# Patient Record
Sex: FEMALE | Race: WHITE | Employment: OTHER | URBAN - METROPOLITAN AREA
[De-identification: names, ages, dates, MRNs, and addresses within clinical notes are randomized per-mention and may not be internally consistent; named-entity substitution may affect disease eponyms.]

---

## 2017-01-09 ENCOUNTER — INITIAL CONSULT (OUTPATIENT)
Dept: PSYCHOLOGY | Age: 82
End: 2017-01-09

## 2017-01-09 DIAGNOSIS — F43.21 COMPLICATED BEREAVEMENT: ICD-10-CM

## 2017-01-09 DIAGNOSIS — F32.A DEPRESSIVE DISORDER: Primary | ICD-10-CM

## 2017-01-09 PROCEDURE — 90832 PSYTX W PT 30 MINUTES: CPT | Performed by: PSYCHOLOGIST

## 2017-01-09 ASSESSMENT — PATIENT HEALTH QUESTIONNAIRE - PHQ9
SUM OF ALL RESPONSES TO PHQ9 QUESTIONS 1 & 2: 2
1. LITTLE INTEREST OR PLEASURE IN DOING THINGS: 1
9. THOUGHTS THAT YOU WOULD BE BETTER OFF DEAD, OR OF HURTING YOURSELF: 0
2. FEELING DOWN, DEPRESSED OR HOPELESS: 1
SUM OF ALL RESPONSES TO PHQ QUESTIONS 1-9: 4
5. POOR APPETITE OR OVEREATING: 0
4. FEELING TIRED OR HAVING LITTLE ENERGY: 1
3. TROUBLE FALLING OR STAYING ASLEEP: 0
6. FEELING BAD ABOUT YOURSELF - OR THAT YOU ARE A FAILURE OR HAVE LET YOURSELF OR YOUR FAMILY DOWN: 1
10. IF YOU CHECKED OFF ANY PROBLEMS, HOW DIFFICULT HAVE THESE PROBLEMS MADE IT FOR YOU TO DO YOUR WORK, TAKE CARE OF THINGS AT HOME, OR GET ALONG WITH OTHER PEOPLE: 1
8. MOVING OR SPEAKING SO SLOWLY THAT OTHER PEOPLE COULD HAVE NOTICED. OR THE OPPOSITE, BEING SO FIGETY OR RESTLESS THAT YOU HAVE BEEN MOVING AROUND A LOT MORE THAN USUAL: 0
7. TROUBLE CONCENTRATING ON THINGS, SUCH AS READING THE NEWSPAPER OR WATCHING TELEVISION: 0

## 2017-02-01 DIAGNOSIS — F43.21 GRIEF AT LOSS OF CHILD: ICD-10-CM

## 2017-02-01 DIAGNOSIS — R45.4 ANGER: ICD-10-CM

## 2017-02-01 DIAGNOSIS — Z63.4 GRIEF AT LOSS OF CHILD: ICD-10-CM

## 2017-02-01 DIAGNOSIS — Z13.31 POSITIVE DEPRESSION SCREENING: ICD-10-CM

## 2017-02-01 SDOH — SOCIAL STABILITY - SOCIAL INSECURITY: DISSAPEARANCE AND DEATH OF FAMILY MEMBER: Z63.4

## 2017-02-02 RX ORDER — FLUOXETINE 10 MG/1
CAPSULE ORAL
Qty: 30 CAPSULE | Refills: 5 | Status: SHIPPED | OUTPATIENT
Start: 2017-02-02 | End: 2018-05-08 | Stop reason: SDUPTHER

## 2017-11-03 ENCOUNTER — NURSE ONLY (OUTPATIENT)
Dept: FAMILY MEDICINE CLINIC | Age: 82
End: 2017-11-03

## 2017-11-03 DIAGNOSIS — Z23 NEED FOR INFLUENZA VACCINATION: Primary | ICD-10-CM

## 2017-12-11 ENCOUNTER — OFFICE VISIT (OUTPATIENT)
Dept: FAMILY MEDICINE CLINIC | Age: 82
End: 2017-12-11

## 2017-12-11 VITALS
SYSTOLIC BLOOD PRESSURE: 136 MMHG | WEIGHT: 114 LBS | OXYGEN SATURATION: 98 % | DIASTOLIC BLOOD PRESSURE: 80 MMHG | HEART RATE: 75 BPM | RESPIRATION RATE: 15 BRPM | BODY MASS INDEX: 20.85 KG/M2

## 2017-12-11 DIAGNOSIS — I10 ESSENTIAL HYPERTENSION: ICD-10-CM

## 2017-12-11 DIAGNOSIS — S09.90XA INJURY OF HEAD, INITIAL ENCOUNTER: Primary | ICD-10-CM

## 2017-12-11 DIAGNOSIS — S06.0X1A CONCUSSION WITH LOSS OF CONSCIOUSNESS OF 30 MINUTES OR LESS, INITIAL ENCOUNTER: ICD-10-CM

## 2017-12-11 DIAGNOSIS — Z91.81 AT HIGH RISK FOR FALLS: ICD-10-CM

## 2017-12-11 DIAGNOSIS — S00.83XA CONTUSION OF FACE, INITIAL ENCOUNTER: ICD-10-CM

## 2017-12-11 PROCEDURE — G8427 DOCREV CUR MEDS BY ELIG CLIN: HCPCS | Performed by: FAMILY MEDICINE

## 2017-12-11 PROCEDURE — 1036F TOBACCO NON-USER: CPT | Performed by: FAMILY MEDICINE

## 2017-12-11 PROCEDURE — 99214 OFFICE O/P EST MOD 30 MIN: CPT | Performed by: FAMILY MEDICINE

## 2017-12-11 PROCEDURE — 4040F PNEUMOC VAC/ADMIN/RCVD: CPT | Performed by: FAMILY MEDICINE

## 2017-12-11 PROCEDURE — 1090F PRES/ABSN URINE INCON ASSESS: CPT | Performed by: FAMILY MEDICINE

## 2017-12-11 PROCEDURE — G8484 FLU IMMUNIZE NO ADMIN: HCPCS | Performed by: FAMILY MEDICINE

## 2017-12-11 PROCEDURE — G8420 CALC BMI NORM PARAMETERS: HCPCS | Performed by: FAMILY MEDICINE

## 2017-12-11 PROCEDURE — 1123F ACP DISCUSS/DSCN MKR DOCD: CPT | Performed by: FAMILY MEDICINE

## 2017-12-11 PROCEDURE — 3288F FALL RISK ASSESSMENT DOCD: CPT | Performed by: FAMILY MEDICINE

## 2017-12-11 PROCEDURE — G8400 PT W/DXA NO RESULTS DOC: HCPCS | Performed by: FAMILY MEDICINE

## 2017-12-11 ASSESSMENT — ENCOUNTER SYMPTOMS
NAUSEA: 0
BLOOD IN STOOL: 0
COUGH: 0
SINUS PRESSURE: 0
RHINORRHEA: 0
CONSTIPATION: 0
SHORTNESS OF BREATH: 0
VOMITING: 0
SORE THROAT: 0
DIARRHEA: 0
ABDOMINAL PAIN: 0
WHEEZING: 0
BACK PAIN: 0
CHEST TIGHTNESS: 0

## 2017-12-11 NOTE — PROGRESS NOTES
Thinks she tripped but no memory of event. No other falls. Sutures out in one week    On the basis of positive falls risk screening, assessment and plan is as follows: in-office gait and balance testing performed using The Timed Up and Go Test was negative for increased falls risk- no further intervention is currently indicated.

## 2017-12-12 NOTE — ASSESSMENT & PLAN NOTE
Going into club and tripped out front . Unsure how long she was on ground. Blood everywhere and ended up in Er with laceration hematoma R forehead.

## 2017-12-18 ENCOUNTER — OFFICE VISIT (OUTPATIENT)
Dept: FAMILY MEDICINE CLINIC | Age: 82
End: 2017-12-18

## 2017-12-18 VITALS
HEART RATE: 72 BPM | RESPIRATION RATE: 14 BRPM | WEIGHT: 117 LBS | SYSTOLIC BLOOD PRESSURE: 130 MMHG | OXYGEN SATURATION: 95 % | DIASTOLIC BLOOD PRESSURE: 78 MMHG | BODY MASS INDEX: 21.4 KG/M2

## 2017-12-18 DIAGNOSIS — S00.83XD CONTUSION OF FACE, SUBSEQUENT ENCOUNTER: Primary | ICD-10-CM

## 2017-12-18 DIAGNOSIS — S09.90XD INJURY OF HEAD, SUBSEQUENT ENCOUNTER: ICD-10-CM

## 2017-12-18 DIAGNOSIS — S06.0X1D CONCUSSION WITH LOSS OF CONSCIOUSNESS OF 30 MINUTES OR LESS, SUBSEQUENT ENCOUNTER: ICD-10-CM

## 2017-12-18 PROCEDURE — 1090F PRES/ABSN URINE INCON ASSESS: CPT | Performed by: FAMILY MEDICINE

## 2017-12-18 PROCEDURE — 1036F TOBACCO NON-USER: CPT | Performed by: FAMILY MEDICINE

## 2017-12-18 PROCEDURE — 4040F PNEUMOC VAC/ADMIN/RCVD: CPT | Performed by: FAMILY MEDICINE

## 2017-12-18 PROCEDURE — G8420 CALC BMI NORM PARAMETERS: HCPCS | Performed by: FAMILY MEDICINE

## 2017-12-18 PROCEDURE — G8400 PT W/DXA NO RESULTS DOC: HCPCS | Performed by: FAMILY MEDICINE

## 2017-12-18 PROCEDURE — G8484 FLU IMMUNIZE NO ADMIN: HCPCS | Performed by: FAMILY MEDICINE

## 2017-12-18 PROCEDURE — 1123F ACP DISCUSS/DSCN MKR DOCD: CPT | Performed by: FAMILY MEDICINE

## 2017-12-18 PROCEDURE — G8427 DOCREV CUR MEDS BY ELIG CLIN: HCPCS | Performed by: FAMILY MEDICINE

## 2017-12-18 PROCEDURE — 99213 OFFICE O/P EST LOW 20 MIN: CPT | Performed by: FAMILY MEDICINE

## 2017-12-18 NOTE — PROGRESS NOTES
Chief Complaint   Patient presents with    Suture / Staple Removal    Follow-up     pt is still very sore     SUBJECTIVE:  Patient seen for follow-up post fall. She fell striking her head on concrete device for cigarette bouts with brief loss of consciousness. This was unobserved source uncertain how long she lost consciousness. Her bruising is steadily improving although she still has a hematoma over her right eye. The abrasion suturing without hematoma is healing slowly. OBJECTIVE:  /78 (Site: Left Arm, Position: Sitting, Cuff Size: Small Adult)   Pulse 72   Resp 14   Wt 117 lb (53.1 kg)   SpO2 95%   BMI 21.40 kg/m²   alert and oriented ×3  Pupils equal reactive to light and accommodation, cranial nerves II-12 intact  Heart regular rhythm and rate without murmur  Lungs clear bilaterally  Abdomen benign  Facial ecchymosis improving with a hematoma just above her right brow ridge      3 sutures were seen although there may be more hiding in the eschar over this lesion. This was minimally removed as she started bleeding profusely with the initial attempt to do so. Sutures ×3 removed without problem. I'm unable to see any other sutures present    Assessment/Plan:  Hayley Anna was seen today for suture / staple removal and follow-up. Diagnoses and all orders for this visit:    Contusion of face, subsequent encounter    Injury of head, subsequent encounter  -     GA REMOVAL OF SUTURES    Concussion with loss of consciousness of 30 minutes or less, subsequent encounter      Patient doing well post head injury, at this time we think this was a slip she has had no further falls or loss of consciousness.   Patient was advised to continue wash her forehead with soap and water and if she finds that there are other sutures present we will be happy to remove them

## 2017-12-26 DIAGNOSIS — I10 ESSENTIAL HYPERTENSION: ICD-10-CM

## 2017-12-26 RX ORDER — METOPROLOL TARTRATE 100 MG/1
TABLET ORAL
Qty: 180 TABLET | Refills: 3 | Status: SHIPPED | OUTPATIENT
Start: 2017-12-26 | End: 2018-12-17

## 2018-01-08 ENCOUNTER — OFFICE VISIT (OUTPATIENT)
Dept: FAMILY MEDICINE CLINIC | Age: 83
End: 2018-01-08

## 2018-01-08 VITALS
OXYGEN SATURATION: 94 % | RESPIRATION RATE: 16 BRPM | WEIGHT: 116.4 LBS | BODY MASS INDEX: 21.29 KG/M2 | DIASTOLIC BLOOD PRESSURE: 80 MMHG | SYSTOLIC BLOOD PRESSURE: 130 MMHG | HEART RATE: 97 BPM

## 2018-01-08 DIAGNOSIS — S06.0X1S CONCUSSION WITH LOSS OF CONSCIOUSNESS OF 30 MINUTES OR LESS, SEQUELA (HCC): Primary | ICD-10-CM

## 2018-01-08 DIAGNOSIS — Z95.0 CARDIAC PACEMAKER: ICD-10-CM

## 2018-01-08 DIAGNOSIS — I10 ESSENTIAL HYPERTENSION: ICD-10-CM

## 2018-01-08 DIAGNOSIS — F33.41 RECURRENT MAJOR DEPRESSIVE DISORDER, IN PARTIAL REMISSION (HCC): ICD-10-CM

## 2018-01-08 DIAGNOSIS — I27.20 PULMONARY HYPERTENSION (HCC): ICD-10-CM

## 2018-01-08 DIAGNOSIS — S00.83XS CONTUSION OF FACE, SEQUELA: ICD-10-CM

## 2018-01-08 DIAGNOSIS — S09.90XS INJURY OF HEAD, SEQUELA: ICD-10-CM

## 2018-01-08 PROBLEM — S00.83XA CONTUSION OF FACE: Status: RESOLVED | Noted: 2017-12-11 | Resolved: 2018-01-08

## 2018-01-08 PROBLEM — S06.0X1A CONCUSSION WTH LOSS OF CONSCIOUSNESS OF 30 MINUTES OR LESS: Status: RESOLVED | Noted: 2017-12-11 | Resolved: 2018-01-08

## 2018-01-08 PROBLEM — S09.90XA HEAD INJURY: Status: RESOLVED | Noted: 2017-12-11 | Resolved: 2018-01-08

## 2018-01-08 PROCEDURE — 99214 OFFICE O/P EST MOD 30 MIN: CPT | Performed by: FAMILY MEDICINE

## 2018-01-08 PROCEDURE — 1123F ACP DISCUSS/DSCN MKR DOCD: CPT | Performed by: FAMILY MEDICINE

## 2018-01-08 PROCEDURE — 1090F PRES/ABSN URINE INCON ASSESS: CPT | Performed by: FAMILY MEDICINE

## 2018-01-08 PROCEDURE — 1036F TOBACCO NON-USER: CPT | Performed by: FAMILY MEDICINE

## 2018-01-08 PROCEDURE — G8484 FLU IMMUNIZE NO ADMIN: HCPCS | Performed by: FAMILY MEDICINE

## 2018-01-08 PROCEDURE — G8427 DOCREV CUR MEDS BY ELIG CLIN: HCPCS | Performed by: FAMILY MEDICINE

## 2018-01-08 PROCEDURE — G8400 PT W/DXA NO RESULTS DOC: HCPCS | Performed by: FAMILY MEDICINE

## 2018-01-08 PROCEDURE — G8420 CALC BMI NORM PARAMETERS: HCPCS | Performed by: FAMILY MEDICINE

## 2018-01-08 PROCEDURE — 4040F PNEUMOC VAC/ADMIN/RCVD: CPT | Performed by: FAMILY MEDICINE

## 2018-01-08 ASSESSMENT — ENCOUNTER SYMPTOMS
CONSTIPATION: 0
SHORTNESS OF BREATH: 0
ABDOMINAL PAIN: 0
SORE THROAT: 0
RHINORRHEA: 0
COUGH: 0
VOMITING: 0
BACK PAIN: 0
DIARRHEA: 0
CHEST TIGHTNESS: 0
SINUS PRESSURE: 0
NAUSEA: 0
BLOOD IN STOOL: 0
WHEEZING: 0

## 2018-05-08 ENCOUNTER — OFFICE VISIT (OUTPATIENT)
Dept: FAMILY MEDICINE CLINIC | Age: 83
End: 2018-05-08

## 2018-05-08 VITALS
RESPIRATION RATE: 16 BRPM | HEART RATE: 77 BPM | DIASTOLIC BLOOD PRESSURE: 90 MMHG | WEIGHT: 113.4 LBS | SYSTOLIC BLOOD PRESSURE: 160 MMHG | BODY MASS INDEX: 20.74 KG/M2

## 2018-05-08 DIAGNOSIS — R45.4 ANGER: ICD-10-CM

## 2018-05-08 DIAGNOSIS — F09 COGNITIVE DYSFUNCTION: Primary | ICD-10-CM

## 2018-05-08 DIAGNOSIS — Z23 NEED FOR PROPHYLACTIC VACCINATION AND INOCULATION AGAINST VARICELLA: ICD-10-CM

## 2018-05-08 DIAGNOSIS — F43.21 GRIEF AT LOSS OF CHILD: ICD-10-CM

## 2018-05-08 DIAGNOSIS — E78.2 MIXED HYPERLIPIDEMIA: ICD-10-CM

## 2018-05-08 DIAGNOSIS — I10 ESSENTIAL HYPERTENSION: ICD-10-CM

## 2018-05-08 DIAGNOSIS — Z95.0 CARDIAC PACEMAKER: ICD-10-CM

## 2018-05-08 DIAGNOSIS — Z78.0 POST-MENOPAUSAL: ICD-10-CM

## 2018-05-08 DIAGNOSIS — F33.41 RECURRENT MAJOR DEPRESSIVE DISORDER, IN PARTIAL REMISSION (HCC): ICD-10-CM

## 2018-05-08 DIAGNOSIS — R41.3 MEMORY LOSS: ICD-10-CM

## 2018-05-08 DIAGNOSIS — H93.13 TINNITUS OF BOTH EARS: ICD-10-CM

## 2018-05-08 DIAGNOSIS — Z13.31 POSITIVE DEPRESSION SCREENING: ICD-10-CM

## 2018-05-08 DIAGNOSIS — I27.20 PULMONARY HYPERTENSION (HCC): ICD-10-CM

## 2018-05-08 DIAGNOSIS — Z63.4 GRIEF AT LOSS OF CHILD: ICD-10-CM

## 2018-05-08 PROCEDURE — G0444 DEPRESSION SCREEN ANNUAL: HCPCS | Performed by: FAMILY MEDICINE

## 2018-05-08 PROCEDURE — 1036F TOBACCO NON-USER: CPT | Performed by: FAMILY MEDICINE

## 2018-05-08 PROCEDURE — G8400 PT W/DXA NO RESULTS DOC: HCPCS | Performed by: FAMILY MEDICINE

## 2018-05-08 PROCEDURE — G8427 DOCREV CUR MEDS BY ELIG CLIN: HCPCS | Performed by: FAMILY MEDICINE

## 2018-05-08 PROCEDURE — 1090F PRES/ABSN URINE INCON ASSESS: CPT | Performed by: FAMILY MEDICINE

## 2018-05-08 PROCEDURE — 1123F ACP DISCUSS/DSCN MKR DOCD: CPT | Performed by: FAMILY MEDICINE

## 2018-05-08 PROCEDURE — 99214 OFFICE O/P EST MOD 30 MIN: CPT | Performed by: FAMILY MEDICINE

## 2018-05-08 PROCEDURE — G8420 CALC BMI NORM PARAMETERS: HCPCS | Performed by: FAMILY MEDICINE

## 2018-05-08 PROCEDURE — 4040F PNEUMOC VAC/ADMIN/RCVD: CPT | Performed by: FAMILY MEDICINE

## 2018-05-08 RX ORDER — DONEPEZIL HYDROCHLORIDE 5 MG/1
5 TABLET, FILM COATED ORAL NIGHTLY
Qty: 30 TABLET | Refills: 3 | Status: SHIPPED | OUTPATIENT
Start: 2018-05-08 | End: 2018-09-14 | Stop reason: SDUPTHER

## 2018-05-08 RX ORDER — ATORVASTATIN CALCIUM 20 MG/1
20 TABLET, FILM COATED ORAL DAILY
Qty: 90 TABLET | Refills: 3 | Status: SHIPPED | OUTPATIENT
Start: 2018-05-08 | End: 2018-12-05 | Stop reason: ALTCHOICE

## 2018-05-08 RX ORDER — AMLODIPINE BESYLATE 5 MG/1
TABLET ORAL
Qty: 90 TABLET | Refills: 3 | Status: SHIPPED | OUTPATIENT
Start: 2018-05-08 | End: 2018-12-05 | Stop reason: ALTCHOICE

## 2018-05-08 RX ORDER — LOSARTAN POTASSIUM 100 MG/1
TABLET ORAL
Qty: 90 TABLET | Refills: 3 | Status: SHIPPED | OUTPATIENT
Start: 2018-05-08 | End: 2018-12-05 | Stop reason: ALTCHOICE

## 2018-05-08 RX ORDER — FLUOXETINE 10 MG/1
10 CAPSULE ORAL DAILY
Qty: 30 CAPSULE | Refills: 5 | Status: SHIPPED | OUTPATIENT
Start: 2018-05-08 | End: 2018-10-18 | Stop reason: SDUPTHER

## 2018-05-08 SDOH — SOCIAL STABILITY - SOCIAL INSECURITY: DISSAPEARANCE AND DEATH OF FAMILY MEMBER: Z63.4

## 2018-05-08 ASSESSMENT — PATIENT HEALTH QUESTIONNAIRE - PHQ9
6. FEELING BAD ABOUT YOURSELF - OR THAT YOU ARE A FAILURE OR HAVE LET YOURSELF OR YOUR FAMILY DOWN: 3
SUM OF ALL RESPONSES TO PHQ9 QUESTIONS 1 & 2: 2
9. THOUGHTS THAT YOU WOULD BE BETTER OFF DEAD, OR OF HURTING YOURSELF: 0
7. TROUBLE CONCENTRATING ON THINGS, SUCH AS READING THE NEWSPAPER OR WATCHING TELEVISION: 0
2. FEELING DOWN, DEPRESSED OR HOPELESS: 2
3. TROUBLE FALLING OR STAYING ASLEEP: 0
10. IF YOU CHECKED OFF ANY PROBLEMS, HOW DIFFICULT HAVE THESE PROBLEMS MADE IT FOR YOU TO DO YOUR WORK, TAKE CARE OF THINGS AT HOME, OR GET ALONG WITH OTHER PEOPLE: 1
SUM OF ALL RESPONSES TO PHQ QUESTIONS 1-9: 6
1. LITTLE INTEREST OR PLEASURE IN DOING THINGS: 0
8. MOVING OR SPEAKING SO SLOWLY THAT OTHER PEOPLE COULD HAVE NOTICED. OR THE OPPOSITE, BEING SO FIGETY OR RESTLESS THAT YOU HAVE BEEN MOVING AROUND A LOT MORE THAN USUAL: 0
5. POOR APPETITE OR OVEREATING: 0
4. FEELING TIRED OR HAVING LITTLE ENERGY: 1

## 2018-05-10 ASSESSMENT — ENCOUNTER SYMPTOMS
GASTROINTESTINAL NEGATIVE: 1
BACK PAIN: 0
EYES NEGATIVE: 1
RESPIRATORY NEGATIVE: 1

## 2018-06-05 ENCOUNTER — OFFICE VISIT (OUTPATIENT)
Dept: FAMILY MEDICINE CLINIC | Age: 83
End: 2018-06-05

## 2018-06-05 VITALS
HEIGHT: 62 IN | DIASTOLIC BLOOD PRESSURE: 74 MMHG | SYSTOLIC BLOOD PRESSURE: 120 MMHG | RESPIRATION RATE: 16 BRPM | BODY MASS INDEX: 20.54 KG/M2 | WEIGHT: 111.6 LBS | HEART RATE: 60 BPM

## 2018-06-05 DIAGNOSIS — Z00.00 ROUTINE GENERAL MEDICAL EXAMINATION AT A HEALTH CARE FACILITY: Primary | ICD-10-CM

## 2018-06-05 DIAGNOSIS — Z78.0 POST-MENOPAUSAL: ICD-10-CM

## 2018-06-05 DIAGNOSIS — Z23 NEED FOR PROPHYLACTIC VACCINATION AND INOCULATION AGAINST VARICELLA: ICD-10-CM

## 2018-06-05 PROCEDURE — G0439 PPPS, SUBSEQ VISIT: HCPCS | Performed by: FAMILY MEDICINE

## 2018-06-05 PROCEDURE — 4040F PNEUMOC VAC/ADMIN/RCVD: CPT | Performed by: FAMILY MEDICINE

## 2018-06-05 ASSESSMENT — LIFESTYLE VARIABLES
AUDIT-C TOTAL SCORE: 2
HOW OFTEN DURING THE LAST YEAR HAVE YOU NEEDED AN ALCOHOLIC DRINK FIRST THING IN THE MORNING TO GET YOURSELF GOING AFTER A NIGHT OF HEAVY DRINKING: 0
HOW OFTEN DO YOU HAVE SIX OR MORE DRINKS ON ONE OCCASION: 0
HAVE YOU OR SOMEONE ELSE BEEN INJURED AS A RESULT OF YOUR DRINKING: 0
HOW MANY STANDARD DRINKS CONTAINING ALCOHOL DO YOU HAVE ON A TYPICAL DAY: 0
HAS A RELATIVE, FRIEND, DOCTOR, OR ANOTHER HEALTH PROFESSIONAL EXPRESSED CONCERN ABOUT YOUR DRINKING OR SUGGESTED YOU CUT DOWN: 0
AUDIT TOTAL SCORE: 2
HOW OFTEN DURING THE LAST YEAR HAVE YOU FAILED TO DO WHAT WAS NORMALLY EXPECTED FROM YOU BECAUSE OF DRINKING: 0
HOW OFTEN DO YOU HAVE A DRINK CONTAINING ALCOHOL: 2
HOW OFTEN DURING THE LAST YEAR HAVE YOU BEEN UNABLE TO REMEMBER WHAT HAPPENED THE NIGHT BEFORE BECAUSE YOU HAD BEEN DRINKING: 0
HOW OFTEN DURING THE LAST YEAR HAVE YOU HAD A FEELING OF GUILT OR REMORSE AFTER DRINKING: 0
HOW OFTEN DURING THE LAST YEAR HAVE YOU FOUND THAT YOU WERE NOT ABLE TO STOP DRINKING ONCE YOU HAD STARTED: 0

## 2018-06-05 ASSESSMENT — PATIENT HEALTH QUESTIONNAIRE - PHQ9: SUM OF ALL RESPONSES TO PHQ QUESTIONS 1-9: 1

## 2018-06-05 ASSESSMENT — ANXIETY QUESTIONNAIRES: GAD7 TOTAL SCORE: 1

## 2018-06-21 ENCOUNTER — HOSPITAL ENCOUNTER (OUTPATIENT)
Dept: MAMMOGRAPHY | Age: 83
Discharge: HOME | End: 2018-06-21
Attending: FAMILY MEDICINE | Admitting: FAMILY MEDICINE

## 2018-06-21 DIAGNOSIS — Z78.0 POST-MENOPAUSAL: ICD-10-CM

## 2018-06-22 DIAGNOSIS — M81.0 OSTEOPOROSIS, UNSPECIFIED OSTEOPOROSIS TYPE, UNSPECIFIED PATHOLOGICAL FRACTURE PRESENCE: Primary | ICD-10-CM

## 2018-06-22 RX ORDER — ALENDRONATE SODIUM 70 MG/1
70 TABLET ORAL
Qty: 4 TABLET | Refills: 3 | Status: SHIPPED | OUTPATIENT
Start: 2018-06-22 | End: 2018-10-18 | Stop reason: SDUPTHER

## 2018-09-14 ENCOUNTER — OFFICE VISIT (OUTPATIENT)
Dept: FAMILY MEDICINE CLINIC | Age: 83
End: 2018-09-14

## 2018-09-14 VITALS
RESPIRATION RATE: 16 BRPM | BODY MASS INDEX: 19.98 KG/M2 | SYSTOLIC BLOOD PRESSURE: 132 MMHG | DIASTOLIC BLOOD PRESSURE: 68 MMHG | WEIGHT: 108.38 LBS | OXYGEN SATURATION: 99 % | HEART RATE: 66 BPM

## 2018-09-14 DIAGNOSIS — F09 COGNITIVE DYSFUNCTION: Primary | ICD-10-CM

## 2018-09-14 DIAGNOSIS — Z95.0 CARDIAC PACEMAKER: ICD-10-CM

## 2018-09-14 DIAGNOSIS — Z13.31 POSITIVE DEPRESSION SCREENING: ICD-10-CM

## 2018-09-14 DIAGNOSIS — E78.5 HYPERLIPIDEMIA WITH TARGET LDL LESS THAN 100: ICD-10-CM

## 2018-09-14 DIAGNOSIS — F33.41 RECURRENT MAJOR DEPRESSIVE DISORDER, IN PARTIAL REMISSION (HCC): ICD-10-CM

## 2018-09-14 DIAGNOSIS — I27.20 PULMONARY HYPERTENSION (HCC): ICD-10-CM

## 2018-09-14 DIAGNOSIS — I10 ESSENTIAL HYPERTENSION: ICD-10-CM

## 2018-09-14 PROCEDURE — G8431 POS CLIN DEPRES SCRN F/U DOC: HCPCS | Performed by: FAMILY MEDICINE

## 2018-09-14 PROCEDURE — 4040F PNEUMOC VAC/ADMIN/RCVD: CPT | Performed by: FAMILY MEDICINE

## 2018-09-14 PROCEDURE — G8399 PT W/DXA RESULTS DOCUMENT: HCPCS | Performed by: FAMILY MEDICINE

## 2018-09-14 PROCEDURE — 1123F ACP DISCUSS/DSCN MKR DOCD: CPT | Performed by: FAMILY MEDICINE

## 2018-09-14 PROCEDURE — 1036F TOBACCO NON-USER: CPT | Performed by: FAMILY MEDICINE

## 2018-09-14 PROCEDURE — G8420 CALC BMI NORM PARAMETERS: HCPCS | Performed by: FAMILY MEDICINE

## 2018-09-14 PROCEDURE — 1090F PRES/ABSN URINE INCON ASSESS: CPT | Performed by: FAMILY MEDICINE

## 2018-09-14 PROCEDURE — G8427 DOCREV CUR MEDS BY ELIG CLIN: HCPCS | Performed by: FAMILY MEDICINE

## 2018-09-14 PROCEDURE — 99214 OFFICE O/P EST MOD 30 MIN: CPT | Performed by: FAMILY MEDICINE

## 2018-09-14 PROCEDURE — 1101F PT FALLS ASSESS-DOCD LE1/YR: CPT | Performed by: FAMILY MEDICINE

## 2018-09-14 RX ORDER — DONEPEZIL HYDROCHLORIDE 10 MG/1
10 TABLET, FILM COATED ORAL NIGHTLY
Qty: 30 TABLET | Refills: 3 | Status: SHIPPED | OUTPATIENT
Start: 2018-09-14 | End: 2018-12-17

## 2018-09-23 ASSESSMENT — ENCOUNTER SYMPTOMS
SHORTNESS OF BREATH: 0
SORE THROAT: 0
CHEST TIGHTNESS: 0
NAUSEA: 0
RHINORRHEA: 0
WHEEZING: 0
DIARRHEA: 0
VOMITING: 0
COUGH: 0
SINUS PRESSURE: 0
CONSTIPATION: 0
BACK PAIN: 0
ABDOMINAL PAIN: 0
BLOOD IN STOOL: 0

## 2018-10-18 ENCOUNTER — OFFICE VISIT (OUTPATIENT)
Dept: FAMILY MEDICINE CLINIC | Age: 83
End: 2018-10-18
Payer: MEDICARE

## 2018-10-18 DIAGNOSIS — C44.90 SKIN CANCER: ICD-10-CM

## 2018-10-18 DIAGNOSIS — M81.0 OSTEOPOROSIS, UNSPECIFIED OSTEOPOROSIS TYPE, UNSPECIFIED PATHOLOGICAL FRACTURE PRESENCE: ICD-10-CM

## 2018-10-18 DIAGNOSIS — R45.4 ANGER: ICD-10-CM

## 2018-10-18 DIAGNOSIS — F09 COGNITIVE DYSFUNCTION: Primary | ICD-10-CM

## 2018-10-18 DIAGNOSIS — G30.1 LATE ONSET ALZHEIMER'S DISEASE WITHOUT BEHAVIORAL DISTURBANCE (HCC): ICD-10-CM

## 2018-10-18 DIAGNOSIS — F43.21 GRIEF AT LOSS OF CHILD: ICD-10-CM

## 2018-10-18 DIAGNOSIS — F02.80 LATE ONSET ALZHEIMER'S DISEASE WITHOUT BEHAVIORAL DISTURBANCE (HCC): ICD-10-CM

## 2018-10-18 DIAGNOSIS — Z63.4 GRIEF AT LOSS OF CHILD: ICD-10-CM

## 2018-10-18 DIAGNOSIS — Z23 NEED FOR INFLUENZA VACCINATION: ICD-10-CM

## 2018-10-18 DIAGNOSIS — Z13.31 POSITIVE DEPRESSION SCREENING: ICD-10-CM

## 2018-10-18 PROCEDURE — 99214 OFFICE O/P EST MOD 30 MIN: CPT | Performed by: FAMILY MEDICINE

## 2018-10-18 PROCEDURE — G0008 ADMIN INFLUENZA VIRUS VAC: HCPCS | Performed by: FAMILY MEDICINE

## 2018-10-18 PROCEDURE — 90686 IIV4 VACC NO PRSV 0.5 ML IM: CPT | Performed by: FAMILY MEDICINE

## 2018-10-18 PROCEDURE — 1090F PRES/ABSN URINE INCON ASSESS: CPT | Performed by: FAMILY MEDICINE

## 2018-10-18 PROCEDURE — 1101F PT FALLS ASSESS-DOCD LE1/YR: CPT | Performed by: FAMILY MEDICINE

## 2018-10-18 PROCEDURE — G8427 DOCREV CUR MEDS BY ELIG CLIN: HCPCS | Performed by: FAMILY MEDICINE

## 2018-10-18 PROCEDURE — G8399 PT W/DXA RESULTS DOCUMENT: HCPCS | Performed by: FAMILY MEDICINE

## 2018-10-18 PROCEDURE — 4040F PNEUMOC VAC/ADMIN/RCVD: CPT | Performed by: FAMILY MEDICINE

## 2018-10-18 PROCEDURE — G8482 FLU IMMUNIZE ORDER/ADMIN: HCPCS | Performed by: FAMILY MEDICINE

## 2018-10-18 PROCEDURE — 1123F ACP DISCUSS/DSCN MKR DOCD: CPT | Performed by: FAMILY MEDICINE

## 2018-10-18 PROCEDURE — 1036F TOBACCO NON-USER: CPT | Performed by: FAMILY MEDICINE

## 2018-10-18 PROCEDURE — G8420 CALC BMI NORM PARAMETERS: HCPCS | Performed by: FAMILY MEDICINE

## 2018-10-18 RX ORDER — MEMANTINE HYDROCHLORIDE 5 MG/1
5 TABLET ORAL 2 TIMES DAILY
Qty: 60 TABLET | Refills: 3 | Status: SHIPPED | OUTPATIENT
Start: 2018-10-18 | End: 2018-10-22 | Stop reason: SDUPTHER

## 2018-10-18 RX ORDER — ALENDRONATE SODIUM 70 MG/1
70 TABLET ORAL
Qty: 4 TABLET | Refills: 3 | Status: SHIPPED | OUTPATIENT
Start: 2018-10-18 | End: 2018-12-05 | Stop reason: ALTCHOICE

## 2018-10-18 RX ORDER — FLUOXETINE 10 MG/1
10 CAPSULE ORAL DAILY
Qty: 30 CAPSULE | Refills: 5 | Status: SHIPPED | OUTPATIENT
Start: 2018-10-18 | End: 2018-12-17

## 2018-10-18 RX ORDER — MEMANTINE HYDROCHLORIDE 5 MG-10 MG
KIT ORAL
Qty: 1 PACKAGE | Refills: 0 | Status: SHIPPED | OUTPATIENT
Start: 2018-10-18 | End: 2018-11-15 | Stop reason: SINTOL

## 2018-10-18 SDOH — SOCIAL STABILITY - SOCIAL INSECURITY: DISSAPEARANCE AND DEATH OF FAMILY MEMBER: Z63.4

## 2018-10-18 ASSESSMENT — ENCOUNTER SYMPTOMS
VOMITING: 0
DIARRHEA: 0
WHEEZING: 0
NAUSEA: 0
ABDOMINAL PAIN: 0
BACK PAIN: 0
COUGH: 0
SINUS PRESSURE: 0
CHEST TIGHTNESS: 0
BLOOD IN STOOL: 0
RHINORRHEA: 0
SORE THROAT: 0
SHORTNESS OF BREATH: 0
CONSTIPATION: 0

## 2018-10-18 ASSESSMENT — PATIENT HEALTH QUESTIONNAIRE - PHQ9
SUM OF ALL RESPONSES TO PHQ QUESTIONS 1-9: 0
SUM OF ALL RESPONSES TO PHQ9 QUESTIONS 1 & 2: 0
SUM OF ALL RESPONSES TO PHQ QUESTIONS 1-9: 0
1. LITTLE INTEREST OR PLEASURE IN DOING THINGS: 0
2. FEELING DOWN, DEPRESSED OR HOPELESS: 0

## 2018-10-18 NOTE — PROGRESS NOTES
External ear normal.   Left Ear: External ear normal.   Nose: Nose normal.   Mouth/Throat: Oropharynx is clear and moist.   Eyes: Pupils are equal, round, and reactive to light. Conjunctivae and EOM are normal.   Neck: Normal range of motion. Neck supple. No JVD present. No tracheal deviation present. No thyromegaly present. Cardiovascular: Normal rate, regular rhythm, normal heart sounds and intact distal pulses. Pulmonary/Chest: Effort normal and breath sounds normal. She has no wheezes. She has no rales. Abdominal: Soft. Bowel sounds are normal. She exhibits no mass. Musculoskeletal: Normal range of motion. She exhibits no edema. Lymphadenopathy:     She has no cervical adenopathy. Neurological: She is alert and oriented to person, place, and time. She has normal reflexes. Forgetful noted during this visit   Skin: Skin is warm and dry. No rash noted. Sore L nose, L cheek and chin   Psychiatric: She has a normal mood and affect. Her behavior is normal. Judgment and thought content normal.       1. Cognitive dysfunction  Worsening function despite husbands support, add namenda  - memantine (NAMENDA) 5 MG tablet; Take 1 tablet by mouth 2 times daily  Dispense: 60 tablet; Refill: 3  - memantine (NAMENDA TITRATION CARA) 5 (28)-10 (21) MG tablet pack; 5 mg/day for =1 week; 5 mg twice daily for =1 week; 15 mg/day given in 5 mg and 10 mg  doses for =1 week; then 10 mg twice daily  Dispense: 1 Package; Refill: 0    2. Skin cancer    - External Referral To Dermatology    3. Osteoporosis, unspecified osteoporosis type, unspecified pathological fracture presence    - alendronate (FOSAMAX) 70 MG tablet; Take 1 tablet by mouth every 7 days  Dispense: 4 tablet; Refill: 3    4. Grief at loss of child  better  - FLUoxetine (PROZAC) 10 MG capsule; Take 1 capsule by mouth daily  Dispense: 30 capsule; Refill: 5    5. Positive depression screening    - FLUoxetine (PROZAC) 10 MG capsule;  Take 1 capsule by

## 2018-10-22 ENCOUNTER — TELEPHONE (OUTPATIENT)
Dept: FAMILY MEDICINE CLINIC | Age: 83
End: 2018-10-22

## 2018-10-22 DIAGNOSIS — F09 COGNITIVE DYSFUNCTION: ICD-10-CM

## 2018-10-22 RX ORDER — MEMANTINE HYDROCHLORIDE 10 MG/1
10 TABLET ORAL 2 TIMES DAILY
Qty: 60 TABLET | Refills: 5 | Status: SHIPPED | OUTPATIENT
Start: 2018-10-22 | End: 2018-11-15 | Stop reason: SDUPTHER

## 2018-11-01 ENCOUNTER — TELEPHONE (OUTPATIENT)
Dept: FAMILY MEDICINE CLINIC | Age: 83
End: 2018-11-01

## 2018-11-02 DIAGNOSIS — C44.90 SKIN CANCER: Primary | ICD-10-CM

## 2018-11-15 ENCOUNTER — OFFICE VISIT (OUTPATIENT)
Dept: FAMILY MEDICINE CLINIC | Age: 83
End: 2018-11-15
Payer: MEDICARE

## 2018-11-15 VITALS
DIASTOLIC BLOOD PRESSURE: 74 MMHG | HEART RATE: 88 BPM | BODY MASS INDEX: 19.95 KG/M2 | HEIGHT: 62 IN | WEIGHT: 108.4 LBS | RESPIRATION RATE: 22 BRPM | SYSTOLIC BLOOD PRESSURE: 138 MMHG

## 2018-11-15 DIAGNOSIS — I10 ESSENTIAL HYPERTENSION: ICD-10-CM

## 2018-11-15 DIAGNOSIS — F09 COGNITIVE DYSFUNCTION: ICD-10-CM

## 2018-11-15 DIAGNOSIS — F41.9 ANXIETY: Primary | ICD-10-CM

## 2018-11-15 DIAGNOSIS — G30.1 LATE ONSET ALZHEIMER'S DISEASE WITHOUT BEHAVIORAL DISTURBANCE (HCC): ICD-10-CM

## 2018-11-15 DIAGNOSIS — M81.0 AGE-RELATED OSTEOPOROSIS WITHOUT CURRENT PATHOLOGICAL FRACTURE: ICD-10-CM

## 2018-11-15 DIAGNOSIS — F02.80 LATE ONSET ALZHEIMER'S DISEASE WITHOUT BEHAVIORAL DISTURBANCE (HCC): ICD-10-CM

## 2018-11-15 PROCEDURE — 1101F PT FALLS ASSESS-DOCD LE1/YR: CPT | Performed by: FAMILY MEDICINE

## 2018-11-15 PROCEDURE — G8399 PT W/DXA RESULTS DOCUMENT: HCPCS | Performed by: FAMILY MEDICINE

## 2018-11-15 PROCEDURE — 99214 OFFICE O/P EST MOD 30 MIN: CPT | Performed by: FAMILY MEDICINE

## 2018-11-15 PROCEDURE — 1123F ACP DISCUSS/DSCN MKR DOCD: CPT | Performed by: FAMILY MEDICINE

## 2018-11-15 PROCEDURE — G8482 FLU IMMUNIZE ORDER/ADMIN: HCPCS | Performed by: FAMILY MEDICINE

## 2018-11-15 PROCEDURE — 1090F PRES/ABSN URINE INCON ASSESS: CPT | Performed by: FAMILY MEDICINE

## 2018-11-15 PROCEDURE — 4040F PNEUMOC VAC/ADMIN/RCVD: CPT | Performed by: FAMILY MEDICINE

## 2018-11-15 PROCEDURE — G8427 DOCREV CUR MEDS BY ELIG CLIN: HCPCS | Performed by: FAMILY MEDICINE

## 2018-11-15 PROCEDURE — G8420 CALC BMI NORM PARAMETERS: HCPCS | Performed by: FAMILY MEDICINE

## 2018-11-15 PROCEDURE — 1036F TOBACCO NON-USER: CPT | Performed by: FAMILY MEDICINE

## 2018-11-15 RX ORDER — BUSPIRONE HYDROCHLORIDE 10 MG/1
10 TABLET ORAL 2 TIMES DAILY
Qty: 60 TABLET | Refills: 5 | Status: SHIPPED | OUTPATIENT
Start: 2018-11-15 | End: 2018-12-05 | Stop reason: ALTCHOICE

## 2018-11-15 RX ORDER — MEMANTINE HYDROCHLORIDE 10 MG/1
5 TABLET ORAL DAILY
Qty: 60 TABLET | Refills: 5 | Status: SHIPPED
Start: 2018-11-15 | End: 2018-11-15 | Stop reason: DRUGHIGH

## 2018-11-15 RX ORDER — MEMANTINE HYDROCHLORIDE 5 MG/1
5 TABLET ORAL DAILY
Qty: 90 TABLET | Refills: 5 | Status: SHIPPED | OUTPATIENT
Start: 2018-11-15 | End: 2018-12-17

## 2018-11-15 ASSESSMENT — ENCOUNTER SYMPTOMS
SHORTNESS OF BREATH: 0
SORE THROAT: 0
VOMITING: 0
SINUS PRESSURE: 0
RHINORRHEA: 0
BACK PAIN: 0
CHEST TIGHTNESS: 0
CONSTIPATION: 0
BLOOD IN STOOL: 0
NAUSEA: 0
WHEEZING: 0
COUGH: 0
ABDOMINAL PAIN: 0
DIARRHEA: 0

## 2018-11-15 ASSESSMENT — PATIENT HEALTH QUESTIONNAIRE - PHQ9
1. LITTLE INTEREST OR PLEASURE IN DOING THINGS: 0
SUM OF ALL RESPONSES TO PHQ QUESTIONS 1-9: 1
SUM OF ALL RESPONSES TO PHQ9 QUESTIONS 1 & 2: 1
SUM OF ALL RESPONSES TO PHQ QUESTIONS 1-9: 1
2. FEELING DOWN, DEPRESSED OR HOPELESS: 1

## 2018-11-15 NOTE — PROGRESS NOTES
11/15/2018     Teresa Mccormick (:  1935) is a 80 y.o. female, here for evaluation of the following medical concerns:    1. Nerves worse. 2. New pills made her sick so she stopped them. 3.  Discussed Namenda and benefits for both her memory and anxiety. He also discussed her Fosamax and her osteoporosis on DEXA          Review of Systems   Constitutional: Negative for activity change, fatigue and fever. HENT: Negative for congestion, ear pain, rhinorrhea, sinus pressure and sore throat. Eyes: Negative for visual disturbance. Respiratory: Negative for cough, chest tightness, shortness of breath and wheezing. Cardiovascular: Negative for chest pain, palpitations and leg swelling. Gastrointestinal: Negative for abdominal pain, blood in stool, constipation, diarrhea, nausea and vomiting. Genitourinary: Negative for dysuria, flank pain, hematuria and urgency. Musculoskeletal: Negative for arthralgias, back pain and joint swelling. Skin: Negative for rash. Neurological: Negative for weakness, numbness and headaches. Psychiatric/Behavioral: Negative. Prior to Visit Medications    Medication Sig Taking?  Authorizing Provider   memantine (NAMENDA) 10 MG tablet Take 1 tablet by mouth 2 times daily Start 10 mg twice a day after done with starter pack Yes Caleb Uribe MD   alendronate (FOSAMAX) 70 MG tablet Take 1 tablet by mouth every 7 days Yes Caleb Uribe MD   FLUoxetine (PROZAC) 10 MG capsule Take 1 capsule by mouth daily Yes Caleb Uribe MD   memantine [facility] TITRATION CARA) 5 (28)-10 (21) MG tablet pack 5 mg/day for =1 week; 5 mg twice daily for =1 week; 15 mg/day given in 5 mg and 10 mg  doses for =1 week; then 10 mg twice daily Yes Caleb Uribe MD   donepezil (ARICEPT) 10 MG tablet Take 1 tablet by mouth nightly Yes Caleb Uribe MD   amLODIPine (NORVASC) 5 MG tablet TAKE 1 TABLET DAILY Yes Caleb Uribe MD   losartan (COZAAR) 100 MG regular rhythm, normal heart sounds and intact distal pulses. Pulmonary/Chest: Effort normal and breath sounds normal. She has no wheezes. She has no rales. Abdominal: Soft. Bowel sounds are normal. She exhibits no mass. Musculoskeletal: Normal range of motion. She exhibits no edema. Lymphadenopathy:     She has no cervical adenopathy. Neurological: She is alert and oriented to person, place, and time. She has normal reflexes. Forgetful noted during this visit   Skin: Skin is warm and dry. No rash noted. Psychiatric: She has a normal mood and affect. Her behavior is normal. Judgment and thought content normal.       ASSESSMENT/PLAN:  1. Anxiety  Patient complains of anxiety. We will restart her on Namenda 5 daily in an effort to help her with her memory problems and start BuSpar as a mild anxiolytic  - busPIRone (BUSPAR) 10 MG tablet; Take 1 tablet by mouth 2 times daily  Dispense: 60 tablet; Refill: 5    2. 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 Cognitive dysfunction    - memantine (NAMENDA) 5 MG tablet; Take 1 tablet by mouth daily  Dispense: 90 tablet; Refill: 5    4. Essential hypertension  Blood pressure adequately controlled will follow    5. Age-related osteoporosis without current pathological fracture  Discussed patient's DEXA findings with the patient and her . She's been started on Fosamax but she didn't start taking it and she's been advised to start taking the reduce her risk for fracture      Return in about 4 weeks (around 12/13/2018), or if symptoms worsen or fail to improve. An electronic signature was used to authenticate this note.     --Ron Chadwick MD on 11/15/2018 at 12:07 PM

## 2018-11-16 DIAGNOSIS — F09 COGNITIVE DYSFUNCTION: ICD-10-CM

## 2018-11-16 RX ORDER — MEMANTINE HYDROCHLORIDE 5 MG-10 MG
KIT ORAL
Qty: 49 TABLET | OUTPATIENT
Start: 2018-11-16

## 2018-12-04 ENCOUNTER — APPOINTMENT (OUTPATIENT)
Dept: CT IMAGING | Age: 83
End: 2018-12-04
Payer: MEDICARE

## 2018-12-04 ENCOUNTER — APPOINTMENT (OUTPATIENT)
Dept: GENERAL RADIOLOGY | Age: 83
End: 2018-12-04
Payer: MEDICARE

## 2018-12-04 ENCOUNTER — HOSPITAL ENCOUNTER (EMERGENCY)
Age: 83
Discharge: HOME | End: 2018-12-04
Attending: EMERGENCY MEDICINE
Payer: MEDICARE

## 2018-12-04 VITALS
TEMPERATURE: 98.1 F | RESPIRATION RATE: 17 BRPM | SYSTOLIC BLOOD PRESSURE: 189 MMHG | WEIGHT: 108 LBS | HEART RATE: 60 BPM | OXYGEN SATURATION: 95 % | BODY MASS INDEX: 19.88 KG/M2 | HEIGHT: 62 IN | DIASTOLIC BLOOD PRESSURE: 84 MMHG

## 2018-12-04 DIAGNOSIS — Z13.9 ENCOUNTER FOR MEDICAL SCREENING EXAMINATION: ICD-10-CM

## 2018-12-04 DIAGNOSIS — D32.9 MENINGIOMA (HCC): Primary | ICD-10-CM

## 2018-12-04 DIAGNOSIS — F03.90 DEMENTIA WITHOUT BEHAVIORAL DISTURBANCE, UNSPECIFIED DEMENTIA TYPE: ICD-10-CM

## 2018-12-04 LAB
ALBUMIN SERPL-MCNC: 3.1 GM/DL (ref 3.4–5)
ALP BLD-CCNC: 103 IU/L (ref 40–129)
ALT SERPL-CCNC: 24 U/L (ref 10–40)
ANION GAP SERPL CALCULATED.3IONS-SCNC: 7 MMOL/L (ref 4–16)
APTT: 29.9 SECONDS (ref 24–40)
AST SERPL-CCNC: 32 IU/L (ref 15–37)
BACTERIA: ABNORMAL /HPF
BILIRUB SERPL-MCNC: 0.4 MG/DL (ref 0–1)
BILIRUBIN URINE: NEGATIVE MG/DL
BLOOD, URINE: NEGATIVE
BUN BLDV-MCNC: 10 MG/DL (ref 6–23)
CALCIUM SERPL-MCNC: 8.7 MG/DL (ref 8.3–10.6)
CAST TYPE: ABNORMAL /HPF
CHLORIDE BLD-SCNC: 108 MMOL/L (ref 99–110)
CLARITY: CLEAR
CO2: 29 MMOL/L (ref 21–32)
COLOR: YELLOW
CREAT SERPL-MCNC: 0.5 MG/DL (ref 0.6–1.1)
CRYSTAL TYPE: ABNORMAL /HPF
EPITHELIAL CELLS, UA: ABNORMAL /HPF
GFR AFRICAN AMERICAN: >60 ML/MIN/1.73M2
GFR NON-AFRICAN AMERICAN: >60 ML/MIN/1.73M2
GLUCOSE BLD-MCNC: 104 MG/DL (ref 70–99)
GLUCOSE, URINE: NEGATIVE MG/DL
HCT VFR BLD CALC: 38.4 % (ref 37–47)
HEMOGLOBIN: 12.4 GM/DL (ref 12.5–16)
INR BLD: 1.3 INDEX
KETONES, URINE: NEGATIVE MG/DL
LEUKOCYTE ESTERASE, URINE: NEGATIVE
MAGNESIUM: 1.9 MG/DL (ref 1.8–2.4)
MCH RBC QN AUTO: 31.5 PG (ref 27–31)
MCHC RBC AUTO-ENTMCNC: 32.3 % (ref 32–36)
MCV RBC AUTO: 97.5 FL (ref 78–100)
MUCUS: NEGATIVE HPF
NITRITE URINE, QUANTITATIVE: NEGATIVE
PDW BLD-RTO: 14.5 % (ref 11.7–14.9)
PH, URINE: 7 (ref 5–8)
PLATELET # BLD: 143 K/CU MM (ref 140–440)
PMV BLD AUTO: 12.6 FL (ref 7.5–11.1)
POTASSIUM SERPL-SCNC: 3.4 MMOL/L (ref 3.5–5.1)
PROTEIN UA: NEGATIVE MG/DL
PROTHROMBIN TIME: 14.8 SECONDS (ref 9.12–12.5)
RBC # BLD: 3.94 M/CU MM (ref 4.2–5.4)
RBC URINE: ABNORMAL /HPF (ref 0–6)
SODIUM BLD-SCNC: 144 MMOL/L (ref 135–145)
SPECIFIC GRAVITY UA: 1.01 (ref 1–1.03)
TOTAL PROTEIN: 6.5 GM/DL (ref 6.4–8.2)
TROPONIN T: <0.01 NG/ML
UROBILINOGEN, URINE: 0.2 MG/DL (ref 0.2–1)
VOLUME, (UVOL): 12 ML (ref 10–12)
WBC # BLD: 7.4 K/CU MM (ref 4–10.5)
WBC UA: ABNORMAL /HPF (ref 0–5)

## 2018-12-04 PROCEDURE — 84484 ASSAY OF TROPONIN QUANT: CPT

## 2018-12-04 PROCEDURE — 85730 THROMBOPLASTIN TIME PARTIAL: CPT

## 2018-12-04 PROCEDURE — 80053 COMPREHEN METABOLIC PANEL: CPT

## 2018-12-04 PROCEDURE — 71046 X-RAY EXAM CHEST 2 VIEWS: CPT

## 2018-12-04 PROCEDURE — 99285 EMERGENCY DEPT VISIT HI MDM: CPT

## 2018-12-04 PROCEDURE — 93010 ELECTROCARDIOGRAM REPORT: CPT | Performed by: INTERNAL MEDICINE

## 2018-12-04 PROCEDURE — 83735 ASSAY OF MAGNESIUM: CPT

## 2018-12-04 PROCEDURE — 85027 COMPLETE CBC AUTOMATED: CPT

## 2018-12-04 PROCEDURE — 81001 URINALYSIS AUTO W/SCOPE: CPT

## 2018-12-04 PROCEDURE — 93005 ELECTROCARDIOGRAM TRACING: CPT | Performed by: EMERGENCY MEDICINE

## 2018-12-04 PROCEDURE — 85610 PROTHROMBIN TIME: CPT

## 2018-12-04 PROCEDURE — 70450 CT HEAD/BRAIN W/O DYE: CPT

## 2018-12-05 ENCOUNTER — OFFICE VISIT (OUTPATIENT)
Dept: FAMILY MEDICINE CLINIC | Age: 83
End: 2018-12-05
Payer: MEDICARE

## 2018-12-05 VITALS
RESPIRATION RATE: 16 BRPM | SYSTOLIC BLOOD PRESSURE: 126 MMHG | BODY MASS INDEX: 19.64 KG/M2 | OXYGEN SATURATION: 96 % | WEIGHT: 107.38 LBS | HEART RATE: 60 BPM | DIASTOLIC BLOOD PRESSURE: 78 MMHG

## 2018-12-05 DIAGNOSIS — D32.9 MENINGIOMA (HCC): ICD-10-CM

## 2018-12-05 DIAGNOSIS — G30.1 LATE ONSET ALZHEIMER'S DISEASE WITH BEHAVIORAL DISTURBANCE (HCC): Primary | ICD-10-CM

## 2018-12-05 DIAGNOSIS — Z79.899 ENCOUNTER FOR MEDICATION REVIEW: ICD-10-CM

## 2018-12-05 DIAGNOSIS — F02.818 LATE ONSET ALZHEIMER'S DISEASE WITH BEHAVIORAL DISTURBANCE (HCC): Primary | ICD-10-CM

## 2018-12-05 DIAGNOSIS — R93.0 ABNORMAL CT OF THE HEAD: ICD-10-CM

## 2018-12-05 DIAGNOSIS — F03.92 HALLUCINATIONS DUE TO LATE ONSET DEMENTIA: ICD-10-CM

## 2018-12-05 PROCEDURE — 99214 OFFICE O/P EST MOD 30 MIN: CPT | Performed by: FAMILY MEDICINE

## 2018-12-05 PROCEDURE — G8427 DOCREV CUR MEDS BY ELIG CLIN: HCPCS | Performed by: FAMILY MEDICINE

## 2018-12-05 PROCEDURE — G8420 CALC BMI NORM PARAMETERS: HCPCS | Performed by: FAMILY MEDICINE

## 2018-12-05 PROCEDURE — 1101F PT FALLS ASSESS-DOCD LE1/YR: CPT | Performed by: FAMILY MEDICINE

## 2018-12-05 PROCEDURE — 1090F PRES/ABSN URINE INCON ASSESS: CPT | Performed by: FAMILY MEDICINE

## 2018-12-05 PROCEDURE — 4040F PNEUMOC VAC/ADMIN/RCVD: CPT | Performed by: FAMILY MEDICINE

## 2018-12-05 PROCEDURE — G8399 PT W/DXA RESULTS DOCUMENT: HCPCS | Performed by: FAMILY MEDICINE

## 2018-12-05 PROCEDURE — G8482 FLU IMMUNIZE ORDER/ADMIN: HCPCS | Performed by: FAMILY MEDICINE

## 2018-12-05 PROCEDURE — 1036F TOBACCO NON-USER: CPT | Performed by: FAMILY MEDICINE

## 2018-12-05 PROCEDURE — 1123F ACP DISCUSS/DSCN MKR DOCD: CPT | Performed by: FAMILY MEDICINE

## 2018-12-05 RX ORDER — QUETIAPINE FUMARATE 25 MG/1
25 TABLET, FILM COATED ORAL 2 TIMES DAILY
Qty: 60 TABLET | Refills: 3 | Status: SHIPPED | OUTPATIENT
Start: 2018-12-05 | End: 2018-12-17

## 2018-12-05 ASSESSMENT — PATIENT HEALTH QUESTIONNAIRE - PHQ9
2. FEELING DOWN, DEPRESSED OR HOPELESS: 1
1. LITTLE INTEREST OR PLEASURE IN DOING THINGS: 0
SUM OF ALL RESPONSES TO PHQ QUESTIONS 1-9: 1
SUM OF ALL RESPONSES TO PHQ9 QUESTIONS 1 & 2: 1
SUM OF ALL RESPONSES TO PHQ QUESTIONS 1-9: 1

## 2018-12-06 LAB
EKG ATRIAL RATE: 60 BPM
EKG DIAGNOSIS: NORMAL
EKG P-R INTERVAL: 218 MS
EKG Q-T INTERVAL: 450 MS
EKG QRS DURATION: 102 MS
EKG QTC CALCULATION (BAZETT): 450 MS
EKG R AXIS: -43 DEGREES
EKG T AXIS: -6 DEGREES
EKG VENTRICULAR RATE: 60 BPM

## 2018-12-16 ASSESSMENT — ENCOUNTER SYMPTOMS
CONSTIPATION: 0
DIARRHEA: 0
ABDOMINAL PAIN: 0
SORE THROAT: 0
BACK PAIN: 0
NAUSEA: 0
CHEST TIGHTNESS: 0
BLOOD IN STOOL: 0
SINUS PRESSURE: 0
SHORTNESS OF BREATH: 0
COUGH: 0
VOMITING: 0
RHINORRHEA: 0
WHEEZING: 0

## 2018-12-17 ENCOUNTER — OFFICE VISIT (OUTPATIENT)
Dept: FAMILY MEDICINE CLINIC | Age: 83
End: 2018-12-17
Payer: MEDICARE

## 2018-12-17 VITALS
HEART RATE: 60 BPM | SYSTOLIC BLOOD PRESSURE: 124 MMHG | HEIGHT: 62 IN | WEIGHT: 106.8 LBS | DIASTOLIC BLOOD PRESSURE: 68 MMHG | BODY MASS INDEX: 19.65 KG/M2 | RESPIRATION RATE: 14 BRPM

## 2018-12-17 DIAGNOSIS — D32.9 MENINGIOMA (HCC): ICD-10-CM

## 2018-12-17 DIAGNOSIS — F33.41 RECURRENT MAJOR DEPRESSIVE DISORDER, IN PARTIAL REMISSION (HCC): ICD-10-CM

## 2018-12-17 DIAGNOSIS — F03.92 HALLUCINATIONS DUE TO LATE ONSET DEMENTIA: ICD-10-CM

## 2018-12-17 DIAGNOSIS — M81.0 AGE-RELATED OSTEOPOROSIS WITHOUT CURRENT PATHOLOGICAL FRACTURE: ICD-10-CM

## 2018-12-17 DIAGNOSIS — R93.0 ABNORMAL CT OF THE HEAD: ICD-10-CM

## 2018-12-17 DIAGNOSIS — F02.818 LATE ONSET ALZHEIMER'S DISEASE WITH BEHAVIORAL DISTURBANCE (HCC): Primary | ICD-10-CM

## 2018-12-17 DIAGNOSIS — R41.3 MEMORY LOSS: ICD-10-CM

## 2018-12-17 DIAGNOSIS — G30.1 LATE ONSET ALZHEIMER'S DISEASE WITH BEHAVIORAL DISTURBANCE (HCC): Primary | ICD-10-CM

## 2018-12-17 DIAGNOSIS — I27.20 PULMONARY HYPERTENSION (HCC): ICD-10-CM

## 2018-12-17 DIAGNOSIS — E78.5 HYPERLIPIDEMIA WITH TARGET LDL LESS THAN 100: ICD-10-CM

## 2018-12-17 DIAGNOSIS — I10 ESSENTIAL HYPERTENSION: ICD-10-CM

## 2018-12-17 DIAGNOSIS — Z95.0 CARDIAC PACEMAKER: ICD-10-CM

## 2018-12-17 PROBLEM — F09 COGNITIVE DYSFUNCTION: Status: RESOLVED | Noted: 2018-09-14 | Resolved: 2018-12-17

## 2018-12-17 PROCEDURE — 1036F TOBACCO NON-USER: CPT | Performed by: FAMILY MEDICINE

## 2018-12-17 PROCEDURE — 99213 OFFICE O/P EST LOW 20 MIN: CPT | Performed by: FAMILY MEDICINE

## 2018-12-17 PROCEDURE — G8482 FLU IMMUNIZE ORDER/ADMIN: HCPCS | Performed by: FAMILY MEDICINE

## 2018-12-17 PROCEDURE — G8399 PT W/DXA RESULTS DOCUMENT: HCPCS | Performed by: FAMILY MEDICINE

## 2018-12-17 PROCEDURE — 1090F PRES/ABSN URINE INCON ASSESS: CPT | Performed by: FAMILY MEDICINE

## 2018-12-17 PROCEDURE — 4040F PNEUMOC VAC/ADMIN/RCVD: CPT | Performed by: FAMILY MEDICINE

## 2018-12-17 PROCEDURE — G8427 DOCREV CUR MEDS BY ELIG CLIN: HCPCS | Performed by: FAMILY MEDICINE

## 2018-12-17 PROCEDURE — 1101F PT FALLS ASSESS-DOCD LE1/YR: CPT | Performed by: FAMILY MEDICINE

## 2018-12-17 PROCEDURE — G8420 CALC BMI NORM PARAMETERS: HCPCS | Performed by: FAMILY MEDICINE

## 2018-12-17 PROCEDURE — 1123F ACP DISCUSS/DSCN MKR DOCD: CPT | Performed by: FAMILY MEDICINE

## 2018-12-17 ASSESSMENT — ENCOUNTER SYMPTOMS
BLOOD IN STOOL: 0
VOMITING: 0
CONSTIPATION: 0
NAUSEA: 0
SINUS PRESSURE: 0
ABDOMINAL PAIN: 0
SORE THROAT: 0
RHINORRHEA: 0
CHEST TIGHTNESS: 0
WHEEZING: 0
SHORTNESS OF BREATH: 0
DIARRHEA: 0
COUGH: 0
BACK PAIN: 0

## 2018-12-17 ASSESSMENT — PATIENT HEALTH QUESTIONNAIRE - PHQ9
2. FEELING DOWN, DEPRESSED OR HOPELESS: 0
SUM OF ALL RESPONSES TO PHQ9 QUESTIONS 1 & 2: 0
SUM OF ALL RESPONSES TO PHQ QUESTIONS 1-9: 0
1. LITTLE INTEREST OR PLEASURE IN DOING THINGS: 0
SUM OF ALL RESPONSES TO PHQ QUESTIONS 1-9: 0

## 2019-01-01 ENCOUNTER — OFFICE VISIT (OUTPATIENT)
Dept: FAMILY MEDICINE CLINIC | Age: 84
End: 2019-01-01
Payer: MEDICARE

## 2019-01-01 ENCOUNTER — HOSPITAL ENCOUNTER (EMERGENCY)
Age: 84
Discharge: HOME | End: 2019-10-02
Attending: EMERGENCY MEDICINE
Payer: MEDICARE

## 2019-01-01 ENCOUNTER — TELEPHONE (OUTPATIENT)
Dept: FAMILY MEDICINE CLINIC | Age: 84
End: 2019-01-01

## 2019-01-01 VITALS
HEIGHT: 62 IN | DIASTOLIC BLOOD PRESSURE: 45 MMHG | RESPIRATION RATE: 18 BRPM | TEMPERATURE: 97.7 F | OXYGEN SATURATION: 100 % | SYSTOLIC BLOOD PRESSURE: 183 MMHG | BODY MASS INDEX: 19.69 KG/M2 | WEIGHT: 107 LBS | HEART RATE: 77 BPM

## 2019-01-01 VITALS
HEIGHT: 62 IN | RESPIRATION RATE: 16 BRPM | HEART RATE: 60 BPM | SYSTOLIC BLOOD PRESSURE: 182 MMHG | BODY MASS INDEX: 19.51 KG/M2 | WEIGHT: 106 LBS | DIASTOLIC BLOOD PRESSURE: 80 MMHG

## 2019-01-01 VITALS
WEIGHT: 108 LBS | OXYGEN SATURATION: 98 % | HEART RATE: 72 BPM | RESPIRATION RATE: 14 BRPM | BODY MASS INDEX: 19.75 KG/M2 | SYSTOLIC BLOOD PRESSURE: 210 MMHG | DIASTOLIC BLOOD PRESSURE: 90 MMHG

## 2019-01-01 VITALS
RESPIRATION RATE: 16 BRPM | OXYGEN SATURATION: 95 % | HEIGHT: 62 IN | HEART RATE: 60 BPM | WEIGHT: 106 LBS | BODY MASS INDEX: 19.51 KG/M2 | DIASTOLIC BLOOD PRESSURE: 70 MMHG | SYSTOLIC BLOOD PRESSURE: 120 MMHG

## 2019-01-01 DIAGNOSIS — G30.1 LATE ONSET ALZHEIMER'S DISEASE WITH BEHAVIORAL DISTURBANCE (HCC): ICD-10-CM

## 2019-01-01 DIAGNOSIS — F03.92 HALLUCINATIONS DUE TO LATE ONSET DEMENTIA: ICD-10-CM

## 2019-01-01 DIAGNOSIS — Z51.89 VISIT FOR WOUND CHECK: Primary | ICD-10-CM

## 2019-01-01 DIAGNOSIS — Z00.00 ROUTINE GENERAL MEDICAL EXAMINATION AT A HEALTH CARE FACILITY: Primary | ICD-10-CM

## 2019-01-01 DIAGNOSIS — Z94.5 HISTORY OF SKIN GRAFT: ICD-10-CM

## 2019-01-01 DIAGNOSIS — I10 ESSENTIAL HYPERTENSION: ICD-10-CM

## 2019-01-01 DIAGNOSIS — F02.818 LATE ONSET ALZHEIMER'S DISEASE WITH BEHAVIORAL DISTURBANCE (HCC): ICD-10-CM

## 2019-01-01 DIAGNOSIS — I10 ACCELERATED HYPERTENSION: Primary | ICD-10-CM

## 2019-01-01 DIAGNOSIS — Z23 NEED FOR INFLUENZA VACCINATION: ICD-10-CM

## 2019-01-01 DIAGNOSIS — F03.90 DEMENTIA WITHOUT BEHAVIORAL DISTURBANCE, UNSPECIFIED DEMENTIA TYPE: ICD-10-CM

## 2019-01-01 LAB
A/G RATIO: 0.7 (ref 1.1–2.2)
ALBUMIN SERPL-MCNC: 2.8 G/DL (ref 3.4–5)
ALP BLD-CCNC: 91 U/L (ref 40–129)
ALT SERPL-CCNC: 34 U/L (ref 10–40)
ANION GAP SERPL CALCULATED.3IONS-SCNC: 9 MMOL/L (ref 3–16)
AST SERPL-CCNC: 50 U/L (ref 15–37)
BILIRUB SERPL-MCNC: 0.7 MG/DL (ref 0–1)
BUN BLDV-MCNC: 14 MG/DL (ref 7–20)
CALCIUM SERPL-MCNC: 9 MG/DL (ref 8.3–10.6)
CHLORIDE BLD-SCNC: 105 MMOL/L (ref 99–110)
CHOLESTEROL, TOTAL: 179 MG/DL (ref 0–199)
CO2: 29 MMOL/L (ref 21–32)
CREAT SERPL-MCNC: 0.6 MG/DL (ref 0.6–1.2)
GFR AFRICAN AMERICAN: >60
GFR NON-AFRICAN AMERICAN: >60
GLOBULIN: 4 G/DL
GLUCOSE BLD-MCNC: 78 MG/DL (ref 70–99)
HDLC SERPL-MCNC: 58 MG/DL (ref 40–60)
LDL CHOLESTEROL CALCULATED: 102 MG/DL
POTASSIUM SERPL-SCNC: 3.7 MMOL/L (ref 3.5–5.1)
SODIUM BLD-SCNC: 143 MMOL/L (ref 136–145)
TOTAL PROTEIN: 6.8 G/DL (ref 6.4–8.2)
TRIGL SERPL-MCNC: 95 MG/DL (ref 0–150)
VLDLC SERPL CALC-MCNC: 19 MG/DL

## 2019-01-01 PROCEDURE — G8482 FLU IMMUNIZE ORDER/ADMIN: HCPCS | Performed by: FAMILY MEDICINE

## 2019-01-01 PROCEDURE — G8420 CALC BMI NORM PARAMETERS: HCPCS | Performed by: FAMILY MEDICINE

## 2019-01-01 PROCEDURE — G8399 PT W/DXA RESULTS DOCUMENT: HCPCS | Performed by: FAMILY MEDICINE

## 2019-01-01 PROCEDURE — 2500000003 HC RX 250 WO HCPCS: Performed by: EMERGENCY MEDICINE

## 2019-01-01 PROCEDURE — 1036F TOBACCO NON-USER: CPT | Performed by: FAMILY MEDICINE

## 2019-01-01 PROCEDURE — 1090F PRES/ABSN URINE INCON ASSESS: CPT | Performed by: FAMILY MEDICINE

## 2019-01-01 PROCEDURE — 36415 COLL VENOUS BLD VENIPUNCTURE: CPT | Performed by: FAMILY MEDICINE

## 2019-01-01 PROCEDURE — 99213 OFFICE O/P EST LOW 20 MIN: CPT | Performed by: FAMILY MEDICINE

## 2019-01-01 PROCEDURE — G0008 ADMIN INFLUENZA VIRUS VAC: HCPCS | Performed by: FAMILY MEDICINE

## 2019-01-01 PROCEDURE — G8427 DOCREV CUR MEDS BY ELIG CLIN: HCPCS | Performed by: FAMILY MEDICINE

## 2019-01-01 PROCEDURE — 99283 EMERGENCY DEPT VISIT LOW MDM: CPT

## 2019-01-01 PROCEDURE — 4040F PNEUMOC VAC/ADMIN/RCVD: CPT | Performed by: FAMILY MEDICINE

## 2019-01-01 PROCEDURE — 1123F ACP DISCUSS/DSCN MKR DOCD: CPT | Performed by: FAMILY MEDICINE

## 2019-01-01 PROCEDURE — G0439 PPPS, SUBSEQ VISIT: HCPCS | Performed by: FAMILY MEDICINE

## 2019-01-01 PROCEDURE — 96374 THER/PROPH/DIAG INJ IV PUSH: CPT

## 2019-01-01 PROCEDURE — 90686 IIV4 VACC NO PRSV 0.5 ML IM: CPT | Performed by: FAMILY MEDICINE

## 2019-01-01 RX ORDER — METOPROLOL TARTRATE 100 MG/1
TABLET ORAL
Qty: 180 TABLET | Refills: 3 | Status: ON HOLD | OUTPATIENT
Start: 2019-01-01 | End: 2020-01-01 | Stop reason: SDUPTHER

## 2019-01-01 RX ORDER — TRANEXAMIC ACID 100 MG/ML
15 INJECTION, SOLUTION INTRAVENOUS ONCE
Status: COMPLETED | OUTPATIENT
Start: 2019-01-01 | End: 2019-01-01

## 2019-01-01 RX ORDER — LOSARTAN POTASSIUM 100 MG/1
TABLET ORAL
Qty: 90 TABLET | Refills: 3 | Status: ON HOLD | OUTPATIENT
Start: 2019-01-01 | End: 2020-01-01 | Stop reason: HOSPADM

## 2019-01-01 RX ADMIN — TRANEXAMIC ACID 728 MG: 1 INJECTION, SOLUTION INTRAVENOUS at 14:06

## 2019-01-01 ASSESSMENT — ENCOUNTER SYMPTOMS
DIARRHEA: 0
ABDOMINAL PAIN: 0
BACK PAIN: 0
CHEST TIGHTNESS: 0
SINUS PRESSURE: 0
CONSTIPATION: 0
ALLERGIC/IMMUNOLOGIC NEGATIVE: 1
VOMITING: 0
COUGH: 0
ABDOMINAL PAIN: 0
NAUSEA: 0
SINUS PRESSURE: 0
SHORTNESS OF BREATH: 0
RESPIRATORY NEGATIVE: 1
WHEEZING: 0
BLOOD IN STOOL: 0
BLOOD IN STOOL: 0
COUGH: 0
SORE THROAT: 0
EYES NEGATIVE: 1
NAUSEA: 0
VOMITING: 0
WHEEZING: 0
RHINORRHEA: 0
RHINORRHEA: 0
GASTROINTESTINAL NEGATIVE: 1
SORE THROAT: 0
DIARRHEA: 0
BACK PAIN: 0
SHORTNESS OF BREATH: 0
CHEST TIGHTNESS: 0
CONSTIPATION: 0

## 2019-01-01 ASSESSMENT — PAIN SCALES - GENERAL: PAINLEVEL_OUTOF10: 7

## 2019-01-01 ASSESSMENT — LIFESTYLE VARIABLES: HOW OFTEN DO YOU HAVE A DRINK CONTAINING ALCOHOL: 0

## 2019-01-01 ASSESSMENT — PAIN DESCRIPTION - PAIN TYPE: TYPE: ACUTE PAIN

## 2019-01-01 ASSESSMENT — PAIN DESCRIPTION - DESCRIPTORS: DESCRIPTORS: ACHING;SHARP

## 2019-01-01 ASSESSMENT — PATIENT HEALTH QUESTIONNAIRE - PHQ9
SUM OF ALL RESPONSES TO PHQ QUESTIONS 1-9: 1
SUM OF ALL RESPONSES TO PHQ QUESTIONS 1-9: 1

## 2019-01-01 ASSESSMENT — PAIN DESCRIPTION - LOCATION: LOCATION: FACE

## 2019-01-04 DIAGNOSIS — I10 ESSENTIAL HYPERTENSION: ICD-10-CM

## 2019-01-04 RX ORDER — METOPROLOL TARTRATE 100 MG/1
TABLET ORAL
Qty: 180 TABLET | Refills: 3 | Status: SHIPPED | OUTPATIENT
Start: 2019-01-04 | End: 2019-01-16 | Stop reason: ALTCHOICE

## 2019-01-16 ENCOUNTER — OFFICE VISIT (OUTPATIENT)
Dept: FAMILY MEDICINE CLINIC | Age: 84
End: 2019-01-16
Payer: MEDICARE

## 2019-01-16 VITALS
OXYGEN SATURATION: 97 % | HEART RATE: 72 BPM | HEIGHT: 62 IN | BODY MASS INDEX: 19.9 KG/M2 | WEIGHT: 108.13 LBS | RESPIRATION RATE: 16 BRPM | DIASTOLIC BLOOD PRESSURE: 80 MMHG | SYSTOLIC BLOOD PRESSURE: 136 MMHG

## 2019-01-16 DIAGNOSIS — F03.92 HALLUCINATIONS DUE TO LATE ONSET DEMENTIA: ICD-10-CM

## 2019-01-16 DIAGNOSIS — F02.818 LATE ONSET ALZHEIMER'S DISEASE WITH BEHAVIORAL DISTURBANCE (HCC): ICD-10-CM

## 2019-01-16 DIAGNOSIS — G30.1 LATE ONSET ALZHEIMER'S DISEASE WITH BEHAVIORAL DISTURBANCE (HCC): ICD-10-CM

## 2019-01-16 DIAGNOSIS — D32.9 MENINGIOMA (HCC): ICD-10-CM

## 2019-01-16 DIAGNOSIS — F33.41 RECURRENT MAJOR DEPRESSIVE DISORDER, IN PARTIAL REMISSION (HCC): ICD-10-CM

## 2019-01-16 DIAGNOSIS — I27.20 PULMONARY HYPERTENSION (HCC): ICD-10-CM

## 2019-01-16 DIAGNOSIS — I10 ESSENTIAL HYPERTENSION: Primary | ICD-10-CM

## 2019-01-16 PROCEDURE — 1123F ACP DISCUSS/DSCN MKR DOCD: CPT | Performed by: FAMILY MEDICINE

## 2019-01-16 PROCEDURE — 1036F TOBACCO NON-USER: CPT | Performed by: FAMILY MEDICINE

## 2019-01-16 PROCEDURE — 4040F PNEUMOC VAC/ADMIN/RCVD: CPT | Performed by: FAMILY MEDICINE

## 2019-01-16 PROCEDURE — G8427 DOCREV CUR MEDS BY ELIG CLIN: HCPCS | Performed by: FAMILY MEDICINE

## 2019-01-16 PROCEDURE — 99214 OFFICE O/P EST MOD 30 MIN: CPT | Performed by: FAMILY MEDICINE

## 2019-01-16 PROCEDURE — G8399 PT W/DXA RESULTS DOCUMENT: HCPCS | Performed by: FAMILY MEDICINE

## 2019-01-16 PROCEDURE — G8482 FLU IMMUNIZE ORDER/ADMIN: HCPCS | Performed by: FAMILY MEDICINE

## 2019-01-16 PROCEDURE — G8420 CALC BMI NORM PARAMETERS: HCPCS | Performed by: FAMILY MEDICINE

## 2019-01-16 PROCEDURE — 1101F PT FALLS ASSESS-DOCD LE1/YR: CPT | Performed by: FAMILY MEDICINE

## 2019-01-16 PROCEDURE — 1090F PRES/ABSN URINE INCON ASSESS: CPT | Performed by: FAMILY MEDICINE

## 2019-01-16 ASSESSMENT — ENCOUNTER SYMPTOMS
RHINORRHEA: 0
VOMITING: 0
ABDOMINAL PAIN: 0
NAUSEA: 0
SHORTNESS OF BREATH: 0
SORE THROAT: 0
WHEEZING: 0
SINUS PRESSURE: 0
BLOOD IN STOOL: 0
COUGH: 0
DIARRHEA: 0
CHEST TIGHTNESS: 0
BACK PAIN: 0
CONSTIPATION: 0

## 2019-01-16 ASSESSMENT — PATIENT HEALTH QUESTIONNAIRE - PHQ9
SUM OF ALL RESPONSES TO PHQ QUESTIONS 1-9: 0
SUM OF ALL RESPONSES TO PHQ9 QUESTIONS 1 & 2: 0
1. LITTLE INTEREST OR PLEASURE IN DOING THINGS: 0
SUM OF ALL RESPONSES TO PHQ QUESTIONS 1-9: 0
2. FEELING DOWN, DEPRESSED OR HOPELESS: 0

## 2019-04-16 ENCOUNTER — OFFICE VISIT (OUTPATIENT)
Dept: FAMILY MEDICINE CLINIC | Age: 84
End: 2019-04-16
Payer: MEDICARE

## 2019-04-16 VITALS
BODY MASS INDEX: 19.88 KG/M2 | OXYGEN SATURATION: 98 % | HEIGHT: 62 IN | HEART RATE: 73 BPM | SYSTOLIC BLOOD PRESSURE: 154 MMHG | RESPIRATION RATE: 16 BRPM | DIASTOLIC BLOOD PRESSURE: 80 MMHG | WEIGHT: 108 LBS

## 2019-04-16 DIAGNOSIS — G30.1 LATE ONSET ALZHEIMER'S DISEASE WITH BEHAVIORAL DISTURBANCE (HCC): Primary | ICD-10-CM

## 2019-04-16 DIAGNOSIS — F33.41 RECURRENT MAJOR DEPRESSIVE DISORDER, IN PARTIAL REMISSION (HCC): ICD-10-CM

## 2019-04-16 DIAGNOSIS — F03.92 HALLUCINATIONS DUE TO LATE ONSET DEMENTIA: ICD-10-CM

## 2019-04-16 DIAGNOSIS — F02.818 LATE ONSET ALZHEIMER'S DISEASE WITH BEHAVIORAL DISTURBANCE (HCC): Primary | ICD-10-CM

## 2019-04-16 PROCEDURE — G8420 CALC BMI NORM PARAMETERS: HCPCS | Performed by: FAMILY MEDICINE

## 2019-04-16 PROCEDURE — 99213 OFFICE O/P EST LOW 20 MIN: CPT | Performed by: FAMILY MEDICINE

## 2019-04-16 PROCEDURE — 1036F TOBACCO NON-USER: CPT | Performed by: FAMILY MEDICINE

## 2019-04-16 PROCEDURE — 4040F PNEUMOC VAC/ADMIN/RCVD: CPT | Performed by: FAMILY MEDICINE

## 2019-04-16 PROCEDURE — G8427 DOCREV CUR MEDS BY ELIG CLIN: HCPCS | Performed by: FAMILY MEDICINE

## 2019-04-16 PROCEDURE — 1123F ACP DISCUSS/DSCN MKR DOCD: CPT | Performed by: FAMILY MEDICINE

## 2019-04-16 PROCEDURE — G8399 PT W/DXA RESULTS DOCUMENT: HCPCS | Performed by: FAMILY MEDICINE

## 2019-04-16 PROCEDURE — 1090F PRES/ABSN URINE INCON ASSESS: CPT | Performed by: FAMILY MEDICINE

## 2019-04-16 RX ORDER — DONEPEZIL HYDROCHLORIDE 5 MG/1
5 TABLET, FILM COATED ORAL NIGHTLY
Qty: 30 TABLET | Refills: 5 | Status: SHIPPED | OUTPATIENT
Start: 2019-04-16 | End: 2019-05-17 | Stop reason: SDUPTHER

## 2019-04-16 ASSESSMENT — ENCOUNTER SYMPTOMS
SORE THROAT: 0
BLOOD IN STOOL: 0
SHORTNESS OF BREATH: 0
VOMITING: 0
DIARRHEA: 0
BACK PAIN: 0
RHINORRHEA: 0
COUGH: 0
CONSTIPATION: 0
CHEST TIGHTNESS: 0
ABDOMINAL PAIN: 0
SINUS PRESSURE: 0
NAUSEA: 0
WHEEZING: 0

## 2019-04-16 ASSESSMENT — PATIENT HEALTH QUESTIONNAIRE - PHQ9
2. FEELING DOWN, DEPRESSED OR HOPELESS: 1
SUM OF ALL RESPONSES TO PHQ QUESTIONS 1-9: 1
SUM OF ALL RESPONSES TO PHQ9 QUESTIONS 1 & 2: 1
1. LITTLE INTEREST OR PLEASURE IN DOING THINGS: 0
SUM OF ALL RESPONSES TO PHQ QUESTIONS 1-9: 1

## 2019-04-16 NOTE — PROGRESS NOTES
2019     Sharri Wren (:  1935) is a 80 y.o. female, here for evaluation of the following medical concerns:    Discussed pt leaving home and  afraid that she'll get loss. Forgetful. Leaves the water on and pt  makes sure she is not cooking. She is refusing to take aricept or other memory problems. Not seeing people in the house. Much better for a week. Review of Systems   Constitutional: Negative for activity change, fatigue and fever. HENT: Negative for congestion, ear pain, rhinorrhea, sinus pressure and sore throat. Eyes: Negative for visual disturbance. Respiratory: Negative for cough, chest tightness, shortness of breath and wheezing. Cardiovascular: Negative for chest pain, palpitations and leg swelling. Gastrointestinal: Negative for abdominal pain, blood in stool, constipation, diarrhea, nausea and vomiting. Genitourinary: Negative for dysuria, flank pain, hematuria and urgency. Musculoskeletal: Negative for arthralgias, back pain and joint swelling. Skin: Negative for rash. Neurological: Negative for weakness, numbness and headaches. Very forgetful, gets lost   Psychiatric/Behavioral: Positive for behavioral problems and hallucinations. Prior to Visit Medications    Not on File        Social History     Tobacco Use    Smoking status: Never Smoker    Smokeless tobacco: Never Used   Substance Use Topics    Alcohol use: No        Vitals:    19 1317   BP: (!) 154/80   Site: Right Upper Arm   Position: Sitting   Cuff Size: Medium Adult   Pulse: 73   Resp: 16   SpO2: 98%   Weight: 108 lb (49 kg)   Height: 5' 2\" (1.575 m)     Estimated body mass index is 19.75 kg/m² as calculated from the following:    Height as of this encounter: 5' 2\" (1.575 m). Weight as of this encounter: 108 lb (49 kg). Physical Exam   Constitutional: She is oriented to person, place, and time. She appears well-developed and well-nourished. HENT:   Head: Normocephalic and atraumatic. Right Ear: External ear normal.   Left Ear: External ear normal.   Nose: Nose normal.   Mouth/Throat: Oropharynx is clear and moist.   Eyes: Pupils are equal, round, and reactive to light. Conjunctivae and EOM are normal.   Neck: Normal range of motion. Neck supple. No JVD present. No tracheal deviation present. No thyromegaly present. Cardiovascular: Normal rate, regular rhythm, normal heart sounds and intact distal pulses. Pulmonary/Chest: Effort normal and breath sounds normal. She has no wheezes. She has no rales. Abdominal: Soft. Bowel sounds are normal. She exhibits no mass. Musculoskeletal: Normal range of motion. She exhibits no edema. Lymphadenopathy:     She has no cervical adenopathy. Neurological: She is alert and oriented to person, place, and time. She has normal reflexes. Forgetful noted during this visit   Skin: Skin is warm and dry. No rash noted. Psychiatric: She has a normal mood and affect. Her behavior is normal.   Angry about suggtion that she has memory problems       ASSESSMENT/PLAN:  1. Late onset Alzheimer's disease with behavioral disturbance  Discussed, will try aricept  - donepezil (ARICEPT) 5 MG tablet; Take 1 tablet by mouth nightly  Dispense: 30 tablet; Refill: 5    2. Recurrent major depressive disorder, in partial remission ([facility] [address]      3. 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 An electronic signature was used to authenticate this note.     --Tereso Beckford MD on 4/16/2019 at 2:04 PM

## 2019-05-17 ENCOUNTER — OFFICE VISIT (OUTPATIENT)
Dept: FAMILY MEDICINE CLINIC | Age: 84
End: 2019-05-17
Payer: MEDICARE

## 2019-05-17 VITALS
OXYGEN SATURATION: 96 % | DIASTOLIC BLOOD PRESSURE: 90 MMHG | HEIGHT: 62 IN | RESPIRATION RATE: 16 BRPM | HEART RATE: 71 BPM | SYSTOLIC BLOOD PRESSURE: 162 MMHG | WEIGHT: 111.13 LBS | BODY MASS INDEX: 20.45 KG/M2

## 2019-05-17 DIAGNOSIS — G30.1 LATE ONSET ALZHEIMER'S DISEASE WITH BEHAVIORAL DISTURBANCE (HCC): ICD-10-CM

## 2019-05-17 DIAGNOSIS — F02.818 LATE ONSET ALZHEIMER'S DISEASE WITH BEHAVIORAL DISTURBANCE (HCC): ICD-10-CM

## 2019-05-17 DIAGNOSIS — F03.92 HALLUCINATIONS DUE TO LATE ONSET DEMENTIA: Primary | ICD-10-CM

## 2019-05-17 PROCEDURE — G8420 CALC BMI NORM PARAMETERS: HCPCS | Performed by: FAMILY MEDICINE

## 2019-05-17 PROCEDURE — G8427 DOCREV CUR MEDS BY ELIG CLIN: HCPCS | Performed by: FAMILY MEDICINE

## 2019-05-17 PROCEDURE — 4040F PNEUMOC VAC/ADMIN/RCVD: CPT | Performed by: FAMILY MEDICINE

## 2019-05-17 PROCEDURE — 99213 OFFICE O/P EST LOW 20 MIN: CPT | Performed by: FAMILY MEDICINE

## 2019-05-17 PROCEDURE — 1090F PRES/ABSN URINE INCON ASSESS: CPT | Performed by: FAMILY MEDICINE

## 2019-05-17 PROCEDURE — G8399 PT W/DXA RESULTS DOCUMENT: HCPCS | Performed by: FAMILY MEDICINE

## 2019-05-17 PROCEDURE — 1123F ACP DISCUSS/DSCN MKR DOCD: CPT | Performed by: FAMILY MEDICINE

## 2019-05-17 PROCEDURE — 1036F TOBACCO NON-USER: CPT | Performed by: FAMILY MEDICINE

## 2019-05-17 RX ORDER — DONEPEZIL HYDROCHLORIDE 10 MG/1
10 TABLET, FILM COATED ORAL NIGHTLY
Qty: 30 TABLET | Refills: 5
Start: 2019-05-17 | End: 2019-07-17 | Stop reason: SDUPTHER

## 2019-05-17 RX ORDER — QUETIAPINE FUMARATE 25 MG/1
25 TABLET, FILM COATED ORAL NIGHTLY
Qty: 30 TABLET | Refills: 3 | Status: SHIPPED | OUTPATIENT
Start: 2019-05-17 | End: 2019-07-17 | Stop reason: SDUPTHER

## 2019-05-17 ASSESSMENT — PATIENT HEALTH QUESTIONNAIRE - PHQ9
SUM OF ALL RESPONSES TO PHQ QUESTIONS 1-9: 1
SUM OF ALL RESPONSES TO PHQ9 QUESTIONS 1 & 2: 1
1. LITTLE INTEREST OR PLEASURE IN DOING THINGS: 0
SUM OF ALL RESPONSES TO PHQ QUESTIONS 1-9: 1
2. FEELING DOWN, DEPRESSED OR HOPELESS: 1

## 2019-05-17 NOTE — PROGRESS NOTES
2019     Denisse Ann (:  1935) is a 80 y.o. female, here for evaluation of the following medical concerns:    Seeing people in the house. Talking to parents who've been dead for years. No wandering no dangerous behavior. ? Better on aricept    Remembers the past real well but not making new memories  Well. Smiling pleasant, no evidence of previous parinoia        Review of Systems   Constitutional: Negative for activity change, fatigue and fever. HENT: Negative for congestion, ear pain, rhinorrhea, sinus pressure and sore throat. Eyes: Negative for visual disturbance. Respiratory: Negative for cough, chest tightness, shortness of breath and wheezing. Cardiovascular: Negative for chest pain, palpitations and leg swelling. Gastrointestinal: Negative for abdominal pain, blood in stool, constipation, diarrhea, nausea and vomiting. Genitourinary: Negative for dysuria, flank pain, hematuria and urgency. Musculoskeletal: Negative for arthralgias, back pain and joint swelling. Skin: Negative for rash. Neurological: Negative for weakness, numbness and headaches. Very forgetful, gets lost   Psychiatric/Behavioral: Positive for behavioral problems and hallucinations. Sees people other people don't see. Keeps thinking she's seen her parents trina have been gone  Many years. Prior to Visit Medications    Medication Sig Taking?  Authorizing Provider   donepezil (ARICEPT) 5 MG tablet Take 1 tablet by mouth nightly Yes Elena Leone MD        Social History     Tobacco Use    Smoking status: Never Smoker    Smokeless tobacco: Never Used   Substance Use Topics    Alcohol use: No        Vitals:    19 1400   BP: (!) 162/90   Site: Left Upper Arm   Position: Sitting   Cuff Size: Medium Adult   Pulse: 71   Resp: 16   SpO2: 96%   Weight: 111 lb 2 oz (50.4 kg)   Height: 5' 2\" (1.575 m)     Estimated body mass index is 20.33 kg/m² as calculated from the following:    Height as of this encounter: 5' 2\" (1.575 m). Weight as of this encounter: 111 lb 2 oz (50.4 kg). Physical Exam   Constitutional: She is oriented to person, place, and time. She appears well-developed and well-nourished. HENT:   Head: Normocephalic and atraumatic. Right Ear: External ear normal.   Left Ear: External ear normal.   Nose: Nose normal.   Mouth/Throat: Oropharynx is clear and moist.   Eyes: Pupils are equal, round, and reactive to light. Conjunctivae and EOM are normal.   Neck: Normal range of motion. Neck supple. No JVD present. No tracheal deviation present. No thyromegaly present. Cardiovascular: Normal rate, regular rhythm, normal heart sounds and intact distal pulses. Pulmonary/Chest: Effort normal and breath sounds normal. She has no wheezes. She has no rales. Abdominal: Soft. Bowel sounds are normal. She exhibits no mass. Musculoskeletal: Normal range of motion. She exhibits no edema. Lymphadenopathy:     She has no cervical adenopathy. Neurological: She is alert and oriented to person, place, and time. She has normal reflexes. Forgetful noted during this visit   Skin: Skin is warm and dry. No rash noted. Psychiatric: She has a normal mood and affect. Her behavior is normal.   Pleasant. Freely admits to seeing people others don't see. No longer trying to drive. ASSESSMENT/PLAN:  1. Late onset Alzheimer's disease with behavioral disturbance  Increase from 5  To 10 mg  - donepezil (ARICEPT) 10 MG tablet; Take 1 tablet by mouth nightly  Dispense: 30 tablet; Refill: 5    2. 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 Discussed increased cardiac risk and sudden death.  - QUEtiapine (SEROQUEL) 25 MG tablet; Take 1 tablet by mouth nightly  Dispense: 30 tablet; Refill: 3      Return in about 2 months (around 7/17/2019), or if symptoms worsen or fail to improve. An electronic signature was used to authenticate this note.     --Kasandra Joshi Nicki Newby MD on 5/19/2019 at 6:25 PM

## 2019-05-19 ASSESSMENT — ENCOUNTER SYMPTOMS
CONSTIPATION: 0
SINUS PRESSURE: 0
BACK PAIN: 0
VOMITING: 0
SHORTNESS OF BREATH: 0
CHEST TIGHTNESS: 0
SORE THROAT: 0
RHINORRHEA: 0
NAUSEA: 0
WHEEZING: 0
DIARRHEA: 0
COUGH: 0
ABDOMINAL PAIN: 0
BLOOD IN STOOL: 0

## 2019-05-22 ENCOUNTER — TELEPHONE (OUTPATIENT)
Dept: FAMILY MEDICINE CLINIC | Age: 84
End: 2019-05-22

## 2019-05-22 DIAGNOSIS — F02.818 LATE ONSET ALZHEIMER'S DISEASE WITH BEHAVIORAL DISTURBANCE (HCC): ICD-10-CM

## 2019-05-22 DIAGNOSIS — G30.1 LATE ONSET ALZHEIMER'S DISEASE WITH BEHAVIORAL DISTURBANCE (HCC): ICD-10-CM

## 2019-05-22 DIAGNOSIS — R41.3 MEMORY LOSS: Primary | ICD-10-CM

## 2019-05-22 NOTE — TELEPHONE ENCOUNTER
called and asked if he could please get a referral for a neurologist due to pt is having a hard time remembering and would like to speak with a neurologist.       Please advise.

## 2019-06-26 ENCOUNTER — HOSPITAL ENCOUNTER (OUTPATIENT)
Age: 84
Discharge: HOME | End: 2019-06-26
Payer: MEDICARE

## 2019-06-26 LAB
ERYTHROCYTE SEDIMENTATION RATE: 19 MM/HR (ref 0–30)
FOLATE: 12.9 NG/ML (ref 3.1–17.5)
TSH HIGH SENSITIVITY: 1.38 UIU/ML (ref 0.27–4.2)
VITAMIN B-12: 1347 PG/ML (ref 211–911)

## 2019-06-26 PROCEDURE — 82607 VITAMIN B-12: CPT

## 2019-06-26 PROCEDURE — 86038 ANTINUCLEAR ANTIBODIES: CPT

## 2019-06-26 PROCEDURE — 84443 ASSAY THYROID STIM HORMONE: CPT

## 2019-06-26 PROCEDURE — 36415 COLL VENOUS BLD VENIPUNCTURE: CPT

## 2019-06-26 PROCEDURE — 82746 ASSAY OF FOLIC ACID SERUM: CPT

## 2019-06-26 PROCEDURE — 85652 RBC SED RATE AUTOMATED: CPT

## 2019-06-28 LAB
ANTI-NUCLEAR ANTIBODY (ANA): NORMAL
ANTI-NUCLEAR ANTIBODY (ANA): NORMAL

## 2019-07-17 ENCOUNTER — OFFICE VISIT (OUTPATIENT)
Dept: FAMILY MEDICINE CLINIC | Age: 84
End: 2019-07-17
Payer: MEDICARE

## 2019-07-17 VITALS
OXYGEN SATURATION: 97 % | BODY MASS INDEX: 19.68 KG/M2 | RESPIRATION RATE: 16 BRPM | SYSTOLIC BLOOD PRESSURE: 160 MMHG | WEIGHT: 107.6 LBS | DIASTOLIC BLOOD PRESSURE: 86 MMHG | HEART RATE: 71 BPM

## 2019-07-17 DIAGNOSIS — F02.818 LATE ONSET ALZHEIMER'S DISEASE WITH BEHAVIORAL DISTURBANCE (HCC): ICD-10-CM

## 2019-07-17 DIAGNOSIS — L98.9 NON-HEALING SKIN LESION OF NOSE: Primary | ICD-10-CM

## 2019-07-17 DIAGNOSIS — F03.92 HALLUCINATIONS DUE TO LATE ONSET DEMENTIA: ICD-10-CM

## 2019-07-17 DIAGNOSIS — I10 ESSENTIAL HYPERTENSION: ICD-10-CM

## 2019-07-17 DIAGNOSIS — G30.1 LATE ONSET ALZHEIMER'S DISEASE WITH BEHAVIORAL DISTURBANCE (HCC): ICD-10-CM

## 2019-07-17 PROCEDURE — 1123F ACP DISCUSS/DSCN MKR DOCD: CPT | Performed by: FAMILY MEDICINE

## 2019-07-17 PROCEDURE — G8420 CALC BMI NORM PARAMETERS: HCPCS | Performed by: FAMILY MEDICINE

## 2019-07-17 PROCEDURE — 1036F TOBACCO NON-USER: CPT | Performed by: FAMILY MEDICINE

## 2019-07-17 PROCEDURE — G8427 DOCREV CUR MEDS BY ELIG CLIN: HCPCS | Performed by: FAMILY MEDICINE

## 2019-07-17 PROCEDURE — 4040F PNEUMOC VAC/ADMIN/RCVD: CPT | Performed by: FAMILY MEDICINE

## 2019-07-17 PROCEDURE — 1090F PRES/ABSN URINE INCON ASSESS: CPT | Performed by: FAMILY MEDICINE

## 2019-07-17 PROCEDURE — 99214 OFFICE O/P EST MOD 30 MIN: CPT | Performed by: FAMILY MEDICINE

## 2019-07-17 PROCEDURE — G8399 PT W/DXA RESULTS DOCUMENT: HCPCS | Performed by: FAMILY MEDICINE

## 2019-07-17 RX ORDER — QUETIAPINE FUMARATE 50 MG/1
50 TABLET, FILM COATED ORAL NIGHTLY
Qty: 90 TABLET | Refills: 3 | Status: SHIPPED | OUTPATIENT
Start: 2019-07-17 | End: 2020-01-01 | Stop reason: SDUPTHER

## 2019-07-17 RX ORDER — QUETIAPINE FUMARATE 25 MG/1
25 TABLET, FILM COATED ORAL NIGHTLY
Qty: 90 TABLET | Refills: 3 | Status: SHIPPED | OUTPATIENT
Start: 2019-07-17 | End: 2019-07-17

## 2019-07-17 RX ORDER — DONEPEZIL HYDROCHLORIDE 10 MG/1
10 TABLET, FILM COATED ORAL NIGHTLY
Qty: 90 TABLET | Refills: 3 | Status: SHIPPED | OUTPATIENT
Start: 2019-07-17 | End: 2020-01-01 | Stop reason: SDUPTHER

## 2019-07-17 ASSESSMENT — ENCOUNTER SYMPTOMS
BLOOD IN STOOL: 0
BACK PAIN: 0
SORE THROAT: 0
ABDOMINAL PAIN: 0
COUGH: 0
NAUSEA: 0
WHEEZING: 0
DIARRHEA: 0
RHINORRHEA: 0
VOMITING: 0
CHEST TIGHTNESS: 0
CONSTIPATION: 0
SHORTNESS OF BREATH: 0
SINUS PRESSURE: 0

## 2019-07-17 NOTE — PROGRESS NOTES
arthralgias, back pain and joint swelling. Skin: Positive for wound. Negative for rash. Lesion on left nose which is not healing   Neurological: Negative for weakness, numbness and headaches. Very forgetful, gets lost   Psychiatric/Behavioral: Positive for behavioral problems and hallucinations. Sees people other people don't see. Keeps thinking she's seen her parents trina have been gone  Many years. Prior to Visit Medications    Medication Sig Taking? Authorizing Provider   donepezil (ARICEPT) 5 MG tablet Take 1 tablet by mouth nightly Yes Rigoberto Costello MD   QUEtiapine (SEROQUEL) 25 MG tablet Take 1 tablet by mouth nightly Yes Rigoberto Costello MD        Social History     Tobacco Use    Smoking status: Never Smoker    Smokeless tobacco: Never Used   Substance Use Topics    Alcohol use: No        Vitals:    07/17/19 1519   BP: (!) 160/86   Pulse: 71   Resp: 16   SpO2: 97%   Weight: 107 lb 9.6 oz (48.8 kg)     Estimated body mass index is 19.68 kg/m² as calculated from the following:    Height as of 5/17/19: 5' 2\" (1.575 m). Weight as of this encounter: 107 lb 9.6 oz (48.8 kg). Physical Exam   Constitutional: She is oriented to person, place, and time. She appears well-developed and well-nourished. HENT:   Head: Normocephalic and atraumatic. Right Ear: External ear normal.   Left Ear: External ear normal.   Nose: Nose normal.   Mouth/Throat: Oropharynx is clear and moist.   Eyes: Pupils are equal, round, and reactive to light. Conjunctivae and EOM are normal.   Neck: Normal range of motion. Neck supple. No JVD present. No tracheal deviation present. No thyromegaly present. Cardiovascular: Normal rate, regular rhythm, normal heart sounds and intact distal pulses. Pulmonary/Chest: Effort normal and breath sounds normal. She has no wheezes. She has no rales. Abdominal: Soft. Bowel sounds are normal. She exhibits no mass. Musculoskeletal: Normal range of motion.  She

## 2019-08-28 ENCOUNTER — OFFICE VISIT (OUTPATIENT)
Dept: FAMILY MEDICINE CLINIC | Age: 84
End: 2019-08-28
Payer: MEDICARE

## 2019-08-28 VITALS
HEART RATE: 70 BPM | SYSTOLIC BLOOD PRESSURE: 136 MMHG | DIASTOLIC BLOOD PRESSURE: 82 MMHG | BODY MASS INDEX: 19.39 KG/M2 | WEIGHT: 106 LBS | RESPIRATION RATE: 18 BRPM

## 2019-08-28 DIAGNOSIS — I10 ESSENTIAL HYPERTENSION: ICD-10-CM

## 2019-08-28 DIAGNOSIS — D49.2 ABNORMAL SKIN GROWTH: ICD-10-CM

## 2019-08-28 DIAGNOSIS — F03.92 HALLUCINATIONS DUE TO LATE ONSET DEMENTIA: Primary | ICD-10-CM

## 2019-08-28 DIAGNOSIS — F33.41 RECURRENT MAJOR DEPRESSIVE DISORDER, IN PARTIAL REMISSION (HCC): ICD-10-CM

## 2019-08-28 PROCEDURE — 1123F ACP DISCUSS/DSCN MKR DOCD: CPT | Performed by: FAMILY MEDICINE

## 2019-08-28 PROCEDURE — 4040F PNEUMOC VAC/ADMIN/RCVD: CPT | Performed by: FAMILY MEDICINE

## 2019-08-28 PROCEDURE — G8420 CALC BMI NORM PARAMETERS: HCPCS | Performed by: FAMILY MEDICINE

## 2019-08-28 PROCEDURE — G8427 DOCREV CUR MEDS BY ELIG CLIN: HCPCS | Performed by: FAMILY MEDICINE

## 2019-08-28 PROCEDURE — 99214 OFFICE O/P EST MOD 30 MIN: CPT | Performed by: FAMILY MEDICINE

## 2019-08-28 PROCEDURE — 1036F TOBACCO NON-USER: CPT | Performed by: FAMILY MEDICINE

## 2019-08-28 PROCEDURE — 1090F PRES/ABSN URINE INCON ASSESS: CPT | Performed by: FAMILY MEDICINE

## 2019-08-28 PROCEDURE — G8399 PT W/DXA RESULTS DOCUMENT: HCPCS | Performed by: FAMILY MEDICINE

## 2019-08-28 ASSESSMENT — ENCOUNTER SYMPTOMS
BACK PAIN: 0
WHEEZING: 0
DIARRHEA: 0
NAUSEA: 0
BLOOD IN STOOL: 0
SHORTNESS OF BREATH: 0
COUGH: 0
CHEST TIGHTNESS: 0
SINUS PRESSURE: 0
CONSTIPATION: 0
VOMITING: 0
ABDOMINAL PAIN: 0
SORE THROAT: 0
RHINORRHEA: 0

## 2019-09-10 ENCOUNTER — TELEPHONE (OUTPATIENT)
Dept: FAMILY MEDICINE CLINIC | Age: 84
End: 2019-09-10

## 2019-09-10 DIAGNOSIS — C44.90 SKIN CANCER: Primary | ICD-10-CM

## 2020-01-01 ENCOUNTER — APPOINTMENT (OUTPATIENT)
Dept: GENERAL RADIOLOGY | Age: 85
DRG: 481 | End: 2020-01-01
Payer: MEDICARE

## 2020-01-01 ENCOUNTER — OFFICE VISIT (OUTPATIENT)
Dept: FAMILY MEDICINE CLINIC | Age: 85
End: 2020-01-01
Payer: MEDICARE

## 2020-01-01 ENCOUNTER — APPOINTMENT (OUTPATIENT)
Dept: GENERAL RADIOLOGY | Age: 85
End: 2020-01-01
Payer: MEDICARE

## 2020-01-01 ENCOUNTER — HOSPITAL ENCOUNTER (INPATIENT)
Age: 85
LOS: 5 days | Discharge: NURSING, REHAB OR LONG-TERM CARE | DRG: 481 | End: 2020-08-15
Attending: EMERGENCY MEDICINE | Admitting: FAMILY MEDICINE
Payer: MEDICARE

## 2020-01-01 ENCOUNTER — HOSPITAL ENCOUNTER (OUTPATIENT)
Dept: CT IMAGING | Age: 85
Discharge: HOME | End: 2020-05-05
Payer: MEDICARE

## 2020-01-01 ENCOUNTER — HOSPITAL ENCOUNTER (OUTPATIENT)
Age: 85
Setting detail: SPECIMEN
Discharge: HOME | End: 2020-05-27
Payer: MEDICARE

## 2020-01-01 ENCOUNTER — TELEPHONE (OUTPATIENT)
Dept: FAMILY MEDICINE CLINIC | Age: 85
End: 2020-01-01

## 2020-01-01 ENCOUNTER — ANESTHESIA EVENT (OUTPATIENT)
Dept: OPERATING ROOM | Age: 85
DRG: 481 | End: 2020-01-01
Payer: MEDICARE

## 2020-01-01 ENCOUNTER — HOSPITAL ENCOUNTER (EMERGENCY)
Age: 85
Discharge: ANOTHER HOSPITAL | End: 2020-05-10
Attending: EMERGENCY MEDICINE
Payer: MEDICARE

## 2020-01-01 ENCOUNTER — HOSPITAL ENCOUNTER (EMERGENCY)
Age: 85
Discharge: ANOTHER HOSPITAL | End: 2020-06-06
Attending: EMERGENCY MEDICINE
Payer: MEDICARE

## 2020-01-01 ENCOUNTER — HOSPITAL ENCOUNTER (INPATIENT)
Age: 85
LOS: 5 days | Discharge: NURSING, REHAB OR LONG-TERM CARE | DRG: 441 | End: 2020-07-30
Attending: INTERNAL MEDICINE | Admitting: INTERNAL MEDICINE
Payer: MEDICARE

## 2020-01-01 ENCOUNTER — APPOINTMENT (OUTPATIENT)
Dept: INTERVENTIONAL RADIOLOGY/VASCULAR | Age: 85
DRG: 682 | End: 2020-01-01
Attending: INTERNAL MEDICINE
Payer: MEDICARE

## 2020-01-01 ENCOUNTER — CARE COORDINATION (OUTPATIENT)
Dept: CASE MANAGEMENT | Age: 85
End: 2020-01-01

## 2020-01-01 ENCOUNTER — APPOINTMENT (OUTPATIENT)
Dept: GENERAL RADIOLOGY | Age: 85
DRG: 682 | End: 2020-01-01
Attending: INTERNAL MEDICINE
Payer: MEDICARE

## 2020-01-01 ENCOUNTER — HOSPITAL ENCOUNTER (OUTPATIENT)
Dept: INTERVENTIONAL RADIOLOGY/VASCULAR | Age: 85
Discharge: HOME | End: 2020-07-14
Payer: MEDICARE

## 2020-01-01 ENCOUNTER — APPOINTMENT (OUTPATIENT)
Dept: CT IMAGING | Age: 85
End: 2020-01-01
Payer: MEDICARE

## 2020-01-01 ENCOUNTER — HOSPITAL ENCOUNTER (INPATIENT)
Age: 85
LOS: 5 days | Discharge: HOME | DRG: 682 | End: 2020-06-11
Attending: INTERNAL MEDICINE | Admitting: INTERNAL MEDICINE
Payer: MEDICARE

## 2020-01-01 ENCOUNTER — APPOINTMENT (OUTPATIENT)
Dept: ULTRASOUND IMAGING | Age: 85
DRG: 441 | End: 2020-01-01
Attending: INTERNAL MEDICINE
Payer: MEDICARE

## 2020-01-01 ENCOUNTER — HOSPITAL ENCOUNTER (INPATIENT)
Age: 85
LOS: 5 days | Discharge: HOME HEALTH | DRG: 433 | End: 2020-05-15
Attending: INTERNAL MEDICINE | Admitting: INTERNAL MEDICINE
Payer: MEDICARE

## 2020-01-01 ENCOUNTER — HOSPITAL ENCOUNTER (OUTPATIENT)
Age: 85
Setting detail: SPECIMEN
Discharge: HOME | End: 2020-07-01
Payer: MEDICARE

## 2020-01-01 ENCOUNTER — APPOINTMENT (OUTPATIENT)
Dept: INTERVENTIONAL RADIOLOGY/VASCULAR | Age: 85
DRG: 433 | End: 2020-01-01
Attending: INTERNAL MEDICINE
Payer: MEDICARE

## 2020-01-01 ENCOUNTER — HOSPITAL ENCOUNTER (OUTPATIENT)
Dept: INTERVENTIONAL RADIOLOGY/VASCULAR | Age: 85
Discharge: HOME | End: 2020-09-16
Payer: MEDICARE

## 2020-01-01 ENCOUNTER — ANESTHESIA (OUTPATIENT)
Dept: OPERATING ROOM | Age: 85
DRG: 481 | End: 2020-01-01
Payer: MEDICARE

## 2020-01-01 ENCOUNTER — APPOINTMENT (OUTPATIENT)
Dept: CT IMAGING | Age: 85
DRG: 481 | End: 2020-01-01
Payer: MEDICARE

## 2020-01-01 ENCOUNTER — HOSPITAL ENCOUNTER (OUTPATIENT)
Age: 85
Discharge: HOME | End: 2020-03-28
Payer: MEDICARE

## 2020-01-01 ENCOUNTER — HOSPITAL ENCOUNTER (OUTPATIENT)
Age: 85
Setting detail: SPECIMEN
Discharge: HOME | End: 2020-06-18
Payer: MEDICARE

## 2020-01-01 ENCOUNTER — APPOINTMENT (OUTPATIENT)
Dept: INTERVENTIONAL RADIOLOGY/VASCULAR | Age: 85
DRG: 441 | End: 2020-01-01
Attending: INTERNAL MEDICINE
Payer: MEDICARE

## 2020-01-01 ENCOUNTER — HOSPITAL ENCOUNTER (EMERGENCY)
Age: 85
Discharge: ANOTHER HOSPITAL | End: 2020-07-25
Attending: EMERGENCY MEDICINE
Payer: MEDICARE

## 2020-01-01 ENCOUNTER — HOSPITAL ENCOUNTER (OUTPATIENT)
Dept: GENERAL RADIOLOGY | Age: 85
Discharge: HOME | End: 2020-03-28
Payer: MEDICARE

## 2020-01-01 VITALS
OXYGEN SATURATION: 95 % | SYSTOLIC BLOOD PRESSURE: 123 MMHG | HEART RATE: 68 BPM | BODY MASS INDEX: 20.62 KG/M2 | HEIGHT: 60 IN | RESPIRATION RATE: 19 BRPM | DIASTOLIC BLOOD PRESSURE: 59 MMHG | TEMPERATURE: 97.9 F | WEIGHT: 105 LBS

## 2020-01-01 VITALS
BODY MASS INDEX: 18.77 KG/M2 | RESPIRATION RATE: 16 BRPM | SYSTOLIC BLOOD PRESSURE: 150 MMHG | DIASTOLIC BLOOD PRESSURE: 108 MMHG | WEIGHT: 102 LBS | OXYGEN SATURATION: 97 % | HEART RATE: 106 BPM | TEMPERATURE: 97.6 F | HEIGHT: 62 IN

## 2020-01-01 VITALS
DIASTOLIC BLOOD PRESSURE: 62 MMHG | OXYGEN SATURATION: 99 % | HEIGHT: 62 IN | WEIGHT: 102 LBS | SYSTOLIC BLOOD PRESSURE: 137 MMHG | BODY MASS INDEX: 18.77 KG/M2 | RESPIRATION RATE: 16 BRPM | TEMPERATURE: 97.9 F | HEART RATE: 67 BPM

## 2020-01-01 VITALS
HEIGHT: 60 IN | TEMPERATURE: 98.3 F | RESPIRATION RATE: 17 BRPM | DIASTOLIC BLOOD PRESSURE: 69 MMHG | WEIGHT: 99.7 LBS | OXYGEN SATURATION: 94 % | BODY MASS INDEX: 19.57 KG/M2 | SYSTOLIC BLOOD PRESSURE: 147 MMHG | HEART RATE: 60 BPM

## 2020-01-01 VITALS
WEIGHT: 120 LBS | SYSTOLIC BLOOD PRESSURE: 165 MMHG | OXYGEN SATURATION: 94 % | TEMPERATURE: 97.9 F | HEIGHT: 61 IN | DIASTOLIC BLOOD PRESSURE: 84 MMHG | HEART RATE: 67 BPM | RESPIRATION RATE: 14 BRPM | BODY MASS INDEX: 22.66 KG/M2

## 2020-01-01 VITALS
SYSTOLIC BLOOD PRESSURE: 149 MMHG | DIASTOLIC BLOOD PRESSURE: 71 MMHG | RESPIRATION RATE: 16 BRPM | TEMPERATURE: 96.7 F | WEIGHT: 107 LBS | OXYGEN SATURATION: 99 % | BODY MASS INDEX: 19.69 KG/M2 | HEIGHT: 62 IN

## 2020-01-01 VITALS
RESPIRATION RATE: 4 BRPM | DIASTOLIC BLOOD PRESSURE: 64 MMHG | SYSTOLIC BLOOD PRESSURE: 125 MMHG | TEMPERATURE: 92.8 F | OXYGEN SATURATION: 100 %

## 2020-01-01 VITALS
HEART RATE: 87 BPM | DIASTOLIC BLOOD PRESSURE: 66 MMHG | RESPIRATION RATE: 20 BRPM | WEIGHT: 99.6 LBS | SYSTOLIC BLOOD PRESSURE: 112 MMHG | BODY MASS INDEX: 19.45 KG/M2 | OXYGEN SATURATION: 100 % | TEMPERATURE: 97.2 F

## 2020-01-01 VITALS
DIASTOLIC BLOOD PRESSURE: 64 MMHG | WEIGHT: 76.4 LBS | HEIGHT: 62 IN | OXYGEN SATURATION: 97 % | TEMPERATURE: 97.8 F | RESPIRATION RATE: 16 BRPM | SYSTOLIC BLOOD PRESSURE: 132 MMHG | HEART RATE: 66 BPM | BODY MASS INDEX: 14.06 KG/M2

## 2020-01-01 VITALS
BODY MASS INDEX: 21.03 KG/M2 | DIASTOLIC BLOOD PRESSURE: 60 MMHG | HEART RATE: 60 BPM | OXYGEN SATURATION: 95 % | TEMPERATURE: 96.7 F | RESPIRATION RATE: 14 BRPM | WEIGHT: 115 LBS | SYSTOLIC BLOOD PRESSURE: 90 MMHG

## 2020-01-01 VITALS
TEMPERATURE: 97.5 F | SYSTOLIC BLOOD PRESSURE: 178 MMHG | WEIGHT: 116.6 LBS | DIASTOLIC BLOOD PRESSURE: 77 MMHG | OXYGEN SATURATION: 98 % | HEART RATE: 60 BPM | BODY MASS INDEX: 21.46 KG/M2 | HEIGHT: 62 IN | RESPIRATION RATE: 18 BRPM

## 2020-01-01 VITALS
HEART RATE: 63 BPM | RESPIRATION RATE: 8 BRPM | BODY MASS INDEX: 17.12 KG/M2 | OXYGEN SATURATION: 100 % | TEMPERATURE: 97.6 F | DIASTOLIC BLOOD PRESSURE: 69 MMHG | WEIGHT: 93.6 LBS | SYSTOLIC BLOOD PRESSURE: 133 MMHG

## 2020-01-01 VITALS
HEART RATE: 60 BPM | RESPIRATION RATE: 16 BRPM | SYSTOLIC BLOOD PRESSURE: 168 MMHG | DIASTOLIC BLOOD PRESSURE: 70 MMHG | OXYGEN SATURATION: 98 % | TEMPERATURE: 97.2 F | WEIGHT: 110.13 LBS | HEIGHT: 62 IN | BODY MASS INDEX: 20.26 KG/M2

## 2020-01-01 DIAGNOSIS — K74.60 CIRRHOSIS OF LIVER WITH ASCITES, UNSPECIFIED HEPATIC CIRRHOSIS TYPE (HCC): ICD-10-CM

## 2020-01-01 DIAGNOSIS — K74.60 CIRRHOSIS OF LIVER WITH ASCITES, UNSPECIFIED HEPATIC CIRRHOSIS TYPE (HCC): Primary | ICD-10-CM

## 2020-01-01 DIAGNOSIS — G30.1 LATE ONSET ALZHEIMER'S DISEASE WITH BEHAVIORAL DISTURBANCE (HCC): ICD-10-CM

## 2020-01-01 DIAGNOSIS — R18.8 CIRRHOSIS OF LIVER WITH ASCITES, UNSPECIFIED HEPATIC CIRRHOSIS TYPE (HCC): Primary | ICD-10-CM

## 2020-01-01 DIAGNOSIS — R18.8 CIRRHOSIS OF LIVER WITH ASCITES, UNSPECIFIED HEPATIC CIRRHOSIS TYPE (HCC): ICD-10-CM

## 2020-01-01 DIAGNOSIS — F02.818 LATE ONSET ALZHEIMER'S DISEASE WITH BEHAVIORAL DISTURBANCE (HCC): ICD-10-CM

## 2020-01-01 DIAGNOSIS — R18.8 OTHER ASCITES: ICD-10-CM

## 2020-01-01 LAB
A/G RATIO: 0.5 (ref 1.1–2.2)
ABO/RH: NORMAL
ALBUMIN ELP: 2.3 GM/DL (ref 3.2–5.6)
ALBUMIN FLUID: 0.6 GM/DL
ALBUMIN FLUID: 0.8 GM/DL
ALBUMIN FLUID: 0.9 GM/DL
ALBUMIN SERPL-MCNC: 2.2 GM/DL (ref 3.4–5)
ALBUMIN SERPL-MCNC: 2.2 GM/DL (ref 3.4–5)
ALBUMIN SERPL-MCNC: 2.3 GM/DL (ref 3.4–5)
ALBUMIN SERPL-MCNC: 2.4 G/DL (ref 3.4–5)
ALBUMIN SERPL-MCNC: 2.5 GM/DL (ref 3.4–5)
ALBUMIN SERPL-MCNC: 2.6 GM/DL (ref 3.4–5)
ALBUMIN SERPL-MCNC: 2.7 GM/DL (ref 3.4–5)
ALBUMIN SERPL-MCNC: 2.9 GM/DL (ref 3.4–5)
ALBUMIN SERPL-MCNC: 2.9 GM/DL (ref 3.4–5)
ALBUMIN SERPL-MCNC: 3.1 GM/DL (ref 3.4–5)
ALBUMIN SERPL-MCNC: 3.2 GM/DL (ref 3.4–5)
ALCOHOL SCREEN SERUM: <0.01 %WT/VOL
ALCOHOL SCREEN SERUM: <0.01 %WT/VOL
ALP BLD-CCNC: 102 IU/L (ref 40–129)
ALP BLD-CCNC: 124 IU/L (ref 40–129)
ALP BLD-CCNC: 62 IU/L (ref 40–128)
ALP BLD-CCNC: 63 IU/L (ref 40–128)
ALP BLD-CCNC: 63 IU/L (ref 40–128)
ALP BLD-CCNC: 67 IU/L (ref 40–128)
ALP BLD-CCNC: 69 IU/L (ref 40–128)
ALP BLD-CCNC: 81 IU/L (ref 40–129)
ALP BLD-CCNC: 82 IU/L (ref 40–129)
ALP BLD-CCNC: 84 IU/L (ref 40–129)
ALP BLD-CCNC: 84 IU/L (ref 40–129)
ALP BLD-CCNC: 86 IU/L (ref 40–129)
ALP BLD-CCNC: 89 U/L (ref 40–129)
ALP BLD-CCNC: 90 IU/L (ref 40–129)
ALP BLD-CCNC: 93 IU/L (ref 40–129)
ALP BLD-CCNC: 95 IU/L (ref 40–128)
ALPHA-1-GLOBULIN: 0.1 GM/DL (ref 0.1–0.4)
ALPHA-2-GLOBULIN: 0.6 GM/DL (ref 0.4–1.2)
ALT SERPL-CCNC: 10 U/L (ref 10–40)
ALT SERPL-CCNC: 10 U/L (ref 10–40)
ALT SERPL-CCNC: 12 U/L (ref 10–40)
ALT SERPL-CCNC: 13 U/L (ref 10–40)
ALT SERPL-CCNC: 14 U/L (ref 10–40)
ALT SERPL-CCNC: 15 U/L (ref 10–40)
ALT SERPL-CCNC: 15 U/L (ref 10–40)
ALT SERPL-CCNC: 17 U/L (ref 10–40)
ALT SERPL-CCNC: 18 U/L (ref 10–40)
ALT SERPL-CCNC: 19 U/L (ref 10–40)
ALT SERPL-CCNC: 21 U/L (ref 10–40)
ALT SERPL-CCNC: 23 U/L (ref 10–40)
ALT SERPL-CCNC: 25 U/L (ref 10–40)
ALT SERPL-CCNC: 38 U/L (ref 10–40)
AMMONIA: 19 UMOL/L (ref 11–51)
AMMONIA: 24 UMOL/L (ref 11–51)
AMMONIA: 40 UMOL/L (ref 11–51)
AMMONIA: 47 UMOL/L (ref 11–51)
AMMONIA: 84 UMOL/L (ref 11–51)
AMMONIA: 86 UMOL/L (ref 11–51)
AMPHETAMINES: NEGATIVE
AMYLASE FLUID: 16 U/L
AMYLASE FLUID: 32 U/L
AMYLASE: 28 U/L (ref 25–115)
AMYLASE: 36 U/L (ref 25–115)
ANION GAP SERPL CALCULATED.3IONS-SCNC: 10 MMOL/L (ref 4–16)
ANION GAP SERPL CALCULATED.3IONS-SCNC: 11 MMOL/L (ref 4–16)
ANION GAP SERPL CALCULATED.3IONS-SCNC: 11 MMOL/L (ref 4–16)
ANION GAP SERPL CALCULATED.3IONS-SCNC: 12 MMOL/L (ref 4–16)
ANION GAP SERPL CALCULATED.3IONS-SCNC: 19 MMOL/L (ref 4–16)
ANION GAP SERPL CALCULATED.3IONS-SCNC: 6 MMOL/L (ref 4–16)
ANION GAP SERPL CALCULATED.3IONS-SCNC: 7 MMOL/L (ref 4–16)
ANION GAP SERPL CALCULATED.3IONS-SCNC: 8 MMOL/L (ref 3–16)
ANION GAP SERPL CALCULATED.3IONS-SCNC: 8 MMOL/L (ref 4–16)
ANION GAP SERPL CALCULATED.3IONS-SCNC: 9 MMOL/L (ref 4–16)
ANTI-MITOCHON TITER: 3.2 UNITS (ref 0–20)
ANTI-NUCLEAR ANTIBODY (ANA): NORMAL
ANTIBODY SCREEN: NEGATIVE
APTT: 31.6 SECONDS (ref 25.1–37.1)
APTT: 41.4 SECONDS (ref 25.1–37.1)
APTT: 44.1 SECONDS (ref 25.1–37.1)
APTT: 56.5 SECONDS (ref 25.1–37.1)
AST SERPL-CCNC: 22 IU/L (ref 15–37)
AST SERPL-CCNC: 23 IU/L (ref 15–37)
AST SERPL-CCNC: 24 IU/L (ref 15–37)
AST SERPL-CCNC: 25 IU/L (ref 15–37)
AST SERPL-CCNC: 26 IU/L (ref 15–37)
AST SERPL-CCNC: 28 IU/L (ref 15–37)
AST SERPL-CCNC: 31 IU/L (ref 15–37)
AST SERPL-CCNC: 33 U/L (ref 15–37)
AST SERPL-CCNC: 35 IU/L (ref 15–37)
AST SERPL-CCNC: 37 IU/L (ref 15–37)
AST SERPL-CCNC: 41 IU/L (ref 15–37)
AST SERPL-CCNC: 42 IU/L (ref 15–37)
AST SERPL-CCNC: 42 IU/L (ref 15–37)
AST SERPL-CCNC: 45 IU/L (ref 15–37)
AST SERPL-CCNC: 47 IU/L (ref 15–37)
AST SERPL-CCNC: 63 IU/L (ref 15–37)
BACTERIA: ABNORMAL /HPF
BACTERIA: NEGATIVE /HPF
BARBITURATE SCREEN URINE: NEGATIVE
BASOPHILS ABSOLUTE: 0 K/CU MM
BASOPHILS ABSOLUTE: 0.1 K/CU MM
BASOPHILS ABSOLUTE: 0.1 K/UL (ref 0–0.2)
BASOPHILS RELATIVE PERCENT: 0.1 % (ref 0–1)
BASOPHILS RELATIVE PERCENT: 0.3 % (ref 0–1)
BASOPHILS RELATIVE PERCENT: 0.4 % (ref 0–1)
BASOPHILS RELATIVE PERCENT: 0.4 % (ref 0–1)
BASOPHILS RELATIVE PERCENT: 0.5 % (ref 0–1)
BASOPHILS RELATIVE PERCENT: 0.8 % (ref 0–1)
BASOPHILS RELATIVE PERCENT: 0.9 % (ref 0–1)
BASOPHILS RELATIVE PERCENT: 1 %
BENZODIAZEPINE SCREEN, URINE: NEGATIVE
BETA GLOBULIN: 1.3 GM/DL (ref 0.5–1.3)
BILIRUB SERPL-MCNC: 0.4 MG/DL (ref 0–1)
BILIRUB SERPL-MCNC: 0.5 MG/DL (ref 0–1)
BILIRUB SERPL-MCNC: 0.6 MG/DL (ref 0–1)
BILIRUB SERPL-MCNC: 0.7 MG/DL (ref 0–1)
BILIRUB SERPL-MCNC: 0.8 MG/DL (ref 0–1)
BILIRUB SERPL-MCNC: 0.9 MG/DL (ref 0–1)
BILIRUB SERPL-MCNC: 1 MG/DL (ref 0–1)
BILIRUBIN URINE: ABNORMAL MG/DL
BILIRUBIN URINE: NEGATIVE MG/DL
BLOOD, URINE: ABNORMAL
BLOOD, URINE: NEGATIVE
BUN BLDV-MCNC: 12 MG/DL (ref 7–20)
BUN BLDV-MCNC: 13 MG/DL (ref 6–23)
BUN BLDV-MCNC: 16 MG/DL (ref 6–23)
BUN BLDV-MCNC: 16 MG/DL (ref 6–23)
BUN BLDV-MCNC: 17 MG/DL (ref 6–23)
BUN BLDV-MCNC: 18 MG/DL (ref 6–23)
BUN BLDV-MCNC: 19 MG/DL (ref 6–23)
BUN BLDV-MCNC: 23 MG/DL (ref 6–23)
BUN BLDV-MCNC: 24 MG/DL (ref 6–23)
BUN BLDV-MCNC: 25 MG/DL (ref 6–23)
BUN BLDV-MCNC: 28 MG/DL (ref 6–23)
BUN BLDV-MCNC: 29 MG/DL (ref 6–23)
BUN BLDV-MCNC: 30 MG/DL (ref 6–23)
BUN BLDV-MCNC: 32 MG/DL (ref 6–23)
BUN BLDV-MCNC: 41 MG/DL (ref 6–23)
BUN BLDV-MCNC: 50 MG/DL (ref 6–23)
CALCIUM OXALATE CRYSTALS: ABNORMAL /HPF
CALCIUM SERPL-MCNC: 7.3 MG/DL (ref 8.3–10.6)
CALCIUM SERPL-MCNC: 8.1 MG/DL (ref 8.3–10.6)
CALCIUM SERPL-MCNC: 8.2 MG/DL (ref 8.3–10.6)
CALCIUM SERPL-MCNC: 8.3 MG/DL (ref 8.3–10.6)
CALCIUM SERPL-MCNC: 8.4 MG/DL (ref 8.3–10.6)
CALCIUM SERPL-MCNC: 8.5 MG/DL (ref 8.3–10.6)
CALCIUM SERPL-MCNC: 8.5 MG/DL (ref 8.3–10.6)
CALCIUM SERPL-MCNC: 8.7 MG/DL (ref 8.3–10.6)
CALCIUM SERPL-MCNC: 8.7 MG/DL (ref 8.3–10.6)
CALCIUM SERPL-MCNC: 8.9 MG/DL (ref 8.3–10.6)
CALCIUM SERPL-MCNC: 9 MG/DL (ref 8.3–10.6)
CALCIUM SERPL-MCNC: 9.2 MG/DL (ref 8.3–10.6)
CALCIUM SERPL-MCNC: 9.3 MG/DL (ref 8.3–10.6)
CALCIUM SERPL-MCNC: 9.4 MG/DL (ref 8.3–10.6)
CALCIUM SERPL-MCNC: 9.5 MG/DL (ref 8.3–10.6)
CANNABINOID SCREEN URINE: NEGATIVE
CAST TYPE: ABNORMAL /HPF
CAST TYPE: NEGATIVE /HPF
CAST TYPE: NEGATIVE /HPF
CHLORIDE BLD-SCNC: 100 MMOL/L (ref 99–110)
CHLORIDE BLD-SCNC: 100 MMOL/L (ref 99–110)
CHLORIDE BLD-SCNC: 101 MMOL/L (ref 99–110)
CHLORIDE BLD-SCNC: 102 MMOL/L (ref 99–110)
CHLORIDE BLD-SCNC: 102 MMOL/L (ref 99–110)
CHLORIDE BLD-SCNC: 103 MMOL/L (ref 99–110)
CHLORIDE BLD-SCNC: 103 MMOL/L (ref 99–110)
CHLORIDE BLD-SCNC: 105 MMOL/L (ref 99–110)
CHLORIDE BLD-SCNC: 105 MMOL/L (ref 99–110)
CHLORIDE BLD-SCNC: 106 MMOL/L (ref 99–110)
CHLORIDE BLD-SCNC: 107 MMOL/L (ref 99–110)
CHLORIDE BLD-SCNC: 108 MMOL/L (ref 99–110)
CHLORIDE BLD-SCNC: 108 MMOL/L (ref 99–110)
CHLORIDE BLD-SCNC: 109 MMOL/L (ref 99–110)
CHLORIDE BLD-SCNC: 111 MMOL/L (ref 99–110)
CHLORIDE BLD-SCNC: 98 MMOL/L (ref 99–110)
CHLORIDE BLD-SCNC: 99 MMOL/L (ref 99–110)
CHLORIDE BLD-SCNC: 99 MMOL/L (ref 99–110)
CHP ED QC CHECK: YES
CLARITY: ABNORMAL
CLARITY: CLEAR
CO2: 20 MMOL/L (ref 21–32)
CO2: 21 MMOL/L (ref 21–32)
CO2: 23 MMOL/L (ref 21–32)
CO2: 23 MMOL/L (ref 21–32)
CO2: 24 MMOL/L (ref 21–32)
CO2: 25 MMOL/L (ref 21–32)
CO2: 26 MMOL/L (ref 21–32)
CO2: 27 MMOL/L (ref 21–32)
CO2: 28 MMOL/L (ref 21–32)
CO2: 29 MMOL/L (ref 21–32)
CO2: 30 MMOL/L (ref 21–32)
CO2: 31 MMOL/L (ref 21–32)
CO2: 32 MMOL/L (ref 21–32)
CO2: 34 MMOL/L (ref 21–32)
COCAINE METABOLITE: NEGATIVE
COLOR: YELLOW
COMPONENT: NORMAL
CREAT SERPL-MCNC: 0.9 MG/DL (ref 0.6–1.1)
CREAT SERPL-MCNC: 1 MG/DL (ref 0.6–1.1)
CREAT SERPL-MCNC: 1 MG/DL (ref 0.6–1.1)
CREAT SERPL-MCNC: 1.1 MG/DL (ref 0.6–1.1)
CREAT SERPL-MCNC: 1.1 MG/DL (ref 0.6–1.1)
CREAT SERPL-MCNC: 1.1 MG/DL (ref 0.6–1.2)
CREAT SERPL-MCNC: 1.2 MG/DL (ref 0.6–1.1)
CREAT SERPL-MCNC: 1.2 MG/DL (ref 0.6–1.1)
CREAT SERPL-MCNC: 1.3 MG/DL (ref 0.6–1.1)
CREAT SERPL-MCNC: 1.4 MG/DL (ref 0.6–1.1)
CREAT SERPL-MCNC: 1.5 MG/DL (ref 0.6–1.1)
CREAT SERPL-MCNC: 1.5 MG/DL (ref 0.6–1.1)
CREAT SERPL-MCNC: 1.7 MG/DL (ref 0.6–1.1)
CREAT SERPL-MCNC: 1.7 MG/DL (ref 0.6–1.1)
CREAT SERPL-MCNC: 2.6 MG/DL (ref 0.6–1.1)
CREAT SERPL-MCNC: 3.2 MG/DL (ref 0.6–1.1)
CREAT SERPL-MCNC: 3.2 MG/DL (ref 0.6–1.1)
CREAT SERPL-MCNC: 3.3 MG/DL (ref 0.6–1.1)
CREATININE URINE: 150.3 MG/DL (ref 28–217)
CREATININE URINE: 40.8 MG/DL (ref 28–217)
CREATININE URINE: 51.9 MG/DL (ref 28–217)
CROSSMATCH RESULT: NORMAL
CRYSTAL TYPE: ABNORMAL /HPF
CRYSTAL TYPE: NEGATIVE /HPF
CRYSTAL TYPE: NEGATIVE /HPF
CULTURE: ABNORMAL
CULTURE: NORMAL
DIFFERENTIAL TYPE: ABNORMAL
EKG ATRIAL RATE: 107 BPM
EKG ATRIAL RATE: 60 BPM
EKG ATRIAL RATE: 63 BPM
EKG ATRIAL RATE: 81 BPM
EKG DIAGNOSIS: NORMAL
EKG P AXIS: 42 DEGREES
EKG P AXIS: 88 DEGREES
EKG P AXIS: 89 DEGREES
EKG P-R INTERVAL: 144 MS
EKG P-R INTERVAL: 172 MS
EKG P-R INTERVAL: 186 MS
EKG P-R INTERVAL: 198 MS
EKG Q-T INTERVAL: 336 MS
EKG Q-T INTERVAL: 354 MS
EKG Q-T INTERVAL: 418 MS
EKG Q-T INTERVAL: 464 MS
EKG QRS DURATION: 102 MS
EKG QRS DURATION: 106 MS
EKG QRS DURATION: 90 MS
EKG QRS DURATION: 96 MS
EKG QTC CALCULATION (BAZETT): 390 MS
EKG QTC CALCULATION (BAZETT): 427 MS
EKG QTC CALCULATION (BAZETT): 464 MS
EKG QTC CALCULATION (BAZETT): 472 MS
EKG R AXIS: -32 DEGREES
EKG R AXIS: -34 DEGREES
EKG R AXIS: -39 DEGREES
EKG R AXIS: -46 DEGREES
EKG T AXIS: -13 DEGREES
EKG T AXIS: 147 DEGREES
EKG T AXIS: 157 DEGREES
EKG T AXIS: 43 DEGREES
EKG VENTRICULAR RATE: 107 BPM
EKG VENTRICULAR RATE: 60 BPM
EKG VENTRICULAR RATE: 63 BPM
EKG VENTRICULAR RATE: 81 BPM
EOSINOPHILS ABSOLUTE: 0 K/CU MM
EOSINOPHILS ABSOLUTE: 0.1 K/CU MM
EOSINOPHILS ABSOLUTE: 0.1 K/UL (ref 0–0.6)
EOSINOPHILS ABSOLUTE: 0.2 K/CU MM
EOSINOPHILS RELATIVE PERCENT: 0 % (ref 0–3)
EOSINOPHILS RELATIVE PERCENT: 0.2 % (ref 0–3)
EOSINOPHILS RELATIVE PERCENT: 0.3 % (ref 0–3)
EOSINOPHILS RELATIVE PERCENT: 0.4 % (ref 0–3)
EOSINOPHILS RELATIVE PERCENT: 0.5 % (ref 0–3)
EOSINOPHILS RELATIVE PERCENT: 0.5 % (ref 0–3)
EOSINOPHILS RELATIVE PERCENT: 0.7 % (ref 0–3)
EOSINOPHILS RELATIVE PERCENT: 0.8 % (ref 0–3)
EOSINOPHILS RELATIVE PERCENT: 0.9 % (ref 0–3)
EOSINOPHILS RELATIVE PERCENT: 1.1 % (ref 0–3)
EOSINOPHILS RELATIVE PERCENT: 1.3 % (ref 0–3)
EOSINOPHILS RELATIVE PERCENT: 1.4 % (ref 0–3)
EOSINOPHILS RELATIVE PERCENT: 1.5 %
EPITHELIAL CELLS, UA: ABNORMAL /HPF
F-ACTIN AB, IGG: 12 UNITS (ref 0–19)
FERRITIN: 277 NG/ML (ref 15–150)
FLUID TYPE: NORMAL INDEX
FOLATE: 4.9 NG/ML (ref 3.1–17.5)
GAMMA GLOBULIN: 2.6 GM/DL (ref 0.5–1.6)
GFR AFRICAN AMERICAN: 16 ML/MIN/1.73M2
GFR AFRICAN AMERICAN: 17 ML/MIN/1.73M2
GFR AFRICAN AMERICAN: 17 ML/MIN/1.73M2
GFR AFRICAN AMERICAN: 21 ML/MIN/1.73M2
GFR AFRICAN AMERICAN: 35 ML/MIN/1.73M2
GFR AFRICAN AMERICAN: 35 ML/MIN/1.73M2
GFR AFRICAN AMERICAN: 40 ML/MIN/1.73M2
GFR AFRICAN AMERICAN: 40 ML/MIN/1.73M2
GFR AFRICAN AMERICAN: 43 ML/MIN/1.73M2
GFR AFRICAN AMERICAN: 47 ML/MIN/1.73M2
GFR AFRICAN AMERICAN: 52 ML/MIN/1.73M2
GFR AFRICAN AMERICAN: 52 ML/MIN/1.73M2
GFR AFRICAN AMERICAN: 57
GFR AFRICAN AMERICAN: 57 ML/MIN/1.73M2
GFR AFRICAN AMERICAN: 57 ML/MIN/1.73M2
GFR AFRICAN AMERICAN: >60 ML/MIN/1.73M2
GFR NON-AFRICAN AMERICAN: 13 ML/MIN/1.73M2
GFR NON-AFRICAN AMERICAN: 14 ML/MIN/1.73M2
GFR NON-AFRICAN AMERICAN: 14 ML/MIN/1.73M2
GFR NON-AFRICAN AMERICAN: 17 ML/MIN/1.73M2
GFR NON-AFRICAN AMERICAN: 29 ML/MIN/1.73M2
GFR NON-AFRICAN AMERICAN: 29 ML/MIN/1.73M2
GFR NON-AFRICAN AMERICAN: 33 ML/MIN/1.73M2
GFR NON-AFRICAN AMERICAN: 33 ML/MIN/1.73M2
GFR NON-AFRICAN AMERICAN: 36 ML/MIN/1.73M2
GFR NON-AFRICAN AMERICAN: 39 ML/MIN/1.73M2
GFR NON-AFRICAN AMERICAN: 43 ML/MIN/1.73M2
GFR NON-AFRICAN AMERICAN: 43 ML/MIN/1.73M2
GFR NON-AFRICAN AMERICAN: 47
GFR NON-AFRICAN AMERICAN: 47 ML/MIN/1.73M2
GFR NON-AFRICAN AMERICAN: 47 ML/MIN/1.73M2
GFR NON-AFRICAN AMERICAN: 53 ML/MIN/1.73M2
GFR NON-AFRICAN AMERICAN: 53 ML/MIN/1.73M2
GFR NON-AFRICAN AMERICAN: 60 ML/MIN/1.73M2
GFR NON-AFRICAN AMERICAN: >60 ML/MIN/1.73M2
GLOBULIN: 4.4 G/DL
GLUCOSE BLD-MCNC: 101 MG/DL (ref 70–99)
GLUCOSE BLD-MCNC: 102 MG/DL (ref 70–99)
GLUCOSE BLD-MCNC: 105 MG/DL (ref 70–99)
GLUCOSE BLD-MCNC: 109 MG/DL (ref 70–99)
GLUCOSE BLD-MCNC: 110 MG/DL (ref 70–99)
GLUCOSE BLD-MCNC: 111 MG/DL (ref 70–99)
GLUCOSE BLD-MCNC: 112 MG/DL (ref 70–99)
GLUCOSE BLD-MCNC: 117 MG/DL (ref 70–99)
GLUCOSE BLD-MCNC: 117 MG/DL (ref 70–99)
GLUCOSE BLD-MCNC: 124 MG/DL (ref 70–99)
GLUCOSE BLD-MCNC: 137 MG/DL
GLUCOSE BLD-MCNC: 137 MG/DL (ref 70–99)
GLUCOSE BLD-MCNC: 140 MG/DL (ref 70–99)
GLUCOSE BLD-MCNC: 144 MG/DL (ref 70–99)
GLUCOSE BLD-MCNC: 153 MG/DL (ref 70–99)
GLUCOSE BLD-MCNC: 191 MG/DL (ref 70–99)
GLUCOSE BLD-MCNC: 228 MG/DL (ref 70–99)
GLUCOSE BLD-MCNC: 55 MG/DL (ref 70–99)
GLUCOSE BLD-MCNC: 72 MG/DL (ref 70–99)
GLUCOSE BLD-MCNC: 78 MG/DL (ref 70–99)
GLUCOSE BLD-MCNC: 81 MG/DL (ref 70–99)
GLUCOSE BLD-MCNC: 81 MG/DL (ref 70–99)
GLUCOSE BLD-MCNC: 85 MG/DL (ref 70–99)
GLUCOSE BLD-MCNC: 87 MG/DL (ref 70–99)
GLUCOSE BLD-MCNC: 87 MG/DL (ref 70–99)
GLUCOSE BLD-MCNC: 89 MG/DL (ref 70–99)
GLUCOSE BLD-MCNC: 89 MG/DL (ref 70–99)
GLUCOSE BLD-MCNC: 90 MG/DL (ref 70–99)
GLUCOSE BLD-MCNC: 91 MG/DL (ref 70–99)
GLUCOSE BLD-MCNC: 92 MG/DL (ref 70–99)
GLUCOSE BLD-MCNC: 93 MG/DL (ref 70–99)
GLUCOSE BLD-MCNC: 93 MG/DL (ref 70–99)
GLUCOSE BLD-MCNC: 94 MG/DL (ref 70–99)
GLUCOSE BLD-MCNC: 96 MG/DL
GLUCOSE BLD-MCNC: 96 MG/DL (ref 70–99)
GLUCOSE BLD-MCNC: 96 MG/DL (ref 70–99)
GLUCOSE BLD-MCNC: 97 MG/DL (ref 70–99)
GLUCOSE BLD-MCNC: 98 MG/DL (ref 70–99)
GLUCOSE BLD-MCNC: 99 MG/DL (ref 70–99)
GLUCOSE BLD-MCNC: 99 MG/DL (ref 70–99)
GLUCOSE, URINE: NEGATIVE MG/DL
GRAM SMEAR: ABNORMAL
HAV IGM SER IA-ACNC: NON REACTIVE
HCT VFR BLD CALC: 20.9 % (ref 37–47)
HCT VFR BLD CALC: 24.8 % (ref 37–47)
HCT VFR BLD CALC: 27.5 % (ref 37–47)
HCT VFR BLD CALC: 28.6 % (ref 37–47)
HCT VFR BLD CALC: 29.5 % (ref 37–47)
HCT VFR BLD CALC: 31 % (ref 37–47)
HCT VFR BLD CALC: 31.2 % (ref 37–47)
HCT VFR BLD CALC: 31.4 % (ref 37–47)
HCT VFR BLD CALC: 31.7 % (ref 37–47)
HCT VFR BLD CALC: 31.8 % (ref 37–47)
HCT VFR BLD CALC: 32.2 % (ref 37–47)
HCT VFR BLD CALC: 32.6 % (ref 37–47)
HCT VFR BLD CALC: 32.8 % (ref 37–47)
HCT VFR BLD CALC: 32.8 % (ref 37–47)
HCT VFR BLD CALC: 33.4 % (ref 37–47)
HCT VFR BLD CALC: 33.7 % (ref 37–47)
HCT VFR BLD CALC: 34.3 % (ref 37–47)
HCT VFR BLD CALC: 34.4 % (ref 37–47)
HCT VFR BLD CALC: 34.8 % (ref 37–47)
HCT VFR BLD CALC: 37 % (ref 36–48)
HCT VFR BLD CALC: 42 % (ref 37–47)
HEMOGLOBIN: 10 GM/DL (ref 12.5–16)
HEMOGLOBIN: 10.1 GM/DL (ref 12.5–16)
HEMOGLOBIN: 10.1 GM/DL (ref 12.5–16)
HEMOGLOBIN: 10.2 GM/DL (ref 12.5–16)
HEMOGLOBIN: 10.5 GM/DL (ref 12.5–16)
HEMOGLOBIN: 10.6 GM/DL (ref 12.5–16)
HEMOGLOBIN: 11 GM/DL (ref 12.5–16)
HEMOGLOBIN: 11 GM/DL (ref 12.5–16)
HEMOGLOBIN: 11.2 GM/DL (ref 12.5–16)
HEMOGLOBIN: 12.3 G/DL (ref 12–16)
HEMOGLOBIN: 13.5 GM/DL (ref 12.5–16)
HEMOGLOBIN: 6.8 GM/DL (ref 12.5–16)
HEMOGLOBIN: 8.2 GM/DL (ref 12.5–16)
HEMOGLOBIN: 9.1 GM/DL (ref 12.5–16)
HEMOGLOBIN: 9.2 GM/DL (ref 12.5–16)
HEMOGLOBIN: 9.8 GM/DL (ref 12.5–16)
HEMOGLOBIN: 9.9 GM/DL (ref 12.5–16)
HEPATITIS B CORE IGM ANTIBODY: NON REACTIVE
HEPATITIS B SURFACE ANTIGEN: NON REACTIVE
HEPATITIS C ANTIBODY: NON REACTIVE
HYALINE CASTS: 2 /LPF
HYALINE CASTS: 5 /LPF
HYALINE CASTS: >20 /LPF
ICTOTEST: POSITIVE
IMMATURE NEUTROPHIL %: 0.2 % (ref 0–0.43)
IMMATURE NEUTROPHIL %: 0.3 % (ref 0–0.43)
IMMATURE NEUTROPHIL %: 0.4 % (ref 0–0.43)
IMMATURE NEUTROPHIL %: 0.5 % (ref 0–0.43)
IMMATURE NEUTROPHIL %: 0.6 % (ref 0–0.43)
IMMATURE NEUTROPHIL %: 0.6 % (ref 0–0.43)
IMMATURE NEUTROPHIL %: 1.1 % (ref 0–0.43)
INR BLD: 1.31 INDEX
INR BLD: 1.39 INDEX
INR BLD: 1.42 INDEX
INR BLD: 1.53 INDEX
INR BLD: 1.55 INDEX
INR BLD: 1.62 INDEX
INR BLD: 1.71 INDEX
INR BLD: 2.75 INDEX
IRON: 33 UG/DL (ref 37–145)
IRON: 69 UG/DL (ref 37–145)
KETONES, URINE: ABNORMAL MG/DL
KETONES, URINE: NEGATIVE MG/DL
LACTATE DEHYDROGENASE, FLUID: 119 IU/L
LACTATE DEHYDROGENASE, FLUID: 95 IU/L
LACTATE: 1.8 MMOL/L (ref 0.4–2)
LACTATE: 2.8 MMOL/L (ref 0.4–2)
LACTATE: 4.6 MMOL/L (ref 0.4–2)
LACTIC ACID, SEPSIS: 3 MMOL/L (ref 0.5–1.9)
LEUKOCYTE ESTERASE, URINE: ABNORMAL
LEUKOCYTE ESTERASE, URINE: ABNORMAL
LEUKOCYTE ESTERASE, URINE: NEGATIVE
LIPASE: 35 IU/L (ref 13–60)
LIPASE: 39 IU/L (ref 13–60)
LIPASE: 57 IU/L (ref 13–60)
LV EF: 53 %
LVEF MODALITY: NORMAL
LYMPHOCYTES ABSOLUTE: 1.3 K/CU MM
LYMPHOCYTES ABSOLUTE: 1.5 K/CU MM
LYMPHOCYTES ABSOLUTE: 1.7 K/CU MM
LYMPHOCYTES ABSOLUTE: 1.7 K/UL (ref 1–5.1)
LYMPHOCYTES ABSOLUTE: 1.8 K/CU MM
LYMPHOCYTES ABSOLUTE: 1.8 K/CU MM
LYMPHOCYTES ABSOLUTE: 2 K/CU MM
LYMPHOCYTES ABSOLUTE: 2 K/CU MM
LYMPHOCYTES ABSOLUTE: 2.4 K/CU MM
LYMPHOCYTES ABSOLUTE: 2.6 K/CU MM
LYMPHOCYTES ABSOLUTE: 3.2 K/CU MM
LYMPHOCYTES ABSOLUTE: 3.5 K/CU MM
LYMPHOCYTES ABSOLUTE: 3.9 K/CU MM
LYMPHOCYTES RELATIVE PERCENT: 11.3 % (ref 24–44)
LYMPHOCYTES RELATIVE PERCENT: 14.8 % (ref 24–44)
LYMPHOCYTES RELATIVE PERCENT: 19.2 % (ref 24–44)
LYMPHOCYTES RELATIVE PERCENT: 20.6 % (ref 24–44)
LYMPHOCYTES RELATIVE PERCENT: 20.8 % (ref 24–44)
LYMPHOCYTES RELATIVE PERCENT: 21.9 % (ref 24–44)
LYMPHOCYTES RELATIVE PERCENT: 25 % (ref 24–44)
LYMPHOCYTES RELATIVE PERCENT: 25.6 % (ref 24–44)
LYMPHOCYTES RELATIVE PERCENT: 26 % (ref 24–44)
LYMPHOCYTES RELATIVE PERCENT: 26.2 %
LYMPHOCYTES RELATIVE PERCENT: 29.9 % (ref 24–44)
LYMPHOCYTES RELATIVE PERCENT: 31.3 % (ref 24–44)
LYMPHOCYTES RELATIVE PERCENT: 32.9 % (ref 24–44)
LYMPHOCYTES, BODY FLUID: 49 %
LYMPHOCYTES, BODY FLUID: 49 %
LYMPHOCYTES, BODY FLUID: 62 %
Lab: ABNORMAL
Lab: NORMAL
MAGNESIUM: 1.5 MG/DL (ref 1.8–2.4)
MAGNESIUM: 1.7 MG/DL (ref 1.8–2.4)
MAGNESIUM: 1.8 MG/DL (ref 1.8–2.4)
MAGNESIUM: 2 MG/DL (ref 1.8–2.4)
MAGNESIUM: 2.1 MG/DL (ref 1.8–2.4)
MAGNESIUM: 2.1 MG/DL (ref 1.8–2.4)
MCH RBC QN AUTO: 29.5 PG (ref 27–31)
MCH RBC QN AUTO: 29.7 PG (ref 27–31)
MCH RBC QN AUTO: 30.1 PG (ref 27–31)
MCH RBC QN AUTO: 30.6 PG (ref 27–31)
MCH RBC QN AUTO: 30.7 PG (ref 27–31)
MCH RBC QN AUTO: 30.7 PG (ref 27–31)
MCH RBC QN AUTO: 31.1 PG (ref 27–31)
MCH RBC QN AUTO: 31.2 PG (ref 27–31)
MCH RBC QN AUTO: 31.2 PG (ref 27–31)
MCH RBC QN AUTO: 31.5 PG (ref 27–31)
MCH RBC QN AUTO: 31.6 PG (ref 27–31)
MCH RBC QN AUTO: 31.7 PG (ref 27–31)
MCH RBC QN AUTO: 31.8 PG (ref 27–31)
MCH RBC QN AUTO: 32.3 PG (ref 26–34)
MCHC RBC AUTO-ENTMCNC: 30.7 % (ref 32–36)
MCHC RBC AUTO-ENTMCNC: 31.1 % (ref 32–36)
MCHC RBC AUTO-ENTMCNC: 31.4 % (ref 32–36)
MCHC RBC AUTO-ENTMCNC: 31.5 % (ref 32–36)
MCHC RBC AUTO-ENTMCNC: 31.7 % (ref 32–36)
MCHC RBC AUTO-ENTMCNC: 31.8 % (ref 32–36)
MCHC RBC AUTO-ENTMCNC: 31.9 % (ref 32–36)
MCHC RBC AUTO-ENTMCNC: 32.1 % (ref 32–36)
MCHC RBC AUTO-ENTMCNC: 32.1 % (ref 32–36)
MCHC RBC AUTO-ENTMCNC: 32.2 % (ref 32–36)
MCHC RBC AUTO-ENTMCNC: 32.3 % (ref 32–36)
MCHC RBC AUTO-ENTMCNC: 32.5 % (ref 32–36)
MCHC RBC AUTO-ENTMCNC: 32.6 % (ref 32–36)
MCHC RBC AUTO-ENTMCNC: 32.6 % (ref 32–36)
MCHC RBC AUTO-ENTMCNC: 33.1 % (ref 32–36)
MCHC RBC AUTO-ENTMCNC: 33.1 % (ref 32–36)
MCHC RBC AUTO-ENTMCNC: 33.2 G/DL (ref 31–36)
MCV RBC AUTO: 103.2 FL (ref 78–100)
MCV RBC AUTO: 89.2 FL (ref 78–100)
MCV RBC AUTO: 89.9 FL (ref 78–100)
MCV RBC AUTO: 94.2 FL (ref 78–100)
MCV RBC AUTO: 95 FL (ref 78–100)
MCV RBC AUTO: 95.5 FL (ref 78–100)
MCV RBC AUTO: 95.7 FL (ref 78–100)
MCV RBC AUTO: 96.2 FL (ref 78–100)
MCV RBC AUTO: 96.6 FL (ref 78–100)
MCV RBC AUTO: 97.2 FL (ref 78–100)
MCV RBC AUTO: 97.2 FL (ref 78–100)
MCV RBC AUTO: 97.5 FL (ref 78–100)
MCV RBC AUTO: 97.5 FL (ref 80–100)
MCV RBC AUTO: 97.8 FL (ref 78–100)
MCV RBC AUTO: 97.9 FL (ref 78–100)
MCV RBC AUTO: 98.2 FL (ref 78–100)
MCV RBC AUTO: 98.5 FL (ref 78–100)
MESOTHELIAL FLUID: 1 /100 WBC
MESOTHELIAL FLUID: 4 /100 WBC
MONOCYTE, FLUID: 14 %
MONOCYTE, FLUID: 26 %
MONOCYTE, FLUID: 26 %
MONOCYTES ABSOLUTE: 0.6 K/CU MM
MONOCYTES ABSOLUTE: 0.6 K/CU MM
MONOCYTES ABSOLUTE: 0.7 K/CU MM
MONOCYTES ABSOLUTE: 0.7 K/CU MM
MONOCYTES ABSOLUTE: 0.7 K/UL (ref 0–1.3)
MONOCYTES ABSOLUTE: 0.9 K/CU MM
MONOCYTES ABSOLUTE: 1 K/CU MM
MONOCYTES ABSOLUTE: 1.1 K/CU MM
MONOCYTES ABSOLUTE: 1.4 K/CU MM
MONOCYTES ABSOLUTE: 1.4 K/CU MM
MONOCYTES ABSOLUTE: 1.5 K/CU MM
MONOCYTES RELATIVE PERCENT: 10.3 % (ref 0–4)
MONOCYTES RELATIVE PERCENT: 10.5 % (ref 0–4)
MONOCYTES RELATIVE PERCENT: 11.1 % (ref 0–4)
MONOCYTES RELATIVE PERCENT: 11.1 % (ref 0–4)
MONOCYTES RELATIVE PERCENT: 11.3 %
MONOCYTES RELATIVE PERCENT: 11.8 % (ref 0–4)
MONOCYTES RELATIVE PERCENT: 11.8 % (ref 0–4)
MONOCYTES RELATIVE PERCENT: 6.4 % (ref 0–4)
MONOCYTES RELATIVE PERCENT: 8.3 % (ref 0–4)
MONOCYTES RELATIVE PERCENT: 8.5 % (ref 0–4)
MONOCYTES RELATIVE PERCENT: 8.6 % (ref 0–4)
MONOCYTES RELATIVE PERCENT: 8.8 % (ref 0–4)
MONOCYTES RELATIVE PERCENT: 9.4 % (ref 0–4)
MS ALPHA-FETOPROTEIN: 3 NG/ML (ref 0–9)
MUCUS: ABNORMAL HPF
MUCUS: ABNORMAL HPF
MUCUS: NEGATIVE HPF
NEUTROPHIL, FLUID: 24 %
NEUTROPHIL, FLUID: 25 %
NEUTROPHIL, FLUID: 25 %
NEUTROPHILS ABSOLUTE: 3.8 K/UL (ref 1.7–7.7)
NEUTROPHILS RELATIVE PERCENT: 60 %
NITRITE URINE, QUANTITATIVE: NEGATIVE
NUCLEATED RBC %: 0 %
OPIATES, URINE: NEGATIVE
OSMOLALITY URINE: 335 MOS/L (ref 292–1090)
OXYCODONE: NEGATIVE
PCT TRANSFERRIN: 14 % (ref 10–44)
PCT TRANSFERRIN: 35 % (ref 10–44)
PDW BLD-RTO: 14.6 % (ref 11.7–14.9)
PDW BLD-RTO: 15.1 % (ref 11.7–14.9)
PDW BLD-RTO: 15.3 % (ref 11.7–14.9)
PDW BLD-RTO: 15.4 % (ref 11.7–14.9)
PDW BLD-RTO: 15.6 % (ref 11.7–14.9)
PDW BLD-RTO: 15.6 % (ref 11.7–14.9)
PDW BLD-RTO: 15.8 % (ref 11.7–14.9)
PDW BLD-RTO: 15.9 % (ref 11.7–14.9)
PDW BLD-RTO: 16 % (ref 12.4–15.4)
PDW BLD-RTO: 16.2 % (ref 11.7–14.9)
PDW BLD-RTO: 16.5 % (ref 11.7–14.9)
PDW BLD-RTO: 16.5 % (ref 11.7–14.9)
PDW BLD-RTO: 16.9 % (ref 11.7–14.9)
PDW BLD-RTO: 18.3 % (ref 11.7–14.9)
PDW BLD-RTO: 19 % (ref 11.7–14.9)
PH, URINE: 5 (ref 5–8)
PH, URINE: 5 (ref 5–8)
PH, URINE: 5.5 (ref 5–8)
PH, URINE: 6 (ref 5–8)
PHENCYCLIDINE, URINE: NEGATIVE
PHOSPHORUS: 2.1 MG/DL (ref 2.5–4.9)
PHOSPHORUS: 3.3 MG/DL (ref 2.5–4.9)
PHOSPHORUS: 3.8 MG/DL (ref 2.5–4.9)
PHOSPHORUS: 4 MG/DL (ref 2.5–4.9)
PLATELET # BLD: 105 K/CU MM (ref 140–440)
PLATELET # BLD: 108 K/CU MM (ref 140–440)
PLATELET # BLD: 108 K/CU MM (ref 140–440)
PLATELET # BLD: 117 K/CU MM (ref 140–440)
PLATELET # BLD: 125 K/CU MM (ref 140–440)
PLATELET # BLD: 131 K/CU MM (ref 140–440)
PLATELET # BLD: 132 K/CU MM (ref 140–440)
PLATELET # BLD: 135 K/CU MM (ref 140–440)
PLATELET # BLD: 138 K/UL (ref 135–450)
PLATELET # BLD: 140 K/CU MM (ref 140–440)
PLATELET # BLD: 141 K/CU MM (ref 140–440)
PLATELET # BLD: 143 K/CU MM (ref 140–440)
PLATELET # BLD: 143 K/CU MM (ref 140–440)
PLATELET # BLD: 144 K/CU MM (ref 140–440)
PLATELET # BLD: 150 K/CU MM (ref 140–440)
PLATELET # BLD: 159 K/CU MM (ref 140–440)
PLATELET # BLD: 166 K/CU MM (ref 140–440)
PMV BLD AUTO: 10.4 FL (ref 5–10.5)
PMV BLD AUTO: 11.1 FL (ref 7.5–11.1)
PMV BLD AUTO: 11.6 FL (ref 7.5–11.1)
PMV BLD AUTO: 11.7 FL (ref 7.5–11.1)
PMV BLD AUTO: 11.7 FL (ref 7.5–11.1)
PMV BLD AUTO: 11.8 FL (ref 7.5–11.1)
PMV BLD AUTO: 12 FL (ref 7.5–11.1)
PMV BLD AUTO: 12 FL (ref 7.5–11.1)
PMV BLD AUTO: 12.1 FL (ref 7.5–11.1)
PMV BLD AUTO: 12.2 FL (ref 7.5–11.1)
PMV BLD AUTO: 12.4 FL (ref 7.5–11.1)
PMV BLD AUTO: 12.4 FL (ref 7.5–11.1)
PMV BLD AUTO: 12.7 FL (ref 7.5–11.1)
PMV BLD AUTO: 13 FL (ref 7.5–11.1)
PMV BLD AUTO: 13.1 FL (ref 7.5–11.1)
POC CREATININE: 0.7 MG/DL (ref 0.6–1.1)
POTASSIUM SERPL-SCNC: 3.2 MMOL/L (ref 3.5–5.1)
POTASSIUM SERPL-SCNC: 3.5 MMOL/L (ref 3.5–5.1)
POTASSIUM SERPL-SCNC: 3.5 MMOL/L (ref 3.5–5.1)
POTASSIUM SERPL-SCNC: 3.6 MMOL/L (ref 3.5–5.1)
POTASSIUM SERPL-SCNC: 3.6 MMOL/L (ref 3.5–5.1)
POTASSIUM SERPL-SCNC: 3.7 MMOL/L (ref 3.5–5.1)
POTASSIUM SERPL-SCNC: 3.7 MMOL/L (ref 3.5–5.1)
POTASSIUM SERPL-SCNC: 3.9 MMOL/L (ref 3.5–5.1)
POTASSIUM SERPL-SCNC: 3.9 MMOL/L (ref 3.5–5.1)
POTASSIUM SERPL-SCNC: 4 MMOL/L (ref 3.5–5.1)
POTASSIUM SERPL-SCNC: 4 MMOL/L (ref 3.5–5.1)
POTASSIUM SERPL-SCNC: 4.1 MMOL/L (ref 3.5–5.1)
POTASSIUM SERPL-SCNC: 4.1 MMOL/L (ref 3.5–5.1)
POTASSIUM SERPL-SCNC: 4.2 MMOL/L (ref 3.5–5.1)
POTASSIUM SERPL-SCNC: 4.8 MMOL/L (ref 3.5–5.1)
POTASSIUM SERPL-SCNC: 4.8 MMOL/L (ref 3.5–5.1)
POTASSIUM SERPL-SCNC: 4.9 MMOL/L (ref 3.5–5.1)
POTASSIUM SERPL-SCNC: 5 MMOL/L (ref 3.5–5.1)
POTASSIUM SERPL-SCNC: 5.1 MMOL/L (ref 3.5–5.1)
POTASSIUM SERPL-SCNC: 6.2 MMOL/L (ref 3.5–5.1)
PRO-BNP: 1222 PG/ML
PROT/CREAT RATIO, UR: 0.2
PROT/CREAT RATIO, UR: 0.2
PROT/CREAT RATIO, UR: 1.2
PROTEIN FLUID: 1.5 GM/DL
PROTEIN FLUID: 1.6 GM/DL
PROTEIN FLUID: 2 GM/DL
PROTEIN UA: ABNORMAL MG/DL
PROTEIN UA: NEGATIVE MG/DL
PROTHROMBIN TIME: 15 SECONDS (ref 11.7–14.5)
PROTHROMBIN TIME: 16.9 SECONDS (ref 11.7–14.5)
PROTHROMBIN TIME: 17.3 SECONDS (ref 11.7–14.5)
PROTHROMBIN TIME: 17.6 SECONDS (ref 11.7–14.5)
PROTHROMBIN TIME: 17.8 SECONDS (ref 11.7–14.5)
PROTHROMBIN TIME: 19.7 SECONDS (ref 11.7–14.5)
PROTHROMBIN TIME: 20.8 SECONDS (ref 11.7–14.5)
PROTHROMBIN TIME: 33.6 SECONDS (ref 11.7–14.5)
RBC # BLD: 2.15 M/CU MM (ref 4.2–5.4)
RBC # BLD: 2.78 M/CU MM (ref 4.2–5.4)
RBC # BLD: 2.96 M/CU MM (ref 4.2–5.4)
RBC # BLD: 3.06 M/CU MM (ref 4.2–5.4)
RBC # BLD: 3.12 M/CU MM (ref 4.2–5.4)
RBC # BLD: 3.18 M/CU MM (ref 4.2–5.4)
RBC # BLD: 3.24 M/CU MM (ref 4.2–5.4)
RBC # BLD: 3.27 M/CU MM (ref 4.2–5.4)
RBC # BLD: 3.33 M/CU MM (ref 4.2–5.4)
RBC # BLD: 3.4 M/CU MM (ref 4.2–5.4)
RBC # BLD: 3.41 M/CU MM (ref 4.2–5.4)
RBC # BLD: 3.43 M/CU MM (ref 4.2–5.4)
RBC # BLD: 3.52 M/CU MM (ref 4.2–5.4)
RBC # BLD: 3.59 M/CU MM (ref 4.2–5.4)
RBC # BLD: 3.65 M/CU MM (ref 4.2–5.4)
RBC # BLD: 3.8 M/UL (ref 4–5.2)
RBC # BLD: 4.29 M/CU MM (ref 4.2–5.4)
RBC FLUID: 108 /CU MM
RBC FLUID: 274 /CU MM
RBC FLUID: 95 /CU MM
RBC URINE: <1 /HPF (ref 0–6)
RBC URINE: ABNORMAL /HPF (ref 0–6)
RBC URINE: NEGATIVE /HPF (ref 0–6)
REASON FOR REJECTION: NORMAL
REJECTED TEST: NORMAL
SARS-COV-2: NOT DETECTED
SEGMENTED NEUTROPHILS ABSOLUTE COUNT: 4.3 K/CU MM
SEGMENTED NEUTROPHILS ABSOLUTE COUNT: 4.8 K/CU MM
SEGMENTED NEUTROPHILS ABSOLUTE COUNT: 5 K/CU MM
SEGMENTED NEUTROPHILS ABSOLUTE COUNT: 5.9 K/CU MM
SEGMENTED NEUTROPHILS ABSOLUTE COUNT: 6 K/CU MM
SEGMENTED NEUTROPHILS ABSOLUTE COUNT: 6 K/CU MM
SEGMENTED NEUTROPHILS ABSOLUTE COUNT: 6.3 K/CU MM
SEGMENTED NEUTROPHILS ABSOLUTE COUNT: 6.4 K/CU MM
SEGMENTED NEUTROPHILS ABSOLUTE COUNT: 6.8 K/CU MM
SEGMENTED NEUTROPHILS ABSOLUTE COUNT: 8.3 K/CU MM
SEGMENTED NEUTROPHILS ABSOLUTE COUNT: 8.7 K/CU MM
SEGMENTED NEUTROPHILS ABSOLUTE COUNT: 8.9 K/CU MM
SEGMENTED NEUTROPHILS RELATIVE PERCENT: 54.2 % (ref 36–66)
SEGMENTED NEUTROPHILS RELATIVE PERCENT: 54.6 % (ref 36–66)
SEGMENTED NEUTROPHILS RELATIVE PERCENT: 59.7 % (ref 36–66)
SEGMENTED NEUTROPHILS RELATIVE PERCENT: 60.9 % (ref 36–66)
SEGMENTED NEUTROPHILS RELATIVE PERCENT: 61.5 % (ref 36–66)
SEGMENTED NEUTROPHILS RELATIVE PERCENT: 63.4 % (ref 36–66)
SEGMENTED NEUTROPHILS RELATIVE PERCENT: 69.1 % (ref 36–66)
SEGMENTED NEUTROPHILS RELATIVE PERCENT: 69.2 % (ref 36–66)
SEGMENTED NEUTROPHILS RELATIVE PERCENT: 70 % (ref 36–66)
SEGMENTED NEUTROPHILS RELATIVE PERCENT: 70.2 % (ref 36–66)
SEGMENTED NEUTROPHILS RELATIVE PERCENT: 72.7 % (ref 36–66)
SEGMENTED NEUTROPHILS RELATIVE PERCENT: 78.2 % (ref 36–66)
SODIUM BLD-SCNC: 131 MMOL/L (ref 135–145)
SODIUM BLD-SCNC: 131 MMOL/L (ref 135–145)
SODIUM BLD-SCNC: 132 MMOL/L (ref 135–145)
SODIUM BLD-SCNC: 134 MMOL/L (ref 135–145)
SODIUM BLD-SCNC: 135 MMOL/L (ref 135–145)
SODIUM BLD-SCNC: 135 MMOL/L (ref 135–145)
SODIUM BLD-SCNC: 136 MMOL/L (ref 135–145)
SODIUM BLD-SCNC: 136 MMOL/L (ref 135–145)
SODIUM BLD-SCNC: 137 MMOL/L (ref 135–145)
SODIUM BLD-SCNC: 139 MMOL/L (ref 135–145)
SODIUM BLD-SCNC: 140 MMOL/L (ref 135–145)
SODIUM BLD-SCNC: 141 MMOL/L (ref 135–145)
SODIUM BLD-SCNC: 141 MMOL/L (ref 136–145)
SODIUM BLD-SCNC: 144 MMOL/L (ref 135–145)
SODIUM BLD-SCNC: 145 MMOL/L (ref 135–145)
SODIUM BLD-SCNC: 146 MMOL/L (ref 135–145)
SODIUM BLD-SCNC: 147 MMOL/L (ref 135–145)
SODIUM URINE: 39 MMOL/L (ref 35–167)
SOURCE FLUID: NORMAL
SOURCE: NORMAL
SPECIFIC GRAVITY UA: 1.01 (ref 1–1.03)
SPECIFIC GRAVITY UA: 1.02 (ref 1–1.03)
SPECIMEN: ABNORMAL
SPECIMEN: NORMAL
SPEP INTERPRETATION: ABNORMAL
SPEP INTERPRETATION: NORMAL
SQUAMOUS EPITHELIAL: <1 /HPF
SQUAMOUS EPITHELIAL: <1 /HPF
STATUS: NORMAL
TOTAL COLONY COUNT: ABNORMAL
TOTAL IMMATURE NEUTOROPHIL: 0.02 K/CU MM
TOTAL IMMATURE NEUTOROPHIL: 0.03 K/CU MM
TOTAL IMMATURE NEUTOROPHIL: 0.03 K/CU MM
TOTAL IMMATURE NEUTOROPHIL: 0.04 K/CU MM
TOTAL IMMATURE NEUTOROPHIL: 0.05 K/CU MM
TOTAL IMMATURE NEUTOROPHIL: 0.07 K/CU MM
TOTAL IMMATURE NEUTOROPHIL: 0.08 K/CU MM
TOTAL IMMATURE NEUTOROPHIL: 0.15 K/CU MM
TOTAL IRON BINDING CAPACITY: 196 UG/DL (ref 250–450)
TOTAL IRON BINDING CAPACITY: 230 UG/DL (ref 250–450)
TOTAL NUCLEATED RBC: 0 K/CU MM
TOTAL PROTEIN: 5.3 GM/DL (ref 6.4–8.2)
TOTAL PROTEIN: 5.7 GM/DL (ref 6.4–8.2)
TOTAL PROTEIN: 5.8 GM/DL (ref 6.4–8.2)
TOTAL PROTEIN: 5.8 GM/DL (ref 6.4–8.2)
TOTAL PROTEIN: 6 GM/DL (ref 6.4–8.2)
TOTAL PROTEIN: 6.1 GM/DL (ref 6.4–8.2)
TOTAL PROTEIN: 6.1 GM/DL (ref 6.4–8.2)
TOTAL PROTEIN: 6.3 GM/DL (ref 6.4–8.2)
TOTAL PROTEIN: 6.5 GM/DL (ref 6.4–8.2)
TOTAL PROTEIN: 6.8 G/DL (ref 6.4–8.2)
TOTAL PROTEIN: 6.9 GM/DL (ref 6.4–8.2)
TOTAL PROTEIN: 6.9 GM/DL (ref 6.4–8.2)
TOTAL PROTEIN: 7.4 GM/DL (ref 6.4–8.2)
TOTAL PROTEIN: 7.6 GM/DL (ref 6.4–8.2)
TOTAL PROTEIN: 7.9 GM/DL (ref 6.4–8.2)
TOTAL PROTEIN: 8 GM/DL (ref 6.4–8.2)
TOTAL PROTEIN: 8.1 GM/DL (ref 6.4–8.2)
TRANSFERRIN: 148.5 MG/DL (ref 200–360)
TRANSFUSION STATUS: NORMAL
TRANSITIONAL EPITHELIAL: <1 /HPF
TRICHOMONAS: ABNORMAL /HPF
TRICHOMONAS: ABNORMAL /HPF
TRICHOMONAS: NORMAL /HPF
TROPONIN T: 0.02 NG/ML
TROPONIN T: 0.03 NG/ML
TROPONIN T: <0.01 NG/ML
UNIT DIVISION: 0
UNIT NUMBER: NORMAL
UNSATURATED IRON BINDING CAPACITY: 127 UG/DL (ref 110–370)
UNSATURATED IRON BINDING CAPACITY: 197 UG/DL (ref 110–370)
URINE TOTAL PROTEIN: 27.6 MG/DL
URINE TOTAL PROTEIN: 50.2 MG/DL
URINE TOTAL PROTEIN: 8 MG/DL
UROBILINOGEN, URINE: 0.2 MG/DL (ref 0.2–1)
UROBILINOGEN, URINE: NORMAL MG/DL (ref 0.2–1)
VITAMIN B-12: 1892 PG/ML (ref 211–911)
VOLUME, (UVOL): 12 ML (ref 10–12)
WBC # BLD: 10.6 K/CU MM (ref 4–10.5)
WBC # BLD: 11.3 K/CU MM (ref 4–10.5)
WBC # BLD: 11.4 K/CU MM (ref 4–10.5)
WBC # BLD: 11.9 K/CU MM (ref 4–10.5)
WBC # BLD: 12.5 K/CU MM (ref 4–10.5)
WBC # BLD: 13.5 K/CU MM (ref 4–10.5)
WBC # BLD: 6.4 K/CU MM (ref 4–10.5)
WBC # BLD: 6.4 K/UL (ref 4–11)
WBC # BLD: 7.3 K/CU MM (ref 4–10.5)
WBC # BLD: 7.8 K/CU MM (ref 4–10.5)
WBC # BLD: 8 K/CU MM (ref 4–10.5)
WBC # BLD: 8.5 K/CU MM (ref 4–10.5)
WBC # BLD: 8.6 K/CU MM (ref 4–10.5)
WBC # BLD: 8.9 K/CU MM (ref 4–10.5)
WBC # BLD: 9.1 K/CU MM (ref 4–10.5)
WBC # BLD: 9.2 K/CU MM (ref 4–10.5)
WBC # BLD: 9.5 K/CU MM (ref 4–10.5)
WBC FLUID: 123 /CU MM
WBC FLUID: 279 /CU MM
WBC FLUID: 97 /CU MM
WBC UA: 1 /HPF (ref 0–5)
WBC UA: 3 /HPF (ref 0–5)
WBC UA: <1 /HPF (ref 0–5)
WBC UA: ABNORMAL /HPF (ref 0–5)
WBC UA: ABNORMAL /HPF (ref 0–5)
WBC UA: NEGATIVE /HPF (ref 0–5)

## 2020-01-01 PROCEDURE — 86901 BLOOD TYPING SEROLOGIC RH(D): CPT

## 2020-01-01 PROCEDURE — 80053 COMPREHEN METABOLIC PANEL: CPT

## 2020-01-01 PROCEDURE — 87186 SC STD MICRODIL/AGAR DIL: CPT

## 2020-01-01 PROCEDURE — 6360000002 HC RX W HCPCS: Performed by: INTERNAL MEDICINE

## 2020-01-01 PROCEDURE — 6360000004 HC RX CONTRAST MEDICATION: Performed by: PHYSICIAN ASSISTANT

## 2020-01-01 PROCEDURE — 84100 ASSAY OF PHOSPHORUS: CPT

## 2020-01-01 PROCEDURE — 6360000002 HC RX W HCPCS: Performed by: ORTHOPAEDIC SURGERY

## 2020-01-01 PROCEDURE — 0W9G3ZZ DRAINAGE OF PERITONEAL CAVITY, PERCUTANEOUS APPROACH: ICD-10-PCS | Performed by: INTERNAL MEDICINE

## 2020-01-01 PROCEDURE — 83690 ASSAY OF LIPASE: CPT

## 2020-01-01 PROCEDURE — 97166 OT EVAL MOD COMPLEX 45 MIN: CPT

## 2020-01-01 PROCEDURE — 6370000000 HC RX 637 (ALT 250 FOR IP): Performed by: INTERNAL MEDICINE

## 2020-01-01 PROCEDURE — P9047 ALBUMIN (HUMAN), 25%, 50ML: HCPCS | Performed by: INTERNAL MEDICINE

## 2020-01-01 PROCEDURE — 84466 ASSAY OF TRANSFERRIN: CPT

## 2020-01-01 PROCEDURE — 72125 CT NECK SPINE W/O DYE: CPT

## 2020-01-01 PROCEDURE — 2700000000 HC OXYGEN THERAPY PER DAY

## 2020-01-01 PROCEDURE — 72170 X-RAY EXAM OF PELVIS: CPT

## 2020-01-01 PROCEDURE — 97535 SELF CARE MNGMENT TRAINING: CPT

## 2020-01-01 PROCEDURE — 3600000014 HC SURGERY LEVEL 4 ADDTL 15MIN: Performed by: ORTHOPAEDIC SURGERY

## 2020-01-01 PROCEDURE — 1200000000 HC SEMI PRIVATE

## 2020-01-01 PROCEDURE — 6370000000 HC RX 637 (ALT 250 FOR IP): Performed by: EMERGENCY MEDICINE

## 2020-01-01 PROCEDURE — 51702 INSERT TEMP BLADDER CATH: CPT

## 2020-01-01 PROCEDURE — 97162 PT EVAL MOD COMPLEX 30 MIN: CPT

## 2020-01-01 PROCEDURE — 94761 N-INVAS EAR/PLS OXIMETRY MLT: CPT

## 2020-01-01 PROCEDURE — 2709999900 HC NON-CHARGEABLE SUPPLY

## 2020-01-01 PROCEDURE — 6360000002 HC RX W HCPCS: Performed by: FAMILY MEDICINE

## 2020-01-01 PROCEDURE — 97116 GAIT TRAINING THERAPY: CPT

## 2020-01-01 PROCEDURE — 83605 ASSAY OF LACTIC ACID: CPT

## 2020-01-01 PROCEDURE — 2580000003 HC RX 258: Performed by: INTERNAL MEDICINE

## 2020-01-01 PROCEDURE — 73552 X-RAY EXAM OF FEMUR 2/>: CPT

## 2020-01-01 PROCEDURE — 36430 TRANSFUSION BLD/BLD COMPNT: CPT

## 2020-01-01 PROCEDURE — 36415 COLL VENOUS BLD VENIPUNCTURE: CPT

## 2020-01-01 PROCEDURE — 1090F PRES/ABSN URINE INCON ASSESS: CPT | Performed by: FAMILY MEDICINE

## 2020-01-01 PROCEDURE — 81001 URINALYSIS AUTO W/SCOPE: CPT

## 2020-01-01 PROCEDURE — 80048 BASIC METABOLIC PNL TOTAL CA: CPT

## 2020-01-01 PROCEDURE — 6370000000 HC RX 637 (ALT 250 FOR IP): Performed by: ORTHOPAEDIC SURGERY

## 2020-01-01 PROCEDURE — 83735 ASSAY OF MAGNESIUM: CPT

## 2020-01-01 PROCEDURE — 82962 GLUCOSE BLOOD TEST: CPT

## 2020-01-01 PROCEDURE — 2580000003 HC RX 258: Performed by: RADIOLOGY

## 2020-01-01 PROCEDURE — 4040F PNEUMOC VAC/ADMIN/RCVD: CPT | Performed by: FAMILY MEDICINE

## 2020-01-01 PROCEDURE — 84157 ASSAY OF PROTEIN OTHER: CPT

## 2020-01-01 PROCEDURE — 85610 PROTHROMBIN TIME: CPT

## 2020-01-01 PROCEDURE — 1036F TOBACCO NON-USER: CPT | Performed by: FAMILY MEDICINE

## 2020-01-01 PROCEDURE — 89051 BODY FLUID CELL COUNT: CPT

## 2020-01-01 PROCEDURE — 82150 ASSAY OF AMYLASE: CPT

## 2020-01-01 PROCEDURE — 93010 ELECTROCARDIOGRAM REPORT: CPT | Performed by: INTERNAL MEDICINE

## 2020-01-01 PROCEDURE — 82607 VITAMIN B-12: CPT

## 2020-01-01 PROCEDURE — 6360000002 HC RX W HCPCS: Performed by: NURSE ANESTHETIST, CERTIFIED REGISTERED

## 2020-01-01 PROCEDURE — 97530 THERAPEUTIC ACTIVITIES: CPT

## 2020-01-01 PROCEDURE — G8420 CALC BMI NORM PARAMETERS: HCPCS | Performed by: PHYSICIAN ASSISTANT

## 2020-01-01 PROCEDURE — 97110 THERAPEUTIC EXERCISES: CPT

## 2020-01-01 PROCEDURE — 6370000000 HC RX 637 (ALT 250 FOR IP): Performed by: SPECIALIST

## 2020-01-01 PROCEDURE — 0QS636Z REPOSITION RIGHT UPPER FEMUR WITH INTRAMEDULLARY INTERNAL FIXATION DEVICE, PERCUTANEOUS APPROACH: ICD-10-PCS | Performed by: ORTHOPAEDIC SURGERY

## 2020-01-01 PROCEDURE — 93005 ELECTROCARDIOGRAM TRACING: CPT | Performed by: EMERGENCY MEDICINE

## 2020-01-01 PROCEDURE — 85730 THROMBOPLASTIN TIME PARTIAL: CPT

## 2020-01-01 PROCEDURE — 84300 ASSAY OF URINE SODIUM: CPT

## 2020-01-01 PROCEDURE — 80069 RENAL FUNCTION PANEL: CPT

## 2020-01-01 PROCEDURE — 85014 HEMATOCRIT: CPT

## 2020-01-01 PROCEDURE — 99231 SBSQ HOSP IP/OBS SF/LOW 25: CPT | Performed by: ORTHOPAEDIC SURGERY

## 2020-01-01 PROCEDURE — 7100000000 HC PACU RECOVERY - FIRST 15 MIN: Performed by: ORTHOPAEDIC SURGERY

## 2020-01-01 PROCEDURE — 82746 ASSAY OF FOLIC ACID SERUM: CPT

## 2020-01-01 PROCEDURE — 6370000000 HC RX 637 (ALT 250 FOR IP): Performed by: NURSE PRACTITIONER

## 2020-01-01 PROCEDURE — 74174 CTA ABD&PLVS W/CONTRAST: CPT

## 2020-01-01 PROCEDURE — 7100000001 HC PACU RECOVERY - ADDTL 15 MIN: Performed by: ORTHOPAEDIC SURGERY

## 2020-01-01 PROCEDURE — 6360000002 HC RX W HCPCS: Performed by: EMERGENCY MEDICINE

## 2020-01-01 PROCEDURE — C1729 CATH, DRAINAGE: HCPCS

## 2020-01-01 PROCEDURE — 83615 LACTATE (LD) (LDH) ENZYME: CPT

## 2020-01-01 PROCEDURE — 99285 EMERGENCY DEPT VISIT HI MDM: CPT

## 2020-01-01 PROCEDURE — G8427 DOCREV CUR MEDS BY ELIG CLIN: HCPCS | Performed by: FAMILY MEDICINE

## 2020-01-01 PROCEDURE — 87205 SMEAR GRAM STAIN: CPT

## 2020-01-01 PROCEDURE — 86850 RBC ANTIBODY SCREEN: CPT

## 2020-01-01 PROCEDURE — 96375 TX/PRO/DX INJ NEW DRUG ADDON: CPT

## 2020-01-01 PROCEDURE — 70450 CT HEAD/BRAIN W/O DYE: CPT

## 2020-01-01 PROCEDURE — 2580000003 HC RX 258: Performed by: ORTHOPAEDIC SURGERY

## 2020-01-01 PROCEDURE — G8510 SCR DEP NEG, NO PLAN REQD: HCPCS | Performed by: PHYSICIAN ASSISTANT

## 2020-01-01 PROCEDURE — P9047 ALBUMIN (HUMAN), 25%, 50ML: HCPCS | Performed by: RADIOLOGY

## 2020-01-01 PROCEDURE — 71045 X-RAY EXAM CHEST 1 VIEW: CPT

## 2020-01-01 PROCEDURE — 85025 COMPLETE CBC W/AUTO DIFF WBC: CPT

## 2020-01-01 PROCEDURE — 88305 TISSUE EXAM BY PATHOLOGIST: CPT

## 2020-01-01 PROCEDURE — 80074 ACUTE HEPATITIS PANEL: CPT

## 2020-01-01 PROCEDURE — 93306 TTE W/DOPPLER COMPLETE: CPT

## 2020-01-01 PROCEDURE — U0002 COVID-19 LAB TEST NON-CDC: HCPCS

## 2020-01-01 PROCEDURE — 1123F ACP DISCUSS/DSCN MKR DOCD: CPT | Performed by: FAMILY MEDICINE

## 2020-01-01 PROCEDURE — G8399 PT W/DXA RESULTS DOCUMENT: HCPCS | Performed by: PHYSICIAN ASSISTANT

## 2020-01-01 PROCEDURE — 87073 CULTURE BACTERIA ANAEROBIC: CPT

## 2020-01-01 PROCEDURE — 84156 ASSAY OF PROTEIN URINE: CPT

## 2020-01-01 PROCEDURE — 82140 ASSAY OF AMMONIA: CPT

## 2020-01-01 PROCEDURE — 6360000002 HC RX W HCPCS: Performed by: PHYSICIAN ASSISTANT

## 2020-01-01 PROCEDURE — 1036F TOBACCO NON-USER: CPT | Performed by: PHYSICIAN ASSISTANT

## 2020-01-01 PROCEDURE — 87040 BLOOD CULTURE FOR BACTERIA: CPT

## 2020-01-01 PROCEDURE — 6370000000 HC RX 637 (ALT 250 FOR IP): Performed by: FAMILY MEDICINE

## 2020-01-01 PROCEDURE — 3700000000 HC ANESTHESIA ATTENDED CARE: Performed by: ORTHOPAEDIC SURGERY

## 2020-01-01 PROCEDURE — 49083 ABD PARACENTESIS W/IMAGING: CPT

## 2020-01-01 PROCEDURE — 2140000000 HC CCU INTERMEDIATE R&B

## 2020-01-01 PROCEDURE — 87071 CULTURE AEROBIC QUANT OTHER: CPT

## 2020-01-01 PROCEDURE — G0480 DRUG TEST DEF 1-7 CLASSES: HCPCS

## 2020-01-01 PROCEDURE — 2580000003 HC RX 258: Performed by: EMERGENCY MEDICINE

## 2020-01-01 PROCEDURE — 51701 INSERT BLADDER CATHETER: CPT

## 2020-01-01 PROCEDURE — 86256 FLUORESCENT ANTIBODY TITER: CPT

## 2020-01-01 PROCEDURE — G0010 ADMIN HEPATITIS B VACCINE: HCPCS | Performed by: SPECIALIST

## 2020-01-01 PROCEDURE — 51798 US URINE CAPACITY MEASURE: CPT

## 2020-01-01 PROCEDURE — 0W9G3ZZ DRAINAGE OF PERITONEAL CAVITY, PERCUTANEOUS APPROACH: ICD-10-PCS | Performed by: RADIOLOGY

## 2020-01-01 PROCEDURE — 97163 PT EVAL HIGH COMPLEX 45 MIN: CPT

## 2020-01-01 PROCEDURE — 86922 COMPATIBILITY TEST ANTIGLOB: CPT

## 2020-01-01 PROCEDURE — 96365 THER/PROPH/DIAG IV INF INIT: CPT

## 2020-01-01 PROCEDURE — 85027 COMPLETE CBC AUTOMATED: CPT

## 2020-01-01 PROCEDURE — 83880 ASSAY OF NATRIURETIC PEPTIDE: CPT

## 2020-01-01 PROCEDURE — P9016 RBC LEUKOCYTES REDUCED: HCPCS

## 2020-01-01 PROCEDURE — 2720000010 HC SURG SUPPLY STERILE: Performed by: ORTHOPAEDIC SURGERY

## 2020-01-01 PROCEDURE — 6360000002 HC RX W HCPCS: Performed by: RADIOLOGY

## 2020-01-01 PROCEDURE — 82042 OTHER SOURCE ALBUMIN QUAN EA: CPT

## 2020-01-01 PROCEDURE — 87086 URINE CULTURE/COLONY COUNT: CPT

## 2020-01-01 PROCEDURE — 99222 1ST HOSP IP/OBS MODERATE 55: CPT | Performed by: ORTHOPAEDIC SURGERY

## 2020-01-01 PROCEDURE — 88108 CYTOPATH CONCENTRATE TECH: CPT

## 2020-01-01 PROCEDURE — 99024 POSTOP FOLLOW-UP VISIT: CPT | Performed by: ORTHOPAEDIC SURGERY

## 2020-01-01 PROCEDURE — 1111F DSCHRG MED/CURRENT MED MERGE: CPT | Performed by: PHYSICIAN ASSISTANT

## 2020-01-01 PROCEDURE — 3700000001 HC ADD 15 MINUTES (ANESTHESIA): Performed by: ORTHOPAEDIC SURGERY

## 2020-01-01 PROCEDURE — 96374 THER/PROPH/DIAG INJ IV PUSH: CPT

## 2020-01-01 PROCEDURE — 2500000003 HC RX 250 WO HCPCS: Performed by: NURSE ANESTHETIST, CERTIFIED REGISTERED

## 2020-01-01 PROCEDURE — 74019 RADEX ABDOMEN 2 VIEWS: CPT

## 2020-01-01 PROCEDURE — G8427 DOCREV CUR MEDS BY ELIG CLIN: HCPCS | Performed by: PHYSICIAN ASSISTANT

## 2020-01-01 PROCEDURE — G8482 FLU IMMUNIZE ORDER/ADMIN: HCPCS | Performed by: FAMILY MEDICINE

## 2020-01-01 PROCEDURE — 80307 DRUG TEST PRSMV CHEM ANLYZR: CPT

## 2020-01-01 PROCEDURE — 82728 ASSAY OF FERRITIN: CPT

## 2020-01-01 PROCEDURE — 74176 CT ABD & PELVIS W/O CONTRAST: CPT

## 2020-01-01 PROCEDURE — 85018 HEMOGLOBIN: CPT

## 2020-01-01 PROCEDURE — 83935 ASSAY OF URINE OSMOLALITY: CPT

## 2020-01-01 PROCEDURE — 4040F PNEUMOC VAC/ADMIN/RCVD: CPT | Performed by: PHYSICIAN ASSISTANT

## 2020-01-01 PROCEDURE — 1123F ACP DISCUSS/DSCN MKR DOCD: CPT | Performed by: PHYSICIAN ASSISTANT

## 2020-01-01 PROCEDURE — 2580000003 HC RX 258: Performed by: FAMILY MEDICINE

## 2020-01-01 PROCEDURE — 6360000002 HC RX W HCPCS: Performed by: SPECIALIST

## 2020-01-01 PROCEDURE — 90471 IMMUNIZATION ADMIN: CPT | Performed by: SPECIALIST

## 2020-01-01 PROCEDURE — 90744 HEPB VACC 3 DOSE PED/ADOL IM: CPT | Performed by: SPECIALIST

## 2020-01-01 PROCEDURE — C1769 GUIDE WIRE: HCPCS | Performed by: ORTHOPAEDIC SURGERY

## 2020-01-01 PROCEDURE — 93005 ELECTROCARDIOGRAM TRACING: CPT | Performed by: PHYSICIAN ASSISTANT

## 2020-01-01 PROCEDURE — 87070 CULTURE OTHR SPECIMN AEROBIC: CPT

## 2020-01-01 PROCEDURE — 6360000002 HC RX W HCPCS: Performed by: NURSE PRACTITIONER

## 2020-01-01 PROCEDURE — 83550 IRON BINDING TEST: CPT

## 2020-01-01 PROCEDURE — C1713 ANCHOR/SCREW BN/BN,TIS/BN: HCPCS | Performed by: ORTHOPAEDIC SURGERY

## 2020-01-01 PROCEDURE — 6370000000 HC RX 637 (ALT 250 FOR IP): Performed by: PHYSICIAN ASSISTANT

## 2020-01-01 PROCEDURE — 93005 ELECTROCARDIOGRAM TRACING: CPT | Performed by: HOSPITALIST

## 2020-01-01 PROCEDURE — 82570 ASSAY OF URINE CREATININE: CPT

## 2020-01-01 PROCEDURE — 99284 EMERGENCY DEPT VISIT MOD MDM: CPT

## 2020-01-01 PROCEDURE — 87077 CULTURE AEROBIC IDENTIFY: CPT

## 2020-01-01 PROCEDURE — 2580000003 HC RX 258: Performed by: NURSE ANESTHETIST, CERTIFIED REGISTERED

## 2020-01-01 PROCEDURE — 84484 ASSAY OF TROPONIN QUANT: CPT

## 2020-01-01 PROCEDURE — 7100000010 HC PHASE II RECOVERY - FIRST 15 MIN

## 2020-01-01 PROCEDURE — 84132 ASSAY OF SERUM POTASSIUM: CPT

## 2020-01-01 PROCEDURE — 99214 OFFICE O/P EST MOD 30 MIN: CPT | Performed by: PHYSICIAN ASSISTANT

## 2020-01-01 PROCEDURE — 1090F PRES/ABSN URINE INCON ASSESS: CPT | Performed by: PHYSICIAN ASSISTANT

## 2020-01-01 PROCEDURE — 99222 1ST HOSP IP/OBS MODERATE 55: CPT | Performed by: OBSTETRICS & GYNECOLOGY

## 2020-01-01 PROCEDURE — 99214 OFFICE O/P EST MOD 30 MIN: CPT | Performed by: FAMILY MEDICINE

## 2020-01-01 PROCEDURE — 73030 X-RAY EXAM OF SHOULDER: CPT

## 2020-01-01 PROCEDURE — 2709999900 HC NON-CHARGEABLE SUPPLY: Performed by: ORTHOPAEDIC SURGERY

## 2020-01-01 PROCEDURE — 83540 ASSAY OF IRON: CPT

## 2020-01-01 PROCEDURE — 3600000004 HC SURGERY LEVEL 4 BASE: Performed by: ORTHOPAEDIC SURGERY

## 2020-01-01 PROCEDURE — 2500000003 HC RX 250 WO HCPCS: Performed by: EMERGENCY MEDICINE

## 2020-01-01 PROCEDURE — 99495 TRANSJ CARE MGMT MOD F2F 14D: CPT | Performed by: PHYSICIAN ASSISTANT

## 2020-01-01 PROCEDURE — 2780000010 HC IMPLANT OTHER: Performed by: ORTHOPAEDIC SURGERY

## 2020-01-01 PROCEDURE — 86038 ANTINUCLEAR ANTIBODIES: CPT

## 2020-01-01 PROCEDURE — 2580000003 HC RX 258: Performed by: ANESTHESIOLOGY

## 2020-01-01 PROCEDURE — 76705 ECHO EXAM OF ABDOMEN: CPT

## 2020-01-01 PROCEDURE — 76000 FLUOROSCOPY <1 HR PHYS/QHP: CPT

## 2020-01-01 PROCEDURE — 84165 PROTEIN E-PHORESIS SERUM: CPT

## 2020-01-01 PROCEDURE — G8399 PT W/DXA RESULTS DOCUMENT: HCPCS | Performed by: FAMILY MEDICINE

## 2020-01-01 PROCEDURE — 86900 BLOOD TYPING SEROLOGIC ABO: CPT

## 2020-01-01 PROCEDURE — 83516 IMMUNOASSAY NONANTIBODY: CPT

## 2020-01-01 PROCEDURE — 86320 SERUM IMMUNOELECTROPHORESIS: CPT

## 2020-01-01 PROCEDURE — 90632 HEPA VACCINE ADULT IM: CPT | Performed by: SPECIALIST

## 2020-01-01 PROCEDURE — 6360000002 HC RX W HCPCS

## 2020-01-01 PROCEDURE — G8420 CALC BMI NORM PARAMETERS: HCPCS | Performed by: FAMILY MEDICINE

## 2020-01-01 PROCEDURE — 82105 ALPHA-FETOPROTEIN SERUM: CPT

## 2020-01-01 DEVICE — SCREW BNE L36MM DIA5MM TIB LT GRN TI ST CANN LOK FULL THRD: Type: IMPLANTABLE DEVICE | Site: HIP | Status: FUNCTIONAL

## 2020-01-01 DEVICE — IMPLANTABLE DEVICE: Type: IMPLANTABLE DEVICE | Site: HIP | Status: FUNCTIONAL

## 2020-01-01 RX ORDER — METOPROLOL TARTRATE 50 MG/1
100 TABLET, FILM COATED ORAL 2 TIMES DAILY
Status: DISCONTINUED | OUTPATIENT
Start: 2020-01-01 | End: 2020-01-01 | Stop reason: HOSPADM

## 2020-01-01 RX ORDER — SPIRONOLACTONE 25 MG/1
25 TABLET ORAL DAILY
Qty: 30 TABLET | Refills: 3 | Status: ON HOLD | DISCHARGE
Start: 2020-01-01 | End: 2020-01-01 | Stop reason: HOSPADM

## 2020-01-01 RX ORDER — NITROFURANTOIN 25; 75 MG/1; MG/1
100 CAPSULE ORAL 2 TIMES DAILY
Qty: 14 CAPSULE | Refills: 0 | Status: SHIPPED | OUTPATIENT
Start: 2020-01-01 | End: 2020-01-01 | Stop reason: ALTCHOICE

## 2020-01-01 RX ORDER — SPIRONOLACTONE 25 MG/1
25 TABLET ORAL DAILY
Qty: 30 TABLET | Refills: 3 | Status: SHIPPED | OUTPATIENT
Start: 2020-01-01 | End: 2020-01-01

## 2020-01-01 RX ORDER — LACTULOSE 10 G/15ML
30 SOLUTION ORAL DAILY
Status: DISCONTINUED | OUTPATIENT
Start: 2020-01-01 | End: 2020-01-01 | Stop reason: HOSPADM

## 2020-01-01 RX ORDER — SPIRONOLACTONE 25 MG/1
25 TABLET ORAL DAILY
Qty: 30 TABLET | Refills: 2 | OUTPATIENT
Start: 2020-01-01

## 2020-01-01 RX ORDER — PROPOFOL 10 MG/ML
INJECTION, EMULSION INTRAVENOUS CONTINUOUS PRN
Status: DISCONTINUED | OUTPATIENT
Start: 2020-01-01 | End: 2020-01-01 | Stop reason: SDUPTHER

## 2020-01-01 RX ORDER — MAGNESIUM SULFATE 1 G/100ML
1 INJECTION INTRAVENOUS ONCE
Status: COMPLETED | OUTPATIENT
Start: 2020-01-01 | End: 2020-01-01

## 2020-01-01 RX ORDER — SPIRONOLACTONE 25 MG/1
25 TABLET ORAL DAILY
Status: DISCONTINUED | OUTPATIENT
Start: 2020-01-01 | End: 2020-01-01 | Stop reason: HOSPADM

## 2020-01-01 RX ORDER — MAGNESIUM OXIDE 400 MG/1
400 TABLET ORAL DAILY
Qty: 30 TABLET | Refills: 1 | DISCHARGE
Start: 2020-01-01

## 2020-01-01 RX ORDER — LANOLIN ALCOHOL/MO/W.PET/CERES
3 CREAM (GRAM) TOPICAL NIGHTLY PRN
Status: DISCONTINUED | OUTPATIENT
Start: 2020-01-01 | End: 2020-01-01

## 2020-01-01 RX ORDER — PROMETHAZINE HYDROCHLORIDE 25 MG/1
12.5 TABLET ORAL EVERY 6 HOURS PRN
Status: DISCONTINUED | OUTPATIENT
Start: 2020-01-01 | End: 2020-01-01 | Stop reason: HOSPADM

## 2020-01-01 RX ORDER — LANOLIN ALCOHOL/MO/W.PET/CERES
400 CREAM (GRAM) TOPICAL 2 TIMES DAILY
Qty: 60 TABLET | Refills: 1 | Status: SHIPPED | OUTPATIENT
Start: 2020-01-01 | End: 2020-01-01 | Stop reason: DRUGHIGH

## 2020-01-01 RX ORDER — DIPHENHYDRAMINE HYDROCHLORIDE 50 MG/ML
50 INJECTION INTRAMUSCULAR; INTRAVENOUS ONCE
Status: COMPLETED | OUTPATIENT
Start: 2020-01-01 | End: 2020-01-01

## 2020-01-01 RX ORDER — ALBUMIN (HUMAN) 12.5 G/50ML
25 SOLUTION INTRAVENOUS ONCE
Status: COMPLETED | OUTPATIENT
Start: 2020-01-01 | End: 2020-01-01

## 2020-01-01 RX ORDER — OCTREOTIDE ACETATE 100 UG/ML
100 INJECTION, SOLUTION INTRAVENOUS; SUBCUTANEOUS EVERY 8 HOURS
Status: DISCONTINUED | OUTPATIENT
Start: 2020-01-01 | End: 2020-01-01

## 2020-01-01 RX ORDER — LACTULOSE 10 G/15ML
20 SOLUTION ORAL 3 TIMES DAILY
Refills: 1 | DISCHARGE
Start: 2020-01-01 | End: 2020-01-01

## 2020-01-01 RX ORDER — DONEPEZIL HYDROCHLORIDE 10 MG/1
10 TABLET, FILM COATED ORAL NIGHTLY
Status: DISCONTINUED | OUTPATIENT
Start: 2020-01-01 | End: 2020-01-01 | Stop reason: HOSPADM

## 2020-01-01 RX ORDER — DEXAMETHASONE SODIUM PHOSPHATE 4 MG/ML
INJECTION, SOLUTION INTRA-ARTICULAR; INTRALESIONAL; INTRAMUSCULAR; INTRAVENOUS; SOFT TISSUE PRN
Status: DISCONTINUED | OUTPATIENT
Start: 2020-01-01 | End: 2020-01-01 | Stop reason: SDUPTHER

## 2020-01-01 RX ORDER — OXYCODONE HYDROCHLORIDE AND ACETAMINOPHEN 5; 325 MG/1; MG/1
1 TABLET ORAL EVERY 6 HOURS PRN
Status: DISCONTINUED | OUTPATIENT
Start: 2020-01-01 | End: 2020-01-01 | Stop reason: HOSPADM

## 2020-01-01 RX ORDER — AMLODIPINE BESYLATE 5 MG/1
5 TABLET ORAL DAILY
Status: DISCONTINUED | OUTPATIENT
Start: 2020-01-01 | End: 2020-01-01

## 2020-01-01 RX ORDER — POLYETHYLENE GLYCOL 3350 17 G/17G
17 POWDER, FOR SOLUTION ORAL DAILY PRN
Status: DISCONTINUED | OUTPATIENT
Start: 2020-01-01 | End: 2020-01-01 | Stop reason: HOSPADM

## 2020-01-01 RX ORDER — SODIUM CHLORIDE 0.9 % (FLUSH) 0.9 %
10 SYRINGE (ML) INJECTION PRN
Status: DISCONTINUED | OUTPATIENT
Start: 2020-01-01 | End: 2020-01-01 | Stop reason: HOSPADM

## 2020-01-01 RX ORDER — LACTULOSE 10 G/15ML
20 SOLUTION ORAL 3 TIMES DAILY
Qty: 2700 ML | Refills: 1 | Status: SHIPPED | OUTPATIENT
Start: 2020-01-01 | End: 2020-01-01

## 2020-01-01 RX ORDER — DEXTROSE MONOHYDRATE 25 G/50ML
25 INJECTION, SOLUTION INTRAVENOUS ONCE
Status: COMPLETED | OUTPATIENT
Start: 2020-01-01 | End: 2020-01-01

## 2020-01-01 RX ORDER — SODIUM CHLORIDE, SODIUM LACTATE, POTASSIUM CHLORIDE, CALCIUM CHLORIDE 600; 310; 30; 20 MG/100ML; MG/100ML; MG/100ML; MG/100ML
INJECTION, SOLUTION INTRAVENOUS ONCE
Status: COMPLETED | OUTPATIENT
Start: 2020-01-01 | End: 2020-01-01

## 2020-01-01 RX ORDER — CEFAZOLIN SODIUM 1 G/50ML
1 INJECTION, SOLUTION INTRAVENOUS EVERY 8 HOURS
Status: COMPLETED | OUTPATIENT
Start: 2020-01-01 | End: 2020-01-01

## 2020-01-01 RX ORDER — SPIRONOLACTONE 25 MG/1
25 TABLET ORAL DAILY
Status: DISCONTINUED | OUTPATIENT
Start: 2020-01-01 | End: 2020-01-01

## 2020-01-01 RX ORDER — SPIRONOLACTONE 50 MG/1
50 TABLET, FILM COATED ORAL DAILY
Status: DISCONTINUED | OUTPATIENT
Start: 2020-01-01 | End: 2020-01-01

## 2020-01-01 RX ORDER — ONDANSETRON 2 MG/ML
INJECTION INTRAMUSCULAR; INTRAVENOUS PRN
Status: DISCONTINUED | OUTPATIENT
Start: 2020-01-01 | End: 2020-01-01 | Stop reason: SDUPTHER

## 2020-01-01 RX ORDER — TORSEMIDE 20 MG/1
20 TABLET ORAL 2 TIMES DAILY
Status: DISCONTINUED | OUTPATIENT
Start: 2020-01-01 | End: 2020-01-01 | Stop reason: HOSPADM

## 2020-01-01 RX ORDER — ACETAMINOPHEN 325 MG/1
650 TABLET ORAL EVERY 6 HOURS PRN
Status: DISCONTINUED | OUTPATIENT
Start: 2020-01-01 | End: 2020-01-01 | Stop reason: HOSPADM

## 2020-01-01 RX ORDER — MIDODRINE HYDROCHLORIDE 5 MG/1
5 TABLET ORAL 3 TIMES DAILY
Status: DISCONTINUED | OUTPATIENT
Start: 2020-01-01 | End: 2020-01-01

## 2020-01-01 RX ORDER — LANOLIN ALCOHOL/MO/W.PET/CERES
400 CREAM (GRAM) TOPICAL 2 TIMES DAILY
Status: DISCONTINUED | OUTPATIENT
Start: 2020-01-01 | End: 2020-01-01 | Stop reason: HOSPADM

## 2020-01-01 RX ORDER — FUROSEMIDE 40 MG/1
40 TABLET ORAL DAILY
Qty: 60 TABLET | Refills: 3 | DISCHARGE
Start: 2020-01-01 | End: 2020-01-01

## 2020-01-01 RX ORDER — SODIUM CHLORIDE 0.9 % (FLUSH) 0.9 %
10 SYRINGE (ML) INJECTION EVERY 12 HOURS SCHEDULED
Status: DISCONTINUED | OUTPATIENT
Start: 2020-01-01 | End: 2020-01-01 | Stop reason: HOSPADM

## 2020-01-01 RX ORDER — LORAZEPAM 2 MG/ML
0.5 INJECTION INTRAMUSCULAR EVERY 4 HOURS PRN
Status: DISCONTINUED | OUTPATIENT
Start: 2020-01-01 | End: 2020-01-01 | Stop reason: HOSPADM

## 2020-01-01 RX ORDER — FUROSEMIDE 40 MG/1
40 TABLET ORAL DAILY
Qty: 60 TABLET | Refills: 3 | Status: ON HOLD | OUTPATIENT
Start: 2020-01-01 | End: 2020-01-01 | Stop reason: HOSPADM

## 2020-01-01 RX ORDER — SODIUM CHLORIDE 0.9 % (FLUSH) 0.9 %
10 SYRINGE (ML) INJECTION 2 TIMES DAILY
Status: DISCONTINUED | OUTPATIENT
Start: 2020-01-01 | End: 2020-01-01 | Stop reason: HOSPADM

## 2020-01-01 RX ORDER — LACTULOSE 10 G/15ML
30 SOLUTION ORAL EVERY 6 HOURS SCHEDULED
Status: DISCONTINUED | OUTPATIENT
Start: 2020-01-01 | End: 2020-01-01 | Stop reason: HOSPADM

## 2020-01-01 RX ORDER — FUROSEMIDE 40 MG/1
40 TABLET ORAL 2 TIMES DAILY
Status: DISCONTINUED | OUTPATIENT
Start: 2020-01-01 | End: 2020-01-01

## 2020-01-01 RX ORDER — TORSEMIDE 20 MG/1
20 TABLET ORAL 2 TIMES DAILY
Status: DISCONTINUED | OUTPATIENT
Start: 2020-01-01 | End: 2020-01-01

## 2020-01-01 RX ORDER — TORSEMIDE 20 MG/1
20 TABLET ORAL 2 TIMES DAILY
Qty: 60 TABLET | Refills: 3 | Status: ON HOLD | OUTPATIENT
Start: 2020-01-01 | End: 2020-01-01 | Stop reason: SDUPTHER

## 2020-01-01 RX ORDER — ONDANSETRON 2 MG/ML
4 INJECTION INTRAMUSCULAR; INTRAVENOUS EVERY 6 HOURS PRN
Status: DISCONTINUED | OUTPATIENT
Start: 2020-01-01 | End: 2020-01-01 | Stop reason: HOSPADM

## 2020-01-01 RX ORDER — SODIUM CHLORIDE 9 MG/ML
INJECTION, SOLUTION INTRAVENOUS CONTINUOUS
Status: CANCELLED | OUTPATIENT
Start: 2020-01-01

## 2020-01-01 RX ORDER — DEXTROSE MONOHYDRATE 50 MG/ML
100 INJECTION, SOLUTION INTRAVENOUS PRN
Status: DISCONTINUED | OUTPATIENT
Start: 2020-01-01 | End: 2020-01-01 | Stop reason: HOSPADM

## 2020-01-01 RX ORDER — SPIRONOLACTONE 25 MG/1
50 TABLET ORAL 2 TIMES DAILY
Status: DISCONTINUED | OUTPATIENT
Start: 2020-01-01 | End: 2020-01-01

## 2020-01-01 RX ORDER — FUROSEMIDE 10 MG/ML
20 INJECTION INTRAMUSCULAR; INTRAVENOUS 2 TIMES DAILY
Status: DISCONTINUED | OUTPATIENT
Start: 2020-01-01 | End: 2020-01-01 | Stop reason: HOSPADM

## 2020-01-01 RX ORDER — SPIRONOLACTONE 100 MG/1
100 TABLET, FILM COATED ORAL DAILY
Qty: 30 TABLET | Refills: 3 | DISCHARGE
Start: 2020-01-01 | End: 2020-01-01

## 2020-01-01 RX ORDER — SODIUM POLYSTYRENE SULFONATE 15 G/60ML
15 SUSPENSION ORAL; RECTAL ONCE
Status: COMPLETED | OUTPATIENT
Start: 2020-01-01 | End: 2020-01-01

## 2020-01-01 RX ORDER — QUETIAPINE FUMARATE 25 MG/1
50 TABLET, FILM COATED ORAL NIGHTLY
Status: DISCONTINUED | OUTPATIENT
Start: 2020-01-01 | End: 2020-01-01 | Stop reason: HOSPADM

## 2020-01-01 RX ORDER — FUROSEMIDE 10 MG/ML
20 INJECTION INTRAMUSCULAR; INTRAVENOUS ONCE
Status: COMPLETED | OUTPATIENT
Start: 2020-01-01 | End: 2020-01-01

## 2020-01-01 RX ORDER — LANOLIN ALCOHOL/MO/W.PET/CERES
400 CREAM (GRAM) TOPICAL 2 TIMES DAILY
Qty: 30 TABLET | Refills: 1 | Status: SHIPPED | OUTPATIENT
Start: 2020-01-01 | End: 2020-01-01

## 2020-01-01 RX ORDER — FENTANYL CITRATE 50 UG/ML
25 INJECTION, SOLUTION INTRAMUSCULAR; INTRAVENOUS EVERY 5 MIN PRN
Status: DISCONTINUED | OUTPATIENT
Start: 2020-01-01 | End: 2020-01-01 | Stop reason: HOSPADM

## 2020-01-01 RX ORDER — TORSEMIDE 10 MG/1
20 TABLET ORAL DAILY
Status: DISCONTINUED | OUTPATIENT
Start: 2020-01-01 | End: 2020-01-01

## 2020-01-01 RX ORDER — ALBUMIN (HUMAN) 12.5 G/50ML
50 SOLUTION INTRAVENOUS ONCE
Status: COMPLETED | OUTPATIENT
Start: 2020-01-01 | End: 2020-01-01

## 2020-01-01 RX ORDER — OXYCODONE HYDROCHLORIDE AND ACETAMINOPHEN 5; 325 MG/1; MG/1
1 TABLET ORAL EVERY 6 HOURS PRN
Qty: 10 TABLET | Refills: 0 | Status: SHIPPED | OUTPATIENT
Start: 2020-01-01 | End: 2020-01-01

## 2020-01-01 RX ORDER — ROCURONIUM BROMIDE 10 MG/ML
INJECTION, SOLUTION INTRAVENOUS PRN
Status: DISCONTINUED | OUTPATIENT
Start: 2020-01-01 | End: 2020-01-01 | Stop reason: SDUPTHER

## 2020-01-01 RX ORDER — LOSARTAN POTASSIUM 100 MG/1
100 TABLET ORAL DAILY
Status: DISCONTINUED | OUTPATIENT
Start: 2020-01-01 | End: 2020-01-01 | Stop reason: HOSPADM

## 2020-01-01 RX ORDER — TORSEMIDE 20 MG/1
20 TABLET ORAL DAILY
Status: DISCONTINUED | OUTPATIENT
Start: 2020-01-01 | End: 2020-01-01 | Stop reason: HOSPADM

## 2020-01-01 RX ORDER — ZIPRASIDONE MESYLATE 20 MG/ML
20 INJECTION, POWDER, LYOPHILIZED, FOR SOLUTION INTRAMUSCULAR ONCE
Status: COMPLETED | OUTPATIENT
Start: 2020-01-01 | End: 2020-01-01

## 2020-01-01 RX ORDER — ACETAMINOPHEN 650 MG/1
650 SUPPOSITORY RECTAL EVERY 6 HOURS PRN
Status: DISCONTINUED | OUTPATIENT
Start: 2020-01-01 | End: 2020-01-01 | Stop reason: HOSPADM

## 2020-01-01 RX ORDER — QUETIAPINE FUMARATE 50 MG/1
50 TABLET, FILM COATED ORAL NIGHTLY
Qty: 90 TABLET | Refills: 3 | Status: SHIPPED | OUTPATIENT
Start: 2020-01-01

## 2020-01-01 RX ORDER — METOPROLOL TARTRATE 50 MG/1
50 TABLET, FILM COATED ORAL 2 TIMES DAILY
Status: DISCONTINUED | OUTPATIENT
Start: 2020-01-01 | End: 2020-01-01

## 2020-01-01 RX ORDER — SPIRONOLACTONE 50 MG/1
50 TABLET, FILM COATED ORAL DAILY
Qty: 60 TABLET | Refills: 2 | Status: SHIPPED | OUTPATIENT
Start: 2020-01-01 | End: 2020-01-01

## 2020-01-01 RX ORDER — PROPOFOL 10 MG/ML
INJECTION, EMULSION INTRAVENOUS PRN
Status: DISCONTINUED | OUTPATIENT
Start: 2020-01-01 | End: 2020-01-01 | Stop reason: SDUPTHER

## 2020-01-01 RX ORDER — 0.9 % SODIUM CHLORIDE 0.9 %
250 INTRAVENOUS SOLUTION INTRAVENOUS ONCE
Status: DISCONTINUED | OUTPATIENT
Start: 2020-01-01 | End: 2020-01-01 | Stop reason: HOSPADM

## 2020-01-01 RX ORDER — LACTULOSE 10 G/15ML
20 SOLUTION ORAL ONCE
Status: COMPLETED | OUTPATIENT
Start: 2020-01-01 | End: 2020-01-01

## 2020-01-01 RX ORDER — FUROSEMIDE 40 MG/1
40 TABLET ORAL DAILY
Status: DISCONTINUED | OUTPATIENT
Start: 2020-01-01 | End: 2020-01-01 | Stop reason: HOSPADM

## 2020-01-01 RX ORDER — MAGNESIUM OXIDE 400 MG/1
400 TABLET ORAL DAILY
Status: DISCONTINUED | OUTPATIENT
Start: 2020-01-01 | End: 2020-01-01 | Stop reason: HOSPADM

## 2020-01-01 RX ORDER — NITROFURANTOIN 25; 75 MG/1; MG/1
100 CAPSULE ORAL 2 TIMES DAILY
Qty: 14 CAPSULE | Refills: 0 | Status: SHIPPED | OUTPATIENT
Start: 2020-01-01 | End: 2020-01-01 | Stop reason: SDUPTHER

## 2020-01-01 RX ORDER — SODIUM CHLORIDE 9 MG/ML
INJECTION, SOLUTION INTRAVENOUS CONTINUOUS
Status: DISCONTINUED | OUTPATIENT
Start: 2020-01-01 | End: 2020-01-01

## 2020-01-01 RX ORDER — ASPIRIN 81 MG/1
81 TABLET, CHEWABLE ORAL DAILY
Status: DISCONTINUED | OUTPATIENT
Start: 2020-01-01 | End: 2020-01-01 | Stop reason: HOSPADM

## 2020-01-01 RX ORDER — DONEPEZIL HYDROCHLORIDE 10 MG/1
10 TABLET, FILM COATED ORAL NIGHTLY
Qty: 90 TABLET | Refills: 1 | Status: SHIPPED | OUTPATIENT
Start: 2020-01-01

## 2020-01-01 RX ORDER — ONDANSETRON 2 MG/ML
4 INJECTION INTRAMUSCULAR; INTRAVENOUS EVERY 30 MIN PRN
Status: DISCONTINUED | OUTPATIENT
Start: 2020-01-01 | End: 2020-01-01 | Stop reason: SDUPTHER

## 2020-01-01 RX ORDER — 0.9 % SODIUM CHLORIDE 0.9 %
500 INTRAVENOUS SOLUTION INTRAVENOUS ONCE
Status: COMPLETED | OUTPATIENT
Start: 2020-01-01 | End: 2020-01-01

## 2020-01-01 RX ORDER — NICOTINE POLACRILEX 4 MG
15 LOZENGE BUCCAL PRN
Status: DISCONTINUED | OUTPATIENT
Start: 2020-01-01 | End: 2020-01-01 | Stop reason: HOSPADM

## 2020-01-01 RX ORDER — SODIUM CHLORIDE AND POTASSIUM CHLORIDE .9; .15 G/100ML; G/100ML
SOLUTION INTRAVENOUS CONTINUOUS
Status: DISCONTINUED | OUTPATIENT
Start: 2020-01-01 | End: 2020-01-01

## 2020-01-01 RX ORDER — SPIRONOLACTONE 100 MG/1
100 TABLET, FILM COATED ORAL DAILY
Qty: 30 TABLET | Refills: 3 | Status: ON HOLD | OUTPATIENT
Start: 2020-01-01 | End: 2020-01-01 | Stop reason: HOSPADM

## 2020-01-01 RX ORDER — AMLODIPINE BESYLATE 5 MG/1
5 TABLET ORAL DAILY
Qty: 90 TABLET | Refills: 3 | Status: ON HOLD | OUTPATIENT
Start: 2020-01-01 | End: 2020-01-01 | Stop reason: HOSPADM

## 2020-01-01 RX ORDER — MORPHINE SULFATE 4 MG/ML
2 INJECTION, SOLUTION INTRAMUSCULAR; INTRAVENOUS
Status: DISCONTINUED | OUTPATIENT
Start: 2020-01-01 | End: 2020-01-01 | Stop reason: HOSPADM

## 2020-01-01 RX ORDER — MORPHINE SULFATE 4 MG/ML
4 INJECTION, SOLUTION INTRAMUSCULAR; INTRAVENOUS
Status: DISCONTINUED | OUTPATIENT
Start: 2020-01-01 | End: 2020-01-01 | Stop reason: HOSPADM

## 2020-01-01 RX ORDER — LACTULOSE 10 G/15ML
20 SOLUTION ORAL 3 TIMES DAILY
Status: DISCONTINUED | OUTPATIENT
Start: 2020-01-01 | End: 2020-01-01 | Stop reason: HOSPADM

## 2020-01-01 RX ORDER — CEFAZOLIN SODIUM 2 G/100ML
2 INJECTION, SOLUTION INTRAVENOUS ONCE
Status: COMPLETED | OUTPATIENT
Start: 2020-01-01 | End: 2020-01-01

## 2020-01-01 RX ORDER — MAGNESIUM SULFATE 4 G/50ML
4 INJECTION INTRAVENOUS ONCE
Status: COMPLETED | OUTPATIENT
Start: 2020-01-01 | End: 2020-01-01

## 2020-01-01 RX ORDER — DEXTROSE MONOHYDRATE 25 G/50ML
12.5 INJECTION, SOLUTION INTRAVENOUS PRN
Status: DISCONTINUED | OUTPATIENT
Start: 2020-01-01 | End: 2020-01-01 | Stop reason: HOSPADM

## 2020-01-01 RX ORDER — LANOLIN ALCOHOL/MO/W.PET/CERES
400 CREAM (GRAM) TOPICAL DAILY
Status: DISCONTINUED | OUTPATIENT
Start: 2020-01-01 | End: 2020-01-01 | Stop reason: SDUPTHER

## 2020-01-01 RX ORDER — EPHEDRINE SULFATE 50 MG/ML
INJECTION INTRAVENOUS PRN
Status: DISCONTINUED | OUTPATIENT
Start: 2020-01-01 | End: 2020-01-01 | Stop reason: SDUPTHER

## 2020-01-01 RX ORDER — HEPARIN SODIUM 5000 [USP'U]/ML
5000 INJECTION, SOLUTION INTRAVENOUS; SUBCUTANEOUS EVERY 8 HOURS SCHEDULED
Status: DISCONTINUED | OUTPATIENT
Start: 2020-01-01 | End: 2020-01-01 | Stop reason: HOSPADM

## 2020-01-01 RX ORDER — POTASSIUM CHLORIDE 20 MEQ/1
20 TABLET, EXTENDED RELEASE ORAL 2 TIMES DAILY
Qty: 180 TABLET | Refills: 1 | DISCHARGE
Start: 2020-01-01 | End: 2020-01-01

## 2020-01-01 RX ORDER — FUROSEMIDE 10 MG/ML
INJECTION INTRAMUSCULAR; INTRAVENOUS
Status: COMPLETED
Start: 2020-01-01 | End: 2020-01-01

## 2020-01-01 RX ORDER — DIPHENHYDRAMINE HCL 25 MG
50 TABLET ORAL ONCE
Status: COMPLETED | OUTPATIENT
Start: 2020-01-01 | End: 2020-01-01

## 2020-01-01 RX ORDER — LANOLIN ALCOHOL/MO/W.PET/CERES
3 CREAM (GRAM) TOPICAL NIGHTLY PRN
Refills: 3 | DISCHARGE
Start: 2020-01-01 | End: 2020-01-01 | Stop reason: HOSPADM

## 2020-01-01 RX ORDER — METOPROLOL TARTRATE 50 MG/1
50 TABLET, FILM COATED ORAL 2 TIMES DAILY
Qty: 60 TABLET | Refills: 1 | Status: SHIPPED | OUTPATIENT
Start: 2020-01-01 | End: 2020-01-01

## 2020-01-01 RX ORDER — TORSEMIDE 20 MG/1
20 TABLET ORAL DAILY
Qty: 30 TABLET | Refills: 3 | Status: CANCELLED | DISCHARGE
Start: 2020-01-01

## 2020-01-01 RX ORDER — HYDRALAZINE HYDROCHLORIDE 20 MG/ML
5 INJECTION INTRAMUSCULAR; INTRAVENOUS EVERY 10 MIN PRN
Status: DISCONTINUED | OUTPATIENT
Start: 2020-01-01 | End: 2020-01-01 | Stop reason: HOSPADM

## 2020-01-01 RX ORDER — SODIUM CHLORIDE 9 MG/ML
INJECTION, SOLUTION INTRAVENOUS ONCE
Status: DISCONTINUED | OUTPATIENT
Start: 2020-01-01 | End: 2020-01-01 | Stop reason: HOSPADM

## 2020-01-01 RX ORDER — TORSEMIDE 20 MG/1
20 TABLET ORAL DAILY
Qty: 60 TABLET | Refills: 2 | Status: SHIPPED | OUTPATIENT
Start: 2020-01-01 | End: 2020-01-01 | Stop reason: SDUPTHER

## 2020-01-01 RX ORDER — HALOPERIDOL 5 MG/ML
5 INJECTION INTRAMUSCULAR ONCE
Status: COMPLETED | OUTPATIENT
Start: 2020-01-01 | End: 2020-01-01

## 2020-01-01 RX ORDER — ASPIRIN 81 MG/1
81 TABLET, CHEWABLE ORAL DAILY
Qty: 30 TABLET | Refills: 3 | DISCHARGE
Start: 2020-01-01 | End: 2020-01-01

## 2020-01-01 RX ORDER — SULFAMETHOXAZOLE AND TRIMETHOPRIM 800; 160 MG/1; MG/1
1 TABLET ORAL 2 TIMES DAILY
Qty: 14 TABLET | Refills: 0 | Status: ON HOLD | OUTPATIENT
Start: 2020-01-01 | End: 2020-01-01

## 2020-01-01 RX ORDER — SODIUM CHLORIDE, SODIUM LACTATE, POTASSIUM CHLORIDE, CALCIUM CHLORIDE 600; 310; 30; 20 MG/100ML; MG/100ML; MG/100ML; MG/100ML
INJECTION, SOLUTION INTRAVENOUS CONTINUOUS PRN
Status: DISCONTINUED | OUTPATIENT
Start: 2020-01-01 | End: 2020-01-01 | Stop reason: SDUPTHER

## 2020-01-01 RX ORDER — PROMETHAZINE HYDROCHLORIDE 12.5 MG/1
12.5 TABLET ORAL EVERY 6 HOURS PRN
Status: DISCONTINUED | OUTPATIENT
Start: 2020-01-01 | End: 2020-01-01 | Stop reason: HOSPADM

## 2020-01-01 RX ORDER — ONDANSETRON 2 MG/ML
4 INJECTION INTRAMUSCULAR; INTRAVENOUS
Status: DISCONTINUED | OUTPATIENT
Start: 2020-01-01 | End: 2020-01-01 | Stop reason: HOSPADM

## 2020-01-01 RX ORDER — FENTANYL CITRATE 50 UG/ML
25 INJECTION, SOLUTION INTRAMUSCULAR; INTRAVENOUS ONCE
Status: COMPLETED | OUTPATIENT
Start: 2020-01-01 | End: 2020-01-01

## 2020-01-01 RX ORDER — DIPHENHYDRAMINE HYDROCHLORIDE 50 MG/ML
25 INJECTION INTRAMUSCULAR; INTRAVENOUS ONCE
Status: COMPLETED | OUTPATIENT
Start: 2020-01-01 | End: 2020-01-01

## 2020-01-01 RX ORDER — PHENYLEPHRINE HYDROCHLORIDE 10 MG/ML
INJECTION INTRAVENOUS PRN
Status: DISCONTINUED | OUTPATIENT
Start: 2020-01-01 | End: 2020-01-01 | Stop reason: SDUPTHER

## 2020-01-01 RX ORDER — SPIRONOLACTONE 25 MG/1
100 TABLET ORAL DAILY
Status: DISCONTINUED | OUTPATIENT
Start: 2020-01-01 | End: 2020-01-01 | Stop reason: HOSPADM

## 2020-01-01 RX ORDER — LANOLIN ALCOHOL/MO/W.PET/CERES
3 CREAM (GRAM) TOPICAL NIGHTLY PRN
Status: DISCONTINUED | OUTPATIENT
Start: 2020-01-01 | End: 2020-01-01 | Stop reason: HOSPADM

## 2020-01-01 RX ORDER — TORSEMIDE 20 MG/1
20 TABLET ORAL 2 TIMES DAILY
Qty: 30 TABLET | Refills: 3 | Status: SHIPPED | OUTPATIENT
Start: 2020-01-01 | End: 2020-01-01

## 2020-01-01 RX ORDER — SPIRONOLACTONE 50 MG/1
50 TABLET, FILM COATED ORAL 2 TIMES DAILY
Status: DISCONTINUED | OUTPATIENT
Start: 2020-01-01 | End: 2020-01-01

## 2020-01-01 RX ORDER — SODIUM CHLORIDE 9 MG/ML
INJECTION, SOLUTION INTRAVENOUS ONCE
Status: COMPLETED | OUTPATIENT
Start: 2020-01-01 | End: 2020-01-01

## 2020-01-01 RX ORDER — TORSEMIDE 20 MG/1
20 TABLET ORAL DAILY
Qty: 60 TABLET | Refills: 2 | Status: ON HOLD | DISCHARGE
Start: 2020-01-01 | End: 2020-01-01 | Stop reason: HOSPADM

## 2020-01-01 RX ORDER — FUROSEMIDE 10 MG/ML
40 INJECTION INTRAMUSCULAR; INTRAVENOUS ONCE
Status: COMPLETED | OUTPATIENT
Start: 2020-01-01 | End: 2020-01-01

## 2020-01-01 RX ORDER — METOPROLOL TARTRATE 50 MG/1
25 TABLET, FILM COATED ORAL 2 TIMES DAILY
Status: DISCONTINUED | OUTPATIENT
Start: 2020-01-01 | End: 2020-01-01

## 2020-01-01 RX ORDER — FUROSEMIDE 10 MG/ML
20 INJECTION INTRAMUSCULAR; INTRAVENOUS 2 TIMES DAILY
Status: DISCONTINUED | OUTPATIENT
Start: 2020-01-01 | End: 2020-01-01

## 2020-01-01 RX ORDER — FUROSEMIDE 20 MG/1
20 TABLET ORAL 2 TIMES DAILY
Qty: 60 TABLET | Refills: 3 | DISCHARGE
Start: 2020-01-01

## 2020-01-01 RX ADMIN — METOPROLOL TARTRATE 25 MG: 25 TABLET, FILM COATED ORAL at 21:15

## 2020-01-01 RX ADMIN — FENTANYL CITRATE 25 MCG: 50 INJECTION INTRAMUSCULAR; INTRAVENOUS at 06:49

## 2020-01-01 RX ADMIN — MIDODRINE HYDROCHLORIDE 5 MG: 5 TABLET ORAL at 18:55

## 2020-01-01 RX ADMIN — SODIUM CHLORIDE, PRESERVATIVE FREE 10 ML: 5 INJECTION INTRAVENOUS at 21:15

## 2020-01-01 RX ADMIN — ASPIRIN 81 MG CHEWABLE TABLET 81 MG: 81 TABLET CHEWABLE at 10:52

## 2020-01-01 RX ADMIN — METOPROLOL TARTRATE 25 MG: 25 TABLET, FILM COATED ORAL at 10:23

## 2020-01-01 RX ADMIN — METOPROLOL TARTRATE 25 MG: 50 TABLET, FILM COATED ORAL at 20:57

## 2020-01-01 RX ADMIN — POTASSIUM CHLORIDE AND SODIUM CHLORIDE: 900; 150 INJECTION, SOLUTION INTRAVENOUS at 10:29

## 2020-01-01 RX ADMIN — QUETIAPINE FUMARATE 50 MG: 25 TABLET ORAL at 23:42

## 2020-01-01 RX ADMIN — LACTULOSE 20 G: 10 SOLUTION ORAL at 09:51

## 2020-01-01 RX ADMIN — METOPROLOL TARTRATE 100 MG: 50 TABLET, FILM COATED ORAL at 09:15

## 2020-01-01 RX ADMIN — QUETIAPINE FUMARATE 50 MG: 25 TABLET ORAL at 20:56

## 2020-01-01 RX ADMIN — LACTULOSE 30 G: 10 SOLUTION ORAL at 06:26

## 2020-01-01 RX ADMIN — RIFAXIMIN 550 MG: 550 TABLET ORAL at 21:40

## 2020-01-01 RX ADMIN — SODIUM CHLORIDE, PRESERVATIVE FREE 10 ML: 5 INJECTION INTRAVENOUS at 09:00

## 2020-01-01 RX ADMIN — QUETIAPINE FUMARATE 50 MG: 25 TABLET ORAL at 20:46

## 2020-01-01 RX ADMIN — POTASSIUM CHLORIDE AND SODIUM CHLORIDE: 900; 150 INJECTION, SOLUTION INTRAVENOUS at 18:55

## 2020-01-01 RX ADMIN — SPIRONOLACTONE 50 MG: 25 TABLET ORAL at 09:15

## 2020-01-01 RX ADMIN — HEPARIN SODIUM 5000 UNITS: 5000 INJECTION, SOLUTION INTRAVENOUS; SUBCUTANEOUS at 20:46

## 2020-01-01 RX ADMIN — HEPARIN SODIUM 5000 UNITS: 5000 INJECTION, SOLUTION INTRAVENOUS; SUBCUTANEOUS at 06:06

## 2020-01-01 RX ADMIN — HEPARIN SODIUM 5000 UNITS: 5000 INJECTION, SOLUTION INTRAVENOUS; SUBCUTANEOUS at 15:03

## 2020-01-01 RX ADMIN — OCTREOTIDE ACETATE 100 MCG: 100 INJECTION, SOLUTION INTRAVENOUS; SUBCUTANEOUS at 08:43

## 2020-01-01 RX ADMIN — RIFAXIMIN 550 MG: 550 TABLET ORAL at 09:58

## 2020-01-01 RX ADMIN — FUROSEMIDE 40 MG: 40 TABLET ORAL at 08:29

## 2020-01-01 RX ADMIN — METOPROLOL TARTRATE 50 MG: 50 TABLET, FILM COATED ORAL at 09:06

## 2020-01-01 RX ADMIN — DONEPEZIL HYDROCHLORIDE 10 MG: 10 TABLET, FILM COATED ORAL at 21:29

## 2020-01-01 RX ADMIN — MAGNESIUM OXIDE 400 MG (241.3 MG MAGNESIUM) TABLET 400 MG: TABLET at 10:52

## 2020-01-01 RX ADMIN — LACTULOSE 20 G: 10 SOLUTION ORAL at 10:40

## 2020-01-01 RX ADMIN — METOPROLOL TARTRATE 100 MG: 50 TABLET, FILM COATED ORAL at 22:34

## 2020-01-01 RX ADMIN — DONEPEZIL HYDROCHLORIDE 10 MG: 10 TABLET, FILM COATED ORAL at 21:09

## 2020-01-01 RX ADMIN — HEPARIN SODIUM 5000 UNITS: 5000 INJECTION, SOLUTION INTRAVENOUS; SUBCUTANEOUS at 14:41

## 2020-01-01 RX ADMIN — LOSARTAN POTASSIUM 100 MG: 100 TABLET, FILM COATED ORAL at 10:00

## 2020-01-01 RX ADMIN — RIFAXIMIN 550 MG: 550 TABLET ORAL at 20:57

## 2020-01-01 RX ADMIN — SODIUM CHLORIDE, PRESERVATIVE FREE 10 ML: 5 INJECTION INTRAVENOUS at 21:41

## 2020-01-01 RX ADMIN — POTASSIUM CHLORIDE AND SODIUM CHLORIDE: 900; 150 INJECTION, SOLUTION INTRAVENOUS at 01:47

## 2020-01-01 RX ADMIN — SPIRONOLACTONE 100 MG: 25 TABLET ORAL at 09:15

## 2020-01-01 RX ADMIN — HEPARIN SODIUM 5000 UNITS: 5000 INJECTION, SOLUTION INTRAVENOUS; SUBCUTANEOUS at 20:11

## 2020-01-01 RX ADMIN — RIFAXIMIN 550 MG: 550 TABLET ORAL at 10:23

## 2020-01-01 RX ADMIN — HEPARIN SODIUM 5000 UNITS: 5000 INJECTION, SOLUTION INTRAVENOUS; SUBCUTANEOUS at 20:28

## 2020-01-01 RX ADMIN — METOPROLOL TARTRATE 25 MG: 50 TABLET, FILM COATED ORAL at 08:52

## 2020-01-01 RX ADMIN — LACTULOSE 20 G: 10 SOLUTION ORAL at 16:49

## 2020-01-01 RX ADMIN — SODIUM CHLORIDE, PRESERVATIVE FREE 10 ML: 5 INJECTION INTRAVENOUS at 08:41

## 2020-01-01 RX ADMIN — SPIRONOLACTONE 50 MG: 25 TABLET ORAL at 17:12

## 2020-01-01 RX ADMIN — SPIRONOLACTONE 25 MG: 25 TABLET ORAL at 09:51

## 2020-01-01 RX ADMIN — SODIUM CHLORIDE, PRESERVATIVE FREE 10 ML: 5 INJECTION INTRAVENOUS at 21:51

## 2020-01-01 RX ADMIN — RIFAXIMIN 550 MG: 550 TABLET ORAL at 21:50

## 2020-01-01 RX ADMIN — MAGNESIUM SULFATE 1 G: 1 INJECTION INTRAVENOUS at 18:32

## 2020-01-01 RX ADMIN — OCTREOTIDE ACETATE 100 MCG: 100 INJECTION, SOLUTION INTRAVENOUS; SUBCUTANEOUS at 01:53

## 2020-01-01 RX ADMIN — RIFAXIMIN 550 MG: 550 TABLET ORAL at 21:08

## 2020-01-01 RX ADMIN — DONEPEZIL HYDROCHLORIDE 10 MG: 10 TABLET, FILM COATED ORAL at 23:42

## 2020-01-01 RX ADMIN — QUETIAPINE FUMARATE 50 MG: 25 TABLET ORAL at 22:47

## 2020-01-01 RX ADMIN — LACTULOSE 30 G: 10 SOLUTION ORAL at 14:35

## 2020-01-01 RX ADMIN — QUETIAPINE FUMARATE 50 MG: 25 TABLET ORAL at 21:29

## 2020-01-01 RX ADMIN — METOPROLOL TARTRATE 100 MG: 50 TABLET, FILM COATED ORAL at 20:46

## 2020-01-01 RX ADMIN — METOPROLOL TARTRATE 100 MG: 50 TABLET, FILM COATED ORAL at 21:23

## 2020-01-01 RX ADMIN — SODIUM CHLORIDE 500 ML: 9 INJECTION, SOLUTION INTRAVENOUS at 21:31

## 2020-01-01 RX ADMIN — FUROSEMIDE 20 MG: 10 INJECTION, SOLUTION INTRAVENOUS at 08:43

## 2020-01-01 RX ADMIN — SODIUM CHLORIDE, PRESERVATIVE FREE 10 ML: 5 INJECTION INTRAVENOUS at 20:13

## 2020-01-01 RX ADMIN — DONEPEZIL HYDROCHLORIDE 10 MG: 10 TABLET, FILM COATED ORAL at 23:13

## 2020-01-01 RX ADMIN — METOPROLOL TARTRATE 100 MG: 50 TABLET, FILM COATED ORAL at 20:28

## 2020-01-01 RX ADMIN — PHENYLEPHRINE HYDROCHLORIDE 100 MCG: 10 INJECTION INTRAVENOUS at 09:42

## 2020-01-01 RX ADMIN — Medication 400 MG: at 10:25

## 2020-01-01 RX ADMIN — METOPROLOL TARTRATE 100 MG: 50 TABLET, FILM COATED ORAL at 21:54

## 2020-01-01 RX ADMIN — SODIUM CHLORIDE, PRESERVATIVE FREE 10 ML: 5 INJECTION INTRAVENOUS at 23:45

## 2020-01-01 RX ADMIN — ALBUMIN (HUMAN) 50 G: 0.25 INJECTION, SOLUTION INTRAVENOUS at 16:57

## 2020-01-01 RX ADMIN — DONEPEZIL HYDROCHLORIDE 10 MG: 10 TABLET, FILM COATED ORAL at 20:56

## 2020-01-01 RX ADMIN — SODIUM CHLORIDE, PRESERVATIVE FREE 10 ML: 5 INJECTION INTRAVENOUS at 21:29

## 2020-01-01 RX ADMIN — LACTULOSE 30 G: 10 SOLUTION ORAL at 13:17

## 2020-01-01 RX ADMIN — ASPIRIN 81 MG CHEWABLE TABLET 81 MG: 81 TABLET CHEWABLE at 08:48

## 2020-01-01 RX ADMIN — LACTULOSE 20 G: 10 SOLUTION ORAL at 23:27

## 2020-01-01 RX ADMIN — SODIUM CHLORIDE, PRESERVATIVE FREE 10 ML: 5 INJECTION INTRAVENOUS at 10:17

## 2020-01-01 RX ADMIN — DONEPEZIL HYDROCHLORIDE 10 MG: 10 TABLET, FILM COATED ORAL at 21:40

## 2020-01-01 RX ADMIN — TORSEMIDE 20 MG: 20 TABLET ORAL at 10:40

## 2020-01-01 RX ADMIN — LACTULOSE 20 G: 10 SOLUTION ORAL at 09:15

## 2020-01-01 RX ADMIN — SODIUM CHLORIDE, PRESERVATIVE FREE 10 ML: 5 INJECTION INTRAVENOUS at 09:59

## 2020-01-01 RX ADMIN — LACTULOSE 20 G: 10 SOLUTION ORAL at 22:47

## 2020-01-01 RX ADMIN — TORSEMIDE 20 MG: 20 TABLET ORAL at 10:22

## 2020-01-01 RX ADMIN — ZIPRASIDONE MESYLATE 20 MG: 20 INJECTION, POWDER, LYOPHILIZED, FOR SOLUTION INTRAMUSCULAR at 01:19

## 2020-01-01 RX ADMIN — SODIUM CHLORIDE, PRESERVATIVE FREE 10 ML: 5 INJECTION INTRAVENOUS at 10:16

## 2020-01-01 RX ADMIN — DONEPEZIL HYDROCHLORIDE 10 MG: 10 TABLET, FILM COATED ORAL at 20:12

## 2020-01-01 RX ADMIN — DONEPEZIL HYDROCHLORIDE 10 MG: 10 TABLET, FILM COATED ORAL at 20:46

## 2020-01-01 RX ADMIN — RIFAXIMIN 550 MG: 550 TABLET ORAL at 21:15

## 2020-01-01 RX ADMIN — RIFAXIMIN 550 MG: 550 TABLET ORAL at 08:48

## 2020-01-01 RX ADMIN — SODIUM CHLORIDE, PRESERVATIVE FREE 10 ML: 5 INJECTION INTRAVENOUS at 10:40

## 2020-01-01 RX ADMIN — ALBUMIN (HUMAN) 25 G: 0.25 INJECTION, SOLUTION INTRAVENOUS at 09:49

## 2020-01-01 RX ADMIN — SODIUM CHLORIDE, PRESERVATIVE FREE 10 ML: 5 INJECTION INTRAVENOUS at 21:10

## 2020-01-01 RX ADMIN — SODIUM CHLORIDE, PRESERVATIVE FREE 10 ML: 5 INJECTION INTRAVENOUS at 22:49

## 2020-01-01 RX ADMIN — METOPROLOL TARTRATE 100 MG: 50 TABLET, FILM COATED ORAL at 10:13

## 2020-01-01 RX ADMIN — SPIRONOLACTONE 25 MG: 25 TABLET ORAL at 08:48

## 2020-01-01 RX ADMIN — OCTREOTIDE ACETATE 100 MCG: 100 INJECTION, SOLUTION INTRAVENOUS; SUBCUTANEOUS at 16:57

## 2020-01-01 RX ADMIN — ENOXAPARIN SODIUM 40 MG: 40 INJECTION SUBCUTANEOUS at 15:49

## 2020-01-01 RX ADMIN — SODIUM CHLORIDE, PRESERVATIVE FREE 10 ML: 5 INJECTION INTRAVENOUS at 23:28

## 2020-01-01 RX ADMIN — QUETIAPINE FUMARATE 50 MG: 25 TABLET ORAL at 23:16

## 2020-01-01 RX ADMIN — TORSEMIDE 20 MG: 20 TABLET ORAL at 09:51

## 2020-01-01 RX ADMIN — SODIUM BICARBONATE 50 MEQ: 84 INJECTION INTRAVENOUS at 09:02

## 2020-01-01 RX ADMIN — ALBUMIN (HUMAN) 50 G: 0.25 INJECTION, SOLUTION INTRAVENOUS at 21:49

## 2020-01-01 RX ADMIN — METOPROLOL TARTRATE 50 MG: 50 TABLET, FILM COATED ORAL at 21:17

## 2020-01-01 RX ADMIN — LOSARTAN POTASSIUM 100 MG: 100 TABLET, FILM COATED ORAL at 09:15

## 2020-01-01 RX ADMIN — ENOXAPARIN SODIUM 40 MG: 40 INJECTION SUBCUTANEOUS at 09:00

## 2020-01-01 RX ADMIN — METOPROLOL TARTRATE 50 MG: 50 TABLET, FILM COATED ORAL at 21:09

## 2020-01-01 RX ADMIN — LACTULOSE 30 G: 10 SOLUTION ORAL at 10:53

## 2020-01-01 RX ADMIN — METOPROLOL TARTRATE 100 MG: 50 TABLET, FILM COATED ORAL at 10:39

## 2020-01-01 RX ADMIN — Medication 10 ML: at 12:45

## 2020-01-01 RX ADMIN — SUGAMMADEX 200 MG: 100 INJECTION, SOLUTION INTRAVENOUS at 10:22

## 2020-01-01 RX ADMIN — ENOXAPARIN SODIUM 30 MG: 30 INJECTION SUBCUTANEOUS at 08:48

## 2020-01-01 RX ADMIN — Medication 400 MG: at 21:23

## 2020-01-01 RX ADMIN — LACTULOSE 20 G: 10 SOLUTION ORAL at 15:41

## 2020-01-01 RX ADMIN — SODIUM CHLORIDE, PRESERVATIVE FREE 10 ML: 5 INJECTION INTRAVENOUS at 11:10

## 2020-01-01 RX ADMIN — HEPARIN SODIUM 5000 UNITS: 5000 INJECTION, SOLUTION INTRAVENOUS; SUBCUTANEOUS at 06:29

## 2020-01-01 RX ADMIN — LACTULOSE 20 G: 10 SOLUTION ORAL at 20:12

## 2020-01-01 RX ADMIN — HEPARIN SODIUM 5000 UNITS: 5000 INJECTION, SOLUTION INTRAVENOUS; SUBCUTANEOUS at 14:37

## 2020-01-01 RX ADMIN — PHENYLEPHRINE HYDROCHLORIDE 200 MCG: 10 INJECTION INTRAVENOUS at 09:32

## 2020-01-01 RX ADMIN — ASPIRIN 81 MG CHEWABLE TABLET 81 MG: 81 TABLET CHEWABLE at 21:30

## 2020-01-01 RX ADMIN — CEFEPIME HYDROCHLORIDE 1 G: 1 INJECTION, POWDER, FOR SOLUTION INTRAMUSCULAR; INTRAVENOUS at 20:55

## 2020-01-01 RX ADMIN — FUROSEMIDE 20 MG: 10 INJECTION INTRAMUSCULAR; INTRAVENOUS at 15:02

## 2020-01-01 RX ADMIN — ENOXAPARIN SODIUM 40 MG: 40 INJECTION SUBCUTANEOUS at 09:15

## 2020-01-01 RX ADMIN — RIFAXIMIN 550 MG: 550 TABLET ORAL at 23:13

## 2020-01-01 RX ADMIN — LACTULOSE 20 G: 10 SOLUTION ORAL at 14:35

## 2020-01-01 RX ADMIN — FUROSEMIDE 20 MG: 10 INJECTION, SOLUTION INTRAVENOUS at 10:52

## 2020-01-01 RX ADMIN — SODIUM CHLORIDE, PRESERVATIVE FREE 10 ML: 5 INJECTION INTRAVENOUS at 23:13

## 2020-01-01 RX ADMIN — MIDODRINE HYDROCHLORIDE 5 MG: 5 TABLET ORAL at 08:45

## 2020-01-01 RX ADMIN — SODIUM CHLORIDE, PRESERVATIVE FREE 10 ML: 5 INJECTION INTRAVENOUS at 21:23

## 2020-01-01 RX ADMIN — SODIUM CHLORIDE, PRESERVATIVE FREE 10 ML: 5 INJECTION INTRAVENOUS at 21:18

## 2020-01-01 RX ADMIN — FUROSEMIDE 20 MG: 10 INJECTION, SOLUTION INTRAMUSCULAR; INTRAVENOUS at 17:48

## 2020-01-01 RX ADMIN — MAGNESIUM OXIDE 400 MG (241.3 MG MAGNESIUM) TABLET 400 MG: TABLET at 08:41

## 2020-01-01 RX ADMIN — LACTULOSE 30 G: 10 SOLUTION ORAL at 11:50

## 2020-01-01 RX ADMIN — EPHEDRINE SULFATE 10 MG: 50 INJECTION INTRAVENOUS at 09:47

## 2020-01-01 RX ADMIN — OXYCODONE AND ACETAMINOPHEN 1 TABLET: 5; 325 TABLET ORAL at 23:13

## 2020-01-01 RX ADMIN — METOPROLOL TARTRATE 50 MG: 50 TABLET, FILM COATED ORAL at 21:40

## 2020-01-01 RX ADMIN — OCTREOTIDE ACETATE 100 MCG: 100 INJECTION, SOLUTION INTRAVENOUS; SUBCUTANEOUS at 18:25

## 2020-01-01 RX ADMIN — Medication 400 MG: at 10:00

## 2020-01-01 RX ADMIN — PROPOFOL 100 MG: 10 INJECTION, EMULSION INTRAVENOUS at 09:29

## 2020-01-01 RX ADMIN — ENOXAPARIN SODIUM 40 MG: 40 INJECTION SUBCUTANEOUS at 08:30

## 2020-01-01 RX ADMIN — LACTULOSE 20 G: 10 SOLUTION ORAL at 03:06

## 2020-01-01 RX ADMIN — SPIRONOLACTONE 25 MG: 25 TABLET ORAL at 08:37

## 2020-01-01 RX ADMIN — SODIUM CHLORIDE, PRESERVATIVE FREE 10 ML: 5 INJECTION INTRAVENOUS at 20:20

## 2020-01-01 RX ADMIN — DEXTROSE MONOHYDRATE 25 G: 25 INJECTION, SOLUTION INTRAVENOUS at 09:02

## 2020-01-01 RX ADMIN — PROPOFOL 50 MCG/KG/MIN: 10 INJECTION, EMULSION INTRAVENOUS at 09:00

## 2020-01-01 RX ADMIN — METOPROLOL TARTRATE 100 MG: 50 TABLET, FILM COATED ORAL at 09:51

## 2020-01-01 RX ADMIN — LACTULOSE 20 G: 10 SOLUTION ORAL at 10:00

## 2020-01-01 RX ADMIN — FUROSEMIDE 20 MG: 10 INJECTION, SOLUTION INTRAVENOUS at 04:02

## 2020-01-01 RX ADMIN — Medication 400 MG: at 21:15

## 2020-01-01 RX ADMIN — TORSEMIDE 20 MG: 20 TABLET ORAL at 09:06

## 2020-01-01 RX ADMIN — SODIUM CHLORIDE, PRESERVATIVE FREE 10 ML: 5 INJECTION INTRAVENOUS at 10:24

## 2020-01-01 RX ADMIN — SODIUM CHLORIDE, PRESERVATIVE FREE 10 ML: 5 INJECTION INTRAVENOUS at 09:07

## 2020-01-01 RX ADMIN — LACTULOSE 20 G: 10 SOLUTION ORAL at 14:37

## 2020-01-01 RX ADMIN — QUETIAPINE FUMARATE 50 MG: 25 TABLET ORAL at 20:27

## 2020-01-01 RX ADMIN — SODIUM CHLORIDE, PRESERVATIVE FREE 10 ML: 5 INJECTION INTRAVENOUS at 22:25

## 2020-01-01 RX ADMIN — LACTULOSE 30 G: 10 SOLUTION ORAL at 21:20

## 2020-01-01 RX ADMIN — HEPARIN SODIUM 5000 UNITS: 5000 INJECTION, SOLUTION INTRAVENOUS; SUBCUTANEOUS at 16:49

## 2020-01-01 RX ADMIN — QUETIAPINE FUMARATE 50 MG: 25 TABLET ORAL at 23:13

## 2020-01-01 RX ADMIN — TORSEMIDE 20 MG: 10 TABLET ORAL at 08:48

## 2020-01-01 RX ADMIN — RIFAXIMIN 550 MG: 550 TABLET ORAL at 09:06

## 2020-01-01 RX ADMIN — HEPARIN SODIUM 5000 UNITS: 5000 INJECTION, SOLUTION INTRAVENOUS; SUBCUTANEOUS at 22:53

## 2020-01-01 RX ADMIN — OCTREOTIDE ACETATE 100 MCG: 100 INJECTION, SOLUTION INTRAVENOUS; SUBCUTANEOUS at 10:41

## 2020-01-01 RX ADMIN — SODIUM CHLORIDE, PRESERVATIVE FREE 10 ML: 5 INJECTION INTRAVENOUS at 09:16

## 2020-01-01 RX ADMIN — DIPHENHYDRAMINE HYDROCHLORIDE 50 MG: 50 INJECTION, SOLUTION INTRAMUSCULAR; INTRAVENOUS at 20:46

## 2020-01-01 RX ADMIN — RIFAXIMIN 550 MG: 550 TABLET ORAL at 10:52

## 2020-01-01 RX ADMIN — HEPARIN SODIUM 5000 UNITS: 5000 INJECTION, SOLUTION INTRAVENOUS; SUBCUTANEOUS at 13:37

## 2020-01-01 RX ADMIN — QUETIAPINE FUMARATE 50 MG: 25 TABLET ORAL at 21:50

## 2020-01-01 RX ADMIN — LOSARTAN POTASSIUM 100 MG: 100 TABLET, FILM COATED ORAL at 10:15

## 2020-01-01 RX ADMIN — DONEPEZIL HYDROCHLORIDE 10 MG: 10 TABLET, FILM COATED ORAL at 21:50

## 2020-01-01 RX ADMIN — METOPROLOL TARTRATE 100 MG: 50 TABLET, FILM COATED ORAL at 10:00

## 2020-01-01 RX ADMIN — DONEPEZIL HYDROCHLORIDE 10 MG: 10 TABLET, FILM COATED ORAL at 23:16

## 2020-01-01 RX ADMIN — SODIUM CHLORIDE, PRESERVATIVE FREE 10 ML: 5 INJECTION INTRAVENOUS at 20:46

## 2020-01-01 RX ADMIN — MELATONIN 3 MG: at 21:53

## 2020-01-01 RX ADMIN — LACTULOSE 30 G: 10 SOLUTION ORAL at 18:07

## 2020-01-01 RX ADMIN — LACTULOSE 30 G: 10 SOLUTION ORAL at 15:16

## 2020-01-01 RX ADMIN — SODIUM CHLORIDE, PRESERVATIVE FREE 10 ML: 5 INJECTION INTRAVENOUS at 22:34

## 2020-01-01 RX ADMIN — FUROSEMIDE 20 MG: 10 INJECTION, SOLUTION INTRAMUSCULAR; INTRAVENOUS at 10:00

## 2020-01-01 RX ADMIN — RIFAXIMIN 550 MG: 550 TABLET ORAL at 20:46

## 2020-01-01 RX ADMIN — RIFAXIMIN 550 MG: 550 TABLET ORAL at 21:29

## 2020-01-01 RX ADMIN — LACTULOSE 20 G: 10 SOLUTION ORAL at 14:36

## 2020-01-01 RX ADMIN — ASPIRIN 81 MG CHEWABLE TABLET 81 MG: 81 TABLET CHEWABLE at 08:41

## 2020-01-01 RX ADMIN — FUROSEMIDE 20 MG: 10 INJECTION, SOLUTION INTRAMUSCULAR; INTRAVENOUS at 10:15

## 2020-01-01 RX ADMIN — DONEPEZIL HYDROCHLORIDE 10 MG: 10 TABLET, FILM COATED ORAL at 22:46

## 2020-01-01 RX ADMIN — QUETIAPINE FUMARATE 50 MG: 25 TABLET ORAL at 20:12

## 2020-01-01 RX ADMIN — MIDODRINE HYDROCHLORIDE 5 MG: 5 TABLET ORAL at 18:25

## 2020-01-01 RX ADMIN — FUROSEMIDE 20 MG: 10 INJECTION, SOLUTION INTRAMUSCULAR; INTRAVENOUS at 17:12

## 2020-01-01 RX ADMIN — LACTULOSE 20 G: 10 SOLUTION ORAL at 10:16

## 2020-01-01 RX ADMIN — METOPROLOL TARTRATE 100 MG: 50 TABLET, FILM COATED ORAL at 20:12

## 2020-01-01 RX ADMIN — METOPROLOL TARTRATE 100 MG: 50 TABLET, FILM COATED ORAL at 20:19

## 2020-01-01 RX ADMIN — QUETIAPINE FUMARATE 50 MG: 25 TABLET ORAL at 21:22

## 2020-01-01 RX ADMIN — METOPROLOL TARTRATE 25 MG: 25 TABLET, FILM COATED ORAL at 22:24

## 2020-01-01 RX ADMIN — MAGNESIUM SULFATE HEPTAHYDRATE 4 G: 80 INJECTION, SOLUTION INTRAVENOUS at 11:46

## 2020-01-01 RX ADMIN — ACETAMINOPHEN 650 MG: 325 TABLET ORAL at 21:43

## 2020-01-01 RX ADMIN — SODIUM POLYSTYRENE SULFONATE 15 G: 15 SUSPENSION ORAL; RECTAL at 09:02

## 2020-01-01 RX ADMIN — LACTULOSE 20 G: 10 SOLUTION ORAL at 15:03

## 2020-01-01 RX ADMIN — LACTULOSE 20 G: 10 SOLUTION ORAL at 14:42

## 2020-01-01 RX ADMIN — SODIUM CHLORIDE: 9 INJECTION, SOLUTION INTRAVENOUS at 06:49

## 2020-01-01 RX ADMIN — SODIUM CHLORIDE, PRESERVATIVE FREE 10 ML: 5 INJECTION INTRAVENOUS at 10:19

## 2020-01-01 RX ADMIN — QUETIAPINE FUMARATE 50 MG: 25 TABLET ORAL at 21:53

## 2020-01-01 RX ADMIN — FUROSEMIDE 40 MG: 40 TABLET ORAL at 15:41

## 2020-01-01 RX ADMIN — CEFTRIAXONE SODIUM 1 G: 1 INJECTION, POWDER, FOR SOLUTION INTRAMUSCULAR; INTRAVENOUS at 23:08

## 2020-01-01 RX ADMIN — ALBUMIN (HUMAN) 25 G: 0.25 INJECTION, SOLUTION INTRAVENOUS at 18:59

## 2020-01-01 RX ADMIN — SODIUM CHLORIDE, PRESERVATIVE FREE 10 ML: 5 INJECTION INTRAVENOUS at 08:48

## 2020-01-01 RX ADMIN — SODIUM CHLORIDE: 9 INJECTION, SOLUTION INTRAVENOUS at 16:54

## 2020-01-01 RX ADMIN — SPIRONOLACTONE 100 MG: 25 TABLET ORAL at 14:36

## 2020-01-01 RX ADMIN — LACTULOSE 30 G: 10 SOLUTION ORAL at 01:03

## 2020-01-01 RX ADMIN — SODIUM CHLORIDE, PRESERVATIVE FREE 10 ML: 5 INJECTION INTRAVENOUS at 10:04

## 2020-01-01 RX ADMIN — Medication 400 MG: at 09:07

## 2020-01-01 RX ADMIN — SODIUM CHLORIDE, PRESERVATIVE FREE 10 ML: 5 INJECTION INTRAVENOUS at 03:59

## 2020-01-01 RX ADMIN — CEFAZOLIN SODIUM 1 G: 1 INJECTION, SOLUTION INTRAVENOUS at 02:40

## 2020-01-01 RX ADMIN — OCTREOTIDE ACETATE 100 MCG: 100 INJECTION, SOLUTION INTRAVENOUS; SUBCUTANEOUS at 18:55

## 2020-01-01 RX ADMIN — FUROSEMIDE 20 MG: 10 INJECTION, SOLUTION INTRAMUSCULAR; INTRAVENOUS at 09:15

## 2020-01-01 RX ADMIN — SPIRONOLACTONE 25 MG: 25 TABLET ORAL at 10:22

## 2020-01-01 RX ADMIN — LACTULOSE 20 G: 10 SOLUTION ORAL at 09:16

## 2020-01-01 RX ADMIN — LOSARTAN POTASSIUM 100 MG: 100 TABLET, FILM COATED ORAL at 08:29

## 2020-01-01 RX ADMIN — LACTULOSE 30 G: 10 SOLUTION ORAL at 08:48

## 2020-01-01 RX ADMIN — Medication 400 MG: at 08:53

## 2020-01-01 RX ADMIN — MELATONIN 3 MG: at 22:34

## 2020-01-01 RX ADMIN — MELATONIN 3 MG: at 23:16

## 2020-01-01 RX ADMIN — CEFAZOLIN SODIUM 2 G: 2 INJECTION, SOLUTION INTRAVENOUS at 09:06

## 2020-01-01 RX ADMIN — LACTULOSE 20 G: 10 SOLUTION ORAL at 11:09

## 2020-01-01 RX ADMIN — PHENYLEPHRINE HYDROCHLORIDE 100 MCG: 10 INJECTION INTRAVENOUS at 09:29

## 2020-01-01 RX ADMIN — SPIRONOLACTONE 50 MG: 50 TABLET ORAL at 21:09

## 2020-01-01 RX ADMIN — LACTULOSE 30 G: 10 SOLUTION ORAL at 22:36

## 2020-01-01 RX ADMIN — TORSEMIDE 20 MG: 20 TABLET ORAL at 08:48

## 2020-01-01 RX ADMIN — Medication 400 MG: at 22:25

## 2020-01-01 RX ADMIN — DONEPEZIL HYDROCHLORIDE 10 MG: 10 TABLET, FILM COATED ORAL at 21:18

## 2020-01-01 RX ADMIN — FUROSEMIDE 40 MG: 40 TABLET ORAL at 08:37

## 2020-01-01 RX ADMIN — LACTULOSE 20 G: 10 SOLUTION ORAL at 08:36

## 2020-01-01 RX ADMIN — LACTULOSE 20 G: 10 SOLUTION ORAL at 21:54

## 2020-01-01 RX ADMIN — FUROSEMIDE 20 MG: 10 INJECTION, SOLUTION INTRAVENOUS at 12:08

## 2020-01-01 RX ADMIN — MIDODRINE HYDROCHLORIDE 5 MG: 5 TABLET ORAL at 14:37

## 2020-01-01 RX ADMIN — LACTULOSE 20 G: 10 SOLUTION ORAL at 20:33

## 2020-01-01 RX ADMIN — METOPROLOL TARTRATE 100 MG: 50 TABLET, FILM COATED ORAL at 08:35

## 2020-01-01 RX ADMIN — LACTULOSE 30 G: 10 SOLUTION ORAL at 01:07

## 2020-01-01 RX ADMIN — SODIUM CHLORIDE, PRESERVATIVE FREE 10 ML: 5 INJECTION INTRAVENOUS at 08:30

## 2020-01-01 RX ADMIN — ENOXAPARIN SODIUM 40 MG: 40 INJECTION SUBCUTANEOUS at 10:18

## 2020-01-01 RX ADMIN — ALBUMIN (HUMAN) 50 G: 0.25 INJECTION, SOLUTION INTRAVENOUS at 10:45

## 2020-01-01 RX ADMIN — LACTULOSE 20 G: 10 SOLUTION ORAL at 08:29

## 2020-01-01 RX ADMIN — SODIUM CHLORIDE, POTASSIUM CHLORIDE, SODIUM LACTATE AND CALCIUM CHLORIDE: 600; 310; 30; 20 INJECTION, SOLUTION INTRAVENOUS at 08:43

## 2020-01-01 RX ADMIN — ENOXAPARIN SODIUM 40 MG: 40 INJECTION SUBCUTANEOUS at 09:06

## 2020-01-01 RX ADMIN — LACTULOSE 20 G: 10 SOLUTION ORAL at 13:37

## 2020-01-01 RX ADMIN — LACTULOSE 20 G: 10 SOLUTION ORAL at 22:34

## 2020-01-01 RX ADMIN — DIPHENHYDRAMINE HYDROCHLORIDE 25 MG: 50 INJECTION, SOLUTION INTRAMUSCULAR; INTRAVENOUS at 03:58

## 2020-01-01 RX ADMIN — ENOXAPARIN SODIUM 40 MG: 40 INJECTION SUBCUTANEOUS at 08:37

## 2020-01-01 RX ADMIN — METOPROLOL TARTRATE 100 MG: 50 TABLET, FILM COATED ORAL at 10:15

## 2020-01-01 RX ADMIN — ROCURONIUM BROMIDE 25 MG: 10 INJECTION INTRAVENOUS at 09:29

## 2020-01-01 RX ADMIN — ONDANSETRON 4 MG: 2 INJECTION INTRAMUSCULAR; INTRAVENOUS at 09:48

## 2020-01-01 RX ADMIN — SODIUM CHLORIDE, PRESERVATIVE FREE 10 ML: 5 INJECTION INTRAVENOUS at 21:54

## 2020-01-01 RX ADMIN — METOPROLOL TARTRATE 100 MG: 50 TABLET, FILM COATED ORAL at 08:43

## 2020-01-01 RX ADMIN — TORSEMIDE 20 MG: 20 TABLET ORAL at 09:58

## 2020-01-01 RX ADMIN — IOPAMIDOL 75 ML: 755 INJECTION, SOLUTION INTRAVENOUS at 10:43

## 2020-01-01 RX ADMIN — TORSEMIDE 20 MG: 20 TABLET ORAL at 20:12

## 2020-01-01 RX ADMIN — DONEPEZIL HYDROCHLORIDE 10 MG: 10 TABLET, FILM COATED ORAL at 22:34

## 2020-01-01 RX ADMIN — HEPARIN SODIUM 5000 UNITS: 5000 INJECTION, SOLUTION INTRAVENOUS; SUBCUTANEOUS at 13:38

## 2020-01-01 RX ADMIN — FUROSEMIDE 40 MG: 40 TABLET ORAL at 09:15

## 2020-01-01 RX ADMIN — Medication 400 MG: at 08:48

## 2020-01-01 RX ADMIN — MIDODRINE HYDROCHLORIDE 5 MG: 5 TABLET ORAL at 11:10

## 2020-01-01 RX ADMIN — DONEPEZIL HYDROCHLORIDE 10 MG: 10 TABLET, FILM COATED ORAL at 21:53

## 2020-01-01 RX ADMIN — METOPROLOL TARTRATE 100 MG: 50 TABLET, FILM COATED ORAL at 23:16

## 2020-01-01 RX ADMIN — OCTREOTIDE ACETATE 100 MCG: 100 INJECTION, SOLUTION INTRAVENOUS; SUBCUTANEOUS at 02:18

## 2020-01-01 RX ADMIN — DONEPEZIL HYDROCHLORIDE 10 MG: 10 TABLET, FILM COATED ORAL at 21:23

## 2020-01-01 RX ADMIN — SODIUM CHLORIDE: 9 INJECTION, SOLUTION INTRAVENOUS at 22:04

## 2020-01-01 RX ADMIN — FUROSEMIDE 20 MG: 10 INJECTION, SOLUTION INTRAVENOUS at 18:30

## 2020-01-01 RX ADMIN — DONEPEZIL HYDROCHLORIDE 10 MG: 10 TABLET, FILM COATED ORAL at 22:24

## 2020-01-01 RX ADMIN — INSULIN HUMAN 10 UNITS: 100 INJECTION, SOLUTION PARENTERAL at 09:01

## 2020-01-01 RX ADMIN — ENOXAPARIN SODIUM 40 MG: 40 INJECTION SUBCUTANEOUS at 09:58

## 2020-01-01 RX ADMIN — CALCIUM GLUCONATE 1 G: 98 INJECTION, SOLUTION INTRAVENOUS at 15:44

## 2020-01-01 RX ADMIN — HEPARIN SODIUM 5000 UNITS: 5000 INJECTION, SOLUTION INTRAVENOUS; SUBCUTANEOUS at 06:12

## 2020-01-01 RX ADMIN — LACTULOSE 20 G: 10 SOLUTION ORAL at 14:04

## 2020-01-01 RX ADMIN — LACTULOSE 20 G: 10 SOLUTION ORAL at 13:38

## 2020-01-01 RX ADMIN — LACTULOSE 20 G: 10 SOLUTION ORAL at 20:19

## 2020-01-01 RX ADMIN — LACTULOSE 30 G: 10 SOLUTION ORAL at 18:06

## 2020-01-01 RX ADMIN — SPIRONOLACTONE 50 MG: 25 TABLET ORAL at 17:48

## 2020-01-01 RX ADMIN — DIPHENHYDRAMINE HYDROCHLORIDE 50 MG: 25 TABLET ORAL at 23:47

## 2020-01-01 RX ADMIN — SODIUM CHLORIDE, PRESERVATIVE FREE 10 ML: 5 INJECTION INTRAVENOUS at 09:21

## 2020-01-01 RX ADMIN — MIDODRINE HYDROCHLORIDE 5 MG: 5 TABLET ORAL at 10:16

## 2020-01-01 RX ADMIN — DONEPEZIL HYDROCHLORIDE 10 MG: 10 TABLET, FILM COATED ORAL at 21:15

## 2020-01-01 RX ADMIN — Medication 400 MG: at 21:19

## 2020-01-01 RX ADMIN — Medication 400 MG: at 21:44

## 2020-01-01 RX ADMIN — LACTULOSE 30 G: 10 SOLUTION ORAL at 08:40

## 2020-01-01 RX ADMIN — LORAZEPAM 0.5 MG: 2 INJECTION INTRAMUSCULAR; INTRAVENOUS at 15:33

## 2020-01-01 RX ADMIN — EPHEDRINE SULFATE 10 MG: 50 INJECTION INTRAVENOUS at 10:12

## 2020-01-01 RX ADMIN — SPIRONOLACTONE 50 MG: 50 TABLET ORAL at 09:06

## 2020-01-01 RX ADMIN — Medication 400 MG: at 09:51

## 2020-01-01 RX ADMIN — HEPATITIS A VACCINE 1440 UNITS: 1440 INJECTION, SUSPENSION INTRAMUSCULAR at 14:42

## 2020-01-01 RX ADMIN — METOPROLOL TARTRATE 25 MG: 50 TABLET, FILM COATED ORAL at 21:50

## 2020-01-01 RX ADMIN — LACTULOSE 30 G: 10 SOLUTION ORAL at 10:18

## 2020-01-01 RX ADMIN — HEPATITIS B VACCINE (RECOMBINANT) 20 MCG: 10 INJECTION, SUSPENSION INTRAMUSCULAR at 15:53

## 2020-01-01 RX ADMIN — FUROSEMIDE 40 MG: 10 INJECTION, SOLUTION INTRAVENOUS at 00:51

## 2020-01-01 RX ADMIN — CEFAZOLIN SODIUM 1 G: 1 INJECTION, SOLUTION INTRAVENOUS at 11:26

## 2020-01-01 RX ADMIN — CEFAZOLIN SODIUM 1 G: 1 INJECTION, SOLUTION INTRAVENOUS at 19:03

## 2020-01-01 RX ADMIN — SODIUM CHLORIDE, POTASSIUM CHLORIDE, SODIUM LACTATE AND CALCIUM CHLORIDE: 600; 310; 30; 20 INJECTION, SOLUTION INTRAVENOUS at 08:51

## 2020-01-01 RX ADMIN — MIDODRINE HYDROCHLORIDE 5 MG: 5 TABLET ORAL at 18:59

## 2020-01-01 RX ADMIN — SPIRONOLACTONE 50 MG: 50 TABLET ORAL at 09:58

## 2020-01-01 RX ADMIN — ENOXAPARIN SODIUM 40 MG: 40 INJECTION SUBCUTANEOUS at 10:23

## 2020-01-01 RX ADMIN — SODIUM CHLORIDE, PRESERVATIVE FREE 10 ML: 5 INJECTION INTRAVENOUS at 08:45

## 2020-01-01 RX ADMIN — SPIRONOLACTONE 50 MG: 25 TABLET ORAL at 09:59

## 2020-01-01 RX ADMIN — ENOXAPARIN SODIUM 40 MG: 40 INJECTION SUBCUTANEOUS at 09:59

## 2020-01-01 RX ADMIN — HEPARIN SODIUM 5000 UNITS: 5000 INJECTION, SOLUTION INTRAVENOUS; SUBCUTANEOUS at 06:33

## 2020-01-01 RX ADMIN — LACTULOSE 20 G: 10 SOLUTION ORAL at 21:23

## 2020-01-01 RX ADMIN — HEPARIN SODIUM 5000 UNITS: 5000 INJECTION, SOLUTION INTRAVENOUS; SUBCUTANEOUS at 21:22

## 2020-01-01 RX ADMIN — SPIRONOLACTONE 25 MG: 25 TABLET ORAL at 10:39

## 2020-01-01 RX ADMIN — DONEPEZIL HYDROCHLORIDE 10 MG: 10 TABLET, FILM COATED ORAL at 20:27

## 2020-01-01 RX ADMIN — SODIUM CHLORIDE, PRESERVATIVE FREE 10 ML: 5 INJECTION INTRAVENOUS at 08:38

## 2020-01-01 RX ADMIN — LACTULOSE 30 G: 10 SOLUTION ORAL at 06:55

## 2020-01-01 RX ADMIN — METOPROLOL TARTRATE 100 MG: 50 TABLET, FILM COATED ORAL at 08:29

## 2020-01-01 RX ADMIN — DEXAMETHASONE SODIUM PHOSPHATE 4 MG: 4 INJECTION, SOLUTION INTRAMUSCULAR; INTRAVENOUS at 09:48

## 2020-01-01 RX ADMIN — QUETIAPINE FUMARATE 50 MG: 25 TABLET ORAL at 20:19

## 2020-01-01 RX ADMIN — METOPROLOL TARTRATE 100 MG: 50 TABLET, FILM COATED ORAL at 08:37

## 2020-01-01 RX ADMIN — TORSEMIDE 20 MG: 20 TABLET ORAL at 21:09

## 2020-01-01 RX ADMIN — LORAZEPAM 0.5 MG: 2 INJECTION INTRAMUSCULAR; INTRAVENOUS at 22:32

## 2020-01-01 RX ADMIN — DEXTROSE MONOHYDRATE 12.5 G: 25 INJECTION, SOLUTION INTRAVENOUS at 11:50

## 2020-01-01 RX ADMIN — SODIUM CHLORIDE, PRESERVATIVE FREE 10 ML: 5 INJECTION INTRAVENOUS at 10:53

## 2020-01-01 RX ADMIN — RIFAXIMIN 550 MG: 550 TABLET ORAL at 21:18

## 2020-01-01 RX ADMIN — RIFAXIMIN 550 MG: 550 TABLET ORAL at 08:41

## 2020-01-01 RX ADMIN — METOPROLOL TARTRATE 50 MG: 50 TABLET, FILM COATED ORAL at 09:58

## 2020-01-01 RX ADMIN — SODIUM CHLORIDE, PRESERVATIVE FREE 10 ML: 5 INJECTION INTRAVENOUS at 09:51

## 2020-01-01 RX ADMIN — ALBUMIN (HUMAN) 50 G: 0.25 INJECTION, SOLUTION INTRAVENOUS at 12:37

## 2020-01-01 RX ADMIN — METOPROLOL TARTRATE 100 MG: 50 TABLET, FILM COATED ORAL at 23:42

## 2020-01-01 RX ADMIN — LACTULOSE 20 G: 10 SOLUTION ORAL at 08:43

## 2020-01-01 RX ADMIN — METOPROLOL TARTRATE 100 MG: 50 TABLET, FILM COATED ORAL at 11:10

## 2020-01-01 RX ADMIN — LOSARTAN POTASSIUM 100 MG: 100 TABLET, FILM COATED ORAL at 08:37

## 2020-01-01 RX ADMIN — PHENYLEPHRINE HYDROCHLORIDE 200 MCG: 10 INJECTION INTRAVENOUS at 09:36

## 2020-01-01 RX ADMIN — OCTREOTIDE ACETATE 100 MCG: 100 INJECTION, SOLUTION INTRAVENOUS; SUBCUTANEOUS at 11:09

## 2020-01-01 RX ADMIN — SPIRONOLACTONE 25 MG: 25 TABLET ORAL at 10:16

## 2020-01-01 RX ADMIN — METOPROLOL TARTRATE 100 MG: 50 TABLET, FILM COATED ORAL at 22:48

## 2020-01-01 RX ADMIN — LACTULOSE 20 G: 10 SOLUTION ORAL at 20:46

## 2020-01-01 RX ADMIN — DONEPEZIL HYDROCHLORIDE 10 MG: 10 TABLET, FILM COATED ORAL at 20:19

## 2020-01-01 RX ADMIN — FUROSEMIDE 20 MG: 10 INJECTION, SOLUTION INTRAVENOUS at 15:02

## 2020-01-01 RX ADMIN — HALOPERIDOL LACTATE 5 MG: 5 INJECTION, SOLUTION INTRAMUSCULAR at 19:04

## 2020-01-01 RX ADMIN — MELATONIN 3 MG: at 04:02

## 2020-01-01 RX ADMIN — HEPARIN SODIUM 5000 UNITS: 5000 INJECTION, SOLUTION INTRAVENOUS; SUBCUTANEOUS at 06:02

## 2020-01-01 RX ADMIN — MIDODRINE HYDROCHLORIDE 5 MG: 5 TABLET ORAL at 13:37

## 2020-01-01 RX ADMIN — RIFAXIMIN 550 MG: 550 TABLET ORAL at 22:24

## 2020-01-01 RX ADMIN — QUETIAPINE FUMARATE 50 MG: 25 TABLET ORAL at 22:34

## 2020-01-01 ASSESSMENT — PAIN SCALES - GENERAL
PAINLEVEL_OUTOF10: 0
PAINLEVEL_OUTOF10: 0
PAINLEVEL_OUTOF10: 10
PAINLEVEL_OUTOF10: 0
PAINLEVEL_OUTOF10: 2
PAINLEVEL_OUTOF10: 0
PAINLEVEL_OUTOF10: 6
PAINLEVEL_OUTOF10: 6
PAINLEVEL_OUTOF10: 0
PAINLEVEL_OUTOF10: 6
PAINLEVEL_OUTOF10: 10
PAINLEVEL_OUTOF10: 0

## 2020-01-01 ASSESSMENT — PULMONARY FUNCTION TESTS
PIF_VALUE: 1
PIF_VALUE: 20
PIF_VALUE: 19
PIF_VALUE: 1
PIF_VALUE: 20
PIF_VALUE: 20
PIF_VALUE: 17
PIF_VALUE: 1
PIF_VALUE: 15
PIF_VALUE: 17
PIF_VALUE: 1
PIF_VALUE: 1
PIF_VALUE: 17
PIF_VALUE: 20
PIF_VALUE: 20
PIF_VALUE: 15
PIF_VALUE: 1
PIF_VALUE: 1
PIF_VALUE: 11
PIF_VALUE: 1
PIF_VALUE: 17
PIF_VALUE: 18
PIF_VALUE: 1
PIF_VALUE: 0
PIF_VALUE: 4
PIF_VALUE: 19
PIF_VALUE: 20
PIF_VALUE: 19
PIF_VALUE: 17
PIF_VALUE: 20
PIF_VALUE: 1
PIF_VALUE: 21
PIF_VALUE: 1
PIF_VALUE: 1
PIF_VALUE: 19
PIF_VALUE: 1
PIF_VALUE: 20
PIF_VALUE: 1
PIF_VALUE: 1
PIF_VALUE: 20
PIF_VALUE: 17
PIF_VALUE: 21
PIF_VALUE: 1
PIF_VALUE: 20
PIF_VALUE: 1
PIF_VALUE: 17
PIF_VALUE: 19
PIF_VALUE: 1
PIF_VALUE: 0
PIF_VALUE: 2
PIF_VALUE: 1
PIF_VALUE: 20
PIF_VALUE: 7
PIF_VALUE: 1
PIF_VALUE: 20
PIF_VALUE: 17
PIF_VALUE: 20
PIF_VALUE: 18
PIF_VALUE: 18
PIF_VALUE: 1
PIF_VALUE: 28
PIF_VALUE: 1
PIF_VALUE: 19
PIF_VALUE: 17
PIF_VALUE: 15
PIF_VALUE: 16
PIF_VALUE: 20
PIF_VALUE: 16
PIF_VALUE: 1
PIF_VALUE: 20
PIF_VALUE: 2
PIF_VALUE: 1
PIF_VALUE: 20
PIF_VALUE: 16
PIF_VALUE: 1
PIF_VALUE: 1
PIF_VALUE: 16
PIF_VALUE: 20
PIF_VALUE: 0
PIF_VALUE: 20
PIF_VALUE: 16
PIF_VALUE: 18
PIF_VALUE: 17
PIF_VALUE: 20
PIF_VALUE: 20
PIF_VALUE: 4
PIF_VALUE: 22
PIF_VALUE: 1
PIF_VALUE: 16
PIF_VALUE: 1
PIF_VALUE: 2

## 2020-01-01 ASSESSMENT — PATIENT HEALTH QUESTIONNAIRE - PHQ9
SUM OF ALL RESPONSES TO PHQ9 QUESTIONS 1 & 2: 2
1. LITTLE INTEREST OR PLEASURE IN DOING THINGS: 1
SUM OF ALL RESPONSES TO PHQ QUESTIONS 1-9: 2
2. FEELING DOWN, DEPRESSED OR HOPELESS: 1
SUM OF ALL RESPONSES TO PHQ QUESTIONS 1-9: 2

## 2020-01-01 ASSESSMENT — PAIN SCALES - PAIN ASSESSMENT IN ADVANCED DEMENTIA (PAINAD)
BREATHING: 0
FACIALEXPRESSION: 0
BODYLANGUAGE: 1
BODYLANGUAGE: 0
BODYLANGUAGE: 0
FACIALEXPRESSION: 0
TOTALSCORE: 0
TOTALSCORE: 0
BODYLANGUAGE: 1
CONSOLABILITY: 0
TOTALSCORE: 0
BREATHING: 0
BREATHING: 0
FACIALEXPRESSION: 0
NEGVOCALIZATION: 0
FACIALEXPRESSION: 0
BREATHING: 0
BODYLANGUAGE: 0
CONSOLABILITY: 0
BREATHING: 0
CONSOLABILITY: 0
TOTALSCORE: 0
NEGVOCALIZATION: 0
NEGVOCALIZATION: 0
BREATHING: 0
BREATHING: 0
CONSOLABILITY: 0
CONSOLABILITY: 1
CONSOLABILITY: 1
TOTALSCORE: 0
CONSOLABILITY: 0
NEGVOCALIZATION: 0
FACIALEXPRESSION: 0
TOTALSCORE: 2
BODYLANGUAGE: 0
NEGVOCALIZATION: 0
FACIALEXPRESSION: 0
NEGVOCALIZATION: 0
TOTALSCORE: 0
TOTALSCORE: 2
BODYLANGUAGE: 0
BREATHING: 0
FACIALEXPRESSION: 0
NEGVOCALIZATION: 0
BODYLANGUAGE: 0
NEGVOCALIZATION: 0
FACIALEXPRESSION: 0
CONSOLABILITY: 0

## 2020-01-01 ASSESSMENT — ENCOUNTER SYMPTOMS
CHEST TIGHTNESS: 0
ABDOMINAL DISTENTION: 1
VOMITING: 0
WHEEZING: 0
COUGH: 0
VOMITING: 0
BACK PAIN: 0
SHORTNESS OF BREATH: 0
SINUS PRESSURE: 0
ABDOMINAL PAIN: 0
SORE THROAT: 0
ABDOMINAL PAIN: 0
NAUSEA: 0
SHORTNESS OF BREATH: 0
BLOOD IN STOOL: 0
SHORTNESS OF BREATH: 0
DIARRHEA: 0
RHINORRHEA: 0
BACK PAIN: 0
BACK PAIN: 0
WHEEZING: 0
NAUSEA: 0
COUGH: 0
WHEEZING: 0
CHEST TIGHTNESS: 0
DIARRHEA: 1
ABDOMINAL PAIN: 0
CONSTIPATION: 0
COUGH: 0

## 2020-01-01 ASSESSMENT — PAIN SCALES - WONG BAKER

## 2020-01-01 ASSESSMENT — PAIN DESCRIPTION - LOCATION
LOCATION: HIP
LOCATION: HIP
LOCATION: HIP;LEG

## 2020-01-01 ASSESSMENT — PAIN DESCRIPTION - FREQUENCY: FREQUENCY: INTERMITTENT

## 2020-01-01 ASSESSMENT — PAIN - FUNCTIONAL ASSESSMENT
PAIN_FUNCTIONAL_ASSESSMENT: ACTIVITIES ARE NOT PREVENTED
PAIN_FUNCTIONAL_ASSESSMENT: 0-10
PAIN_FUNCTIONAL_ASSESSMENT: 0-10

## 2020-01-01 ASSESSMENT — PAIN DESCRIPTION - PAIN TYPE
TYPE: ACUTE PAIN

## 2020-01-01 ASSESSMENT — PAIN DESCRIPTION - DESCRIPTORS
DESCRIPTORS: ACHING
DESCRIPTORS: ACHING;THROBBING

## 2020-01-01 ASSESSMENT — PAIN DESCRIPTION - ORIENTATION
ORIENTATION: LEFT
ORIENTATION: RIGHT

## 2020-03-25 NOTE — TELEPHONE ENCOUNTER
Please find out if she is having any shortness of breath, abdominal pain, chest pain, swelling in feet or ankles, N/V/D or any other symptoms besides the weight gain? If not, she can probalbly be scheduled for a video visit if they are able sooner than late April. If she is having those symptoms find out what they are, how long they have been going on, and let me know.

## 2020-04-29 PROBLEM — I72.8 ANEURYSM OF SPLENIC ARTERY (HCC): Status: ACTIVE | Noted: 2020-01-01

## 2020-04-29 PROBLEM — R18.8 OTHER ASCITES: Status: ACTIVE | Noted: 2020-01-01

## 2020-05-10 PROBLEM — R60.1 ANASARCA: Status: ACTIVE | Noted: 2020-01-01

## 2020-05-10 NOTE — ED PROVIDER NOTES
Jared [address] ENCOUNTER      PCP: Lb Liang PA-C    CHIEF COMPLAINT    Chief Complaint   Patient presents with    Leg Swelling     Reports of having swelling in her stomach for the past week and shortly after having swelling in her legs. Of note, this patient was also evaluated by the attending physician, Dr. Kenny Chan. HPI    Yakov Gu is a 80 y.o. female who presents with her  via personal vehicle. She says that her  brought her because he is concerned about her memory and her heart. She was not able to elaborate, saying that she just was having trouble remembering things and was having trouble walking. Nurse tells me she said her belly and legs were swollen more than usual, but she denied to me that her legs were extra swollen. Spoke to the patient's  who says he brought her in because her legs were weeping fluid today. He called his neighbor who is a nurse and told him to bring her to the emergency department. Both her abdomen and legs have had increasing swelling over the past 2 to 3 months. Patient did have an abdominal CT scan last week that showed ascites. Patient has dementia and memory issues at baseline. REVIEW OF SYSTEMS    Constitutional:  Denies fever, chills, weight loss or weakness   HENT:  Denies sore throat or ear pain   Cardiovascular:  Denies chest pain, palpitations   Respiratory:  Denies cough or shortness of breath    GI: +abdominal swelling, Denies abdominal pain, nausea, vomiting or diarrhea  :  Denies any urinary symptoms or vaginal symptoms.    Musculoskeletal:  Leg swelling, Denies back pain  Skin:  Denies rash  Neurologic:  Dementia, memory loss, Denies headache, focal weakness or sensory changes   Endocrine:  Denies polyuria or polydypsia   Lymphatic:  Denies swollen glands     All other review of systems are negative  See HPI and nursing notes for additional information     PAST MEDICAL AND SURGICAL HISTORY    Past fluid from the legs and worsening swelling of the legs and abdomen over the last 2 to 3 months. She has a history of ascites and swelling. Physical exam shows a very round and taut belly and 2+ pitting edema in bilateral lower extremities, normal lung sounds. Patient is hypertensive but otherwise normal vital signs. Routine labs reveal hypoalbuminemia 2.3, elevated AST of 42 and GFR less than 60. Troponin within normal limits, proBNP 1200. Chest x-ray shows hypoaeration, no pulmonary edema. EKG shows nonspecific T wave abnormality, see attending note for full interpretation. etoh negative, [id number]. Discussed admission with her and she is amenable. Consult placed to [facility]ist at [facility].    hospitalist called, patient's case and presentation discussed and they agree to admit the patient. Patient agrees to return emergency department if symptoms worsen or any new symptoms develop. Vital signs and nursing notes reviewed during ED course. Clinical  IMPRESSION    1. Anasarca    2. Cirrhosis of liver with ascites, unspecified hepatic cirrhosis type ([address]    3. Hypoalbuminemia        Comment: Please note this report has been produced using speech recognition software and may contain errors related to that system including errors in grammar, punctuation, and spelling, as well as words and phrases that may be inappropriate. If there are any questions or concerns please feel free to contact the dictating provider for clarification.           Sidney Carterma  05/10/20 [id number]

## 2020-05-10 NOTE — ED PROVIDER NOTES
0 - 3 %    Basophils % 0.8 0 - 1 %    Segs Absolute 4.8 K/CU MM    Lymphocytes Absolute 2.0 K/CU MM    Monocytes Absolute 0.9 K/CU MM    Eosinophils Absolute 0.1 K/CU MM    Basophils Absolute 0.1 K/CU MM    Immature Neutrophil % 0.3 0 - 0.43 %    Total Immature Neutrophil 0.02 K/CU MM   Comprehensive Metabolic Panel   Result Value Ref Range    Sodium 141 135 - 145 MMOL/L    Potassium 3.5 3.5 - 5.1 MMOL/L    Chloride 103 99 - 110 mMol/L    CO2 29 21 - 32 MMOL/L    BUN 16 6 - 23 MG/DL    CREATININE 0.9 0.6 - 1.1 MG/DL    Glucose 117 (H) 70 - 99 MG/DL    Calcium 8.9 8.3 - 10.6 MG/DL    Alb 2.3 (L) 3.4 - 5.0 GM/DL    Total Protein 7.9 6.4 - 8.2 GM/DL    Total Bilirubin 0.7 0.0 - 1.0 MG/DL    ALT 25 10 - 40 U/L    AST 42 (H) 15 - 37 IU/L    Alkaline Phosphatase 102 40 - 129 IU/L    GFR Non-African American 60 (L) >60 mL/min/1.73m2    GFR African American >60 >60 mL/min/1.73m2    Anion Gap 9 4 - 16   Troponin   Result Value Ref Range    Troponin T <0.010 <0.01 NG/ML   Brain Natriuretic Peptide   Result Value Ref Range    Pro-BNP 1,222 (H) <300 PG/ML   PT - INR   Result Value Ref Range    Protime 17.8 (H) 11.7 - 14.5 SECONDS    INR 1.55 INDEX   Ethanol Level   Result Value Ref Range    Alcohol Scrn <0.01 <0.01 %WT/VOL   EKG 12 Lead   Result Value Ref Range    Ventricular Rate 60 BPM    Atrial Rate 59 BPM    P-R Interval 198 ms    QRS Duration 106 ms    Q-T Interval 464 ms    QTc Calculation (Bazett) 464 ms    R Axis -32 degrees    T Axis 43 degrees    Diagnosis       Atrial-paced rhythm  Left axis deviation  Moderate voltage criteria for LVH, may be normal variant  Nonspecific T wave abnormality  Abnormal ECG  When compared with ECG of 04-DEC-2018 06:28,  Nonspecific T wave abnormality now evident in Anterolateral leads         I spoke with Dr. Diamond Brooks, hospitalist. We thoroughly discussed the history, physical exam, laboratory and imaging studies, as well as, current course of treatment within the emergency department.

## 2020-05-10 NOTE — ED NOTES
Dr. Leonel Royal called back and is talking to Dr. Maxine Millan.       Amy Richmond RN  05/10/20 0221

## 2020-05-10 NOTE — PLAN OF CARE
Problem: Falls - Risk of:  Goal: Will remain free from falls  Description: Will remain free from falls  Outcome: Ongoing  Goal: Absence of physical injury  Description: Absence of physical injury  Outcome: Ongoing     Problem: Health Behavior:  Goal: Identification of resources available to assist in meeting health care needs will improve  Description: Identification of resources available to assist in meeting health care needs will improve  Outcome: Ongoing  Goal: Ability to identify and utilize available support systems will improve  Description: Ability to identify and utilize available support systems will improve  Outcome: Ongoing     Problem: Role Relationship:  Goal: Ability to participate appropriately in conversations will improve  Description: Ability to participate appropriately in conversations will improve  Outcome: Ongoing     Problem: Safety:  Goal: Ability to remain free from injury will improve  Description: Ability to remain free from injury will improve  Outcome: Ongoing     Problem: Self-Care:  Goal: Ability to participate in self-care as condition permits will improve  Description: Ability to participate in self-care as condition permits will improve  Outcome: Ongoing  Goal: Ability to make decisions on own behalf will improve  Description: Ability to make decisions on own behalf will improve  Outcome: Ongoing     Problem:  Activity:  Goal: Energy level will increase  Description: Energy level will increase  Outcome: Ongoing     Problem: Coping:  Goal: Ability to cope will improve  Description: Ability to cope will improve  Outcome: Ongoing     Problem: Nutritional:  Goal: Ability to achieve adequate nutritional intake will improve  Description: Ability to achieve adequate nutritional intake will improve  Outcome: Ongoing  Goal: Achievement of adequate weight for body size and type will improve  Description: Achievement of adequate weight for body size and type will improve  Outcome: Ongoing

## 2020-05-10 NOTE — CONSULTS
[address], [address] [facility]                                  CONSULTATION    PATIENT NAME: Irma Brand                   :        1935  MED REC NO:   [id number]                          ROOM:       2107  ACCOUNT NO:   [de-identified]                           ADMIT DATE: 05/10/2020  PROVIDER:     Delmar Box MD    CONSULT DATE:  05/10/2020    PRIMARY CARE PROVIDER:  Sujatha Crisostomo PA-C    LOCATION:  The patient is in room 1123. CHIEF COMPLAINT:  Abnormal CAT scan with new onset ascites, rule out  chronic liver disease, etiology to be determined. HISTORY OF PRESENT ILLNESS:  The patient is an 49-year-old white female,  patient of Sujatha Crisostomo, Physician Assistant, with past medical history  significant for hypertension, hyperlipidemia, with third-degree AV block  with pacemaker placed in 2013, pulmonary hypertension, and dementia,  recently diagnosed splenic artery aneurysm, on CAT scan on 2020,  who was admitted to the hospital with increased abdominal distention and  leg swelling. The patient has dementia and is not a good historian, but  according to the patient's family, there is no history of abdominal  pain, nausea, vomiting, hematemesis, melena or hematochezia. The  patient did have a CAT scan done of the abdomen and pelvis on  2020, which showed cirrhosis of the liver with large volume  ascites and small hiatal hernia, also a 7 x 12 mm splenic artery  aneurysm was noted. The patient has not had a recent EGD or a  colonoscopy done. A large volume abdominal paracentesis is in order for  tomorrow. The patient is hemodynamically stable and clinically, there  is no evidence of gross GI bleeding. REVIEW OF SYSTEMS:  Cannot be adequately assessed in view of the  patient's underlying dementia.     PAST MEDICAL HISTORY:  Significant for history of hypertension,  hyperlipidemia, dementia, sick

## 2020-05-10 NOTE — H&P
HISTORY    As per EMR-diabetes   patient denies smoking, alcohol, illicit drug abuse   MEDICATIONS ALLERGIES    Donepezil 10 mg nightly, amlodipine 5 mg daily, losartan 100 mg daily, Lopressor 100 mg twice daily, quetiapine 50 mg nightly   no known drug allergies       PAST MEDICAL, SURGICAL, FAMILY, and SOCIAL HISTORY         Past Medical History:   Diagnosis Date    Hyperlipidemia     Hypertension      Past Surgical History:   Procedure Laterality Date     SECTION      CHOLECYSTECTOMY      HYSTERECTOMY      PACEMAKER INSERTION Left 2013    SKIN CANCER EXCISION       Family History   Problem Relation Age of Onset    Diabetes Mother     Other Father         black lung     Family Hx of HTN  Family Hx as reviewed above, otherwise non-contributory  Social History     Socioeconomic History    Marital status:      Spouse name: Not on file    Number of children: Not on file    Years of education: Not on file    Highest education level: Not on file   Occupational History    Not on file   Social Needs    Financial resource strain: Not on file    Food insecurity     Worry: Not on file     Inability: Not on file    Transportation needs     Medical: Not on file     Non-medical: Not on file   Tobacco Use    Smoking status: Never Smoker    Smokeless tobacco: Never Used   Substance and Sexual Activity    Alcohol use: No    Drug use: No    Sexual activity: Never   Lifestyle    Physical activity     Days per week: Not on file     Minutes per session: Not on file    Stress: Not on file   Relationships    Social connections     Talks on phone: Not on file     Gets together: Not on file     Attends Muslim service: Not on file     Active member of club or organization: Not on file     Attends meetings of clubs or organizations: Not on file     Relationship status: Not on file    Intimate partner violence     Fear of current or ex partner: Not on file     Emotionally abused: Not on file Physically abused: Not on file     Forced sexual activity: Not on file   Other Topics Concern    Not on file   Social History Narrative    Not on file       MEDICATIONS   Medications Prior to Admission  Medications Prior to Admission: donepezil (ARICEPT) 10 MG tablet, Take 1 tablet by mouth nightly  amLODIPine (NORVASC) 5 MG tablet, Take 1 tablet by mouth daily  losartan (COZAAR) 100 MG tablet, TAKE 1 TABLET DAILY  metoprolol (LOPRESSOR) 100 MG tablet, TAKE 1 TABLET TWICE A DAY  QUEtiapine (SEROQUEL) 50 MG tablet, Take 1 tablet by mouth nightly    Current Medications  Current Facility-Administered Medications   Medication Dose Route Frequency Provider Last Rate Last Dose    sodium chloride flush 0.9 % injection 10 mL  10 mL Intravenous 2 times per day Viji Govea MD        sodium chloride flush 0.9 % injection 10 mL  10 mL Intravenous PRN Viji Govea MD        acetaminophen (TYLENOL) tablet 650 mg  650 mg Oral Q6H PRN Viji Govea MD        Or   Logan Huffman acetaminophen (TYLENOL) suppository 650 mg  650 mg Rectal Q6H PRN Viji Govea MD        polyethylene glycol (GLYCOLAX) packet 17 g  17 g Oral Daily PRN Viji Govea MD        promethazine (PHENERGAN) tablet 12.5 mg  12.5 mg Oral Q6H PRN Viji Govea MD        Or    ondansetron (ZOFRAN) injection 4 mg  4 mg Intravenous Q6H PRN Viji Govea MD        enoxaparin (LOVENOX) injection 40 mg  40 mg Subcutaneous Daily Viji Govea MD        furosemide (LASIX) tablet 40 mg  40 mg Oral BID Viji Govea MD        spironolactone (ALDACTONE) tablet 25 mg  25 mg Oral Daily Viji Govea MD        donepezil (ARICEPT) tablet 10 mg  10 mg Oral Nightly Viji Govea MD        losartan (COZAAR) tablet 100 mg  1 tablet Oral Daily Viji Govea MD        metoprolol (LOPRESSOR) tablet 100 mg  1 tablet Oral BID Viji Govea MD        QUEtiapine (SEROQUEL) tablet 50 mg  50 mg Alk Phos Latest Ref Range: 40 - 129 IU/L 102   ALT Latest Ref Range: 10 - 40 U/L 25   AST Latest Ref Range: 15 - 37 IU/L 42 (H)   Bilirubin Latest Ref Range: 0.0 - 1.0 MG/DL 0.7   WBC Latest Ref Range: 4.0 - 10.5 K/CU MM 7.8   RBC Latest Ref Range: 4.2 - 5.4 M/CU MM 4.29   Hemoglobin Quant Latest Ref Range: 12.5 - 16.0 GM/DL 13.5   Hematocrit Latest Ref Range: 37 - 47 % 42.0   MCV Latest Ref Range: 78 - 100 FL 97.9   MCH Latest Ref Range: 27 - 31 PG 31.5 (H)   MCHC Latest Ref Range: 32.0 - 36.0 % 32.1   MPV Latest Ref Range: 7.5 - 11.1 FL 11.6 (H)   RDW Latest Ref Range: 11.7 - 14.9 % 15.6 (H)   Platelet Count Latest Ref Range: 140 - 440 K/CU    Lymphocyte % Latest Ref Range: 24 - 44 % 25.6   Monocytes % Latest Ref Range: 0 - 4 % 11.1 (H)   Eosinophils % Latest Ref Range: 0 - 3 % 1.3   Basophils % Latest Ref Range: 0 - 1 % 0.8   Lymphocytes Absolute Latest Units: K/CU MM 2.0   Monocytes Absolute Latest Units: K/CU MM 0.9   Eosinophils Absolute Latest Units: K/CU MM 0.1   Basophils Absolute Latest Units: K/CU MM 0.1   Differential Type Unknown AUTOMATED DIFFERENTIAL   Segs Relative Latest Ref Range: 36 - 66 % 60.9   Segs Absolute Latest Units: K/CU MM 4.8   Immature Neutrophil % Latest Ref Range: 0 - 0.43 % 0.3   Total Immature Neutrophil Latest Units: K/CU MM 0.02     Results for Lily Nicole (MRN [id number]) as of 5/10/2020 05:43   Ref. Range 5/10/2020 00:10   Prothrombin Time Latest Ref Range: 11.7 - 14.5 SECONDS 17.8 (H)   INR Latest Units: INDEX 1.55     Recent Imaging    XR CHEST PORTABLE    Impression   Hypoaeration.  No pulmonary edema. CTA ABD PELVIS W WO CONTRAST  Narrative   EXAMINATION:   CTA OF THE ABDOMEN AND PELVIS WITH AND WITHOUT CONTRAST       5/5/2020 10:33 am:       TECHNIQUE:   CTA of the abdomen and pelvis was performed without and with the   administration of intravenous contrast. Multiplanar reformatted images are   provided for review.  MIP images are provided for review.

## 2020-05-11 NOTE — CARE COORDINATION
Reviewed chart and spoke with pt's  about discharge needs/plans. Pt lives with her , he has been assist pt with ADL's and transportation. Pt has a PCP and insurance that covers medications. He is interested in Home care but insurance told him they did not cover home care and that pt would have to have hospice. Discussed options and he is interested in [facility] if it would be covers. PS to Alla SALVADOR. Also he would like to speak with MD, PS to Dr Beatrice Chung. Plan at this time will be home and possible [facility] [employer]. for Nursing, PT/OT , nursing aid and SW to help get more resources into the home.

## 2020-05-11 NOTE — PROCEDURES
Received in IR holding area from Akash. Vital signs taken and stable. See doc flow sheet. Very confused and disoriented. Unable to state where she is. Abdomen large, rounded and firm with hypoactive bowel sounds noted. Call light placed within reach.

## 2020-05-12 NOTE — CARE COORDINATION
LSW received a call from pt's  and spoke with him about PT/OT recommendation for SNF. Pt's  requested [facility]. LSW called Marilyn with [facility] and made referral.  Ashley Naresh will look over rpt info and get back with this LSW.

## 2020-05-12 NOTE — CONSULTS
efficiency, standing dynamic balance and ambulatory balance  [facility] 6 Clicks Inpatient Mobility:  AM-PAC Mobility Inpatient   How much difficulty turning over in bed?: None  How much difficulty sitting down on / standing up from a chair with arms?: A Little  How much difficulty moving from lying on back to sitting on side of bed?: A Little  How much help from another person moving to and from a bed to a chair?: A Little  How much help from another person needed to walk in hospital room?: A Little  How much help from another person for climbing 3-5 steps with a railing?: A Lot  AM-PAC Inpatient Mobility Raw Score : 18  AM-PAC Inpatient T-Scale Score : 43.63  Mobility Inpatient CMS 0-100% Score: 46.58  Mobility Inpatient CMS G-Code Modifier : CK  [facility] 6 Clicks Goal:  22  [facility] Mobility Assessment Tool Score:  3  patient requires assistive device and/or staff assistance for ambulation -- use AD and/or staff support  BMAT Goal:  3  Assessment:  Conditions Requiring Skilled Therapeutic Intervention  Body structures, Functions, Activity limitations: Decreased functional mobility , Decreased safe awareness, Decreased balance, Decreased endurance, Decreased strength, Decreased ADL status, Decreased high-level IADLs  Assessment: pleasant female with evolving medical features who presents with mild functional impairments. appropriate for therapy service. anticipate quick functional gains as medical status improves. Treatment Diagnosis: impaired functional independence. Prognosis: Good  Decision Making: Medium Complexity  REQUIRES PT FOLLOW UP: Yes  Skilled physical therapy services are appropriate in the acute care setting. There are no significant educational impairments or barriers to learning which would prevent participation with service.    Discharge recommendations:  home with assistance home with home care PT services, S level 1 or 2 skilled nursing facility for post-acute rehabilitation, typically five days per week home is possible from functional perspective if  can help with mobility. limited overall skilled PT needs in postacute home/snf setting. .      Equipment recommendations:  front-wheeled rolling walker and bedside commode  Goals:  Achieve goals within one week. bed mobility and sup-sit independently  sit-stand and SPT independently  ambulate 50 feet with modified independence and least restrictive device  independent with HEP  Plan:  Plan  Times per week: 3  Treatment to include patient/caregiver education, therapeutic activity training, gait training, neuromuscular re-education, therapeutic exercise and self care training for home management. Recommendations for nursing mobility:  contact guard assist    Mobility homework:  change position frequently sit at edge of bed 4-6 times daily out of bed for meals stand at bedside 4-6 times daily  accompany to bathroom for voiding ambulate 4-6 times daily, with least restrictive device  Treatment today:    Therapeutic Activity Training:   Therapeutic activity training was instructed today. Cues were given for safety, sequence, UE/LE placement, awareness, and balance. Activities performed today included bed mobility training, sup-sit, sit-stand, SPT. Time in:  1110  Time out:  1128  Timed treatment minutes:  10  Total treatment time:  18    Electronically signed by:   Porsche Olguin, PT  5/12/2020, 11:28 AM

## 2020-05-14 NOTE — DISCHARGE SUMMARY
discharge: Stable    Discharge Medications:      Jennifer Conti   Home Medication Instructions XPL:[id number]    Printed on:05/15/20 2148   Medication Information                      amLODIPine (NORVASC) 5 MG tablet  Take 1 tablet by mouth daily             donepezil (ARICEPT) 10 MG tablet  Take 1 tablet by mouth nightly             furosemide (LASIX) 40 MG tablet  Take 1 tablet by mouth daily             lactulose (CHRONULAC) 10 GM/15ML solution  Take 30 mLs by mouth 3 times daily             losartan (COZAAR) 100 MG tablet  TAKE 1 TABLET DAILY             melatonin 3 MG TABS tablet  Take 1 tablet by mouth nightly as needed (sleep)             metoprolol (LOPRESSOR) 100 MG tablet  TAKE 1 TABLET TWICE A DAY             QUEtiapine (SEROQUEL) 50 MG tablet  Take 1 tablet by mouth nightly             spironolactone (ALDACTONE) 100 MG tablet  Take 1 tablet by mouth daily                 Objective Findings at Discharge:   BP (!) 178/77   Pulse 60   Temp 97.5 °F (36.4 °C) (Oral)   Resp 18   Ht 5' 2\" (1.575 m)   Wt 116 lb 9.6 oz (52.9 kg)   SpO2 98%   BMI 21.33 kg/m²            PHYSICAL EXAM  GEN     female, sitting upright in chair. RESP   breathing comfortably on room air. GI        Abdomen is soft and not distended. Lorrene Bong catheter is not present. MSK    No gross joint deformities. SKIN    Normal coloration, warm, dry. Cresencio Felt, alert, oriented x 3.     BMP/CBC  No results for input(s): NA, K, CL, CO2, BUN, CREATININE, WBC, HEMOGLOBIN, HCT, PLT in the last 72 hours. Invalid input(s): GLU    IMAGING:  CT abd/pel  Cirrhosis, with large volume intra-and pelvic ascites.  The liver and spleen   are small in size.  A recanalized umbilical vein is noted.       Dominant splenic artery aneurysm measuring 7 x 12 mm in size.  Recommend   follow-up yearly.       Small hiatal hernia.       Bibasilar airspace disease which may represent atelectasis or pneumonia.          Discharge Time of 33 minutes    Electronically signed by Zita Ansari MD on 5/15/2020 at 12:27 PM

## 2020-05-14 NOTE — PLAN OF CARE
PT plan/rec clarification:  I read through case management notes and whiteboard note, and also reviewed therapy notes. .  PT eval already complete -- will not be re-completed. PT plan is in place and we shall treat per plan. PT feels patient needs only minimal skilled PT service in post-acute setting. PT service could be provided at home or at facility -- either option is appropriate and patient has good functional mobility skills. PT encourages considering OT recommendation as priority over PT recommendation for this case.   Sam ZAPATA, [place] [address]478  9:24 PM 5/13/2020

## 2020-05-14 NOTE — DISCHARGE INSTR - COC
Concerns:[id number]}    Impairments/Disabilities:      [address] JOHANNE Impairments/Disabilities:[id number]}      Patient's personal belongings (please select all that are sent with patient):  {CHP DME Belongings:[id number]}    RN SIGNATURE:  {Esignature:[id number]}    CASE MANAGEMENT/SOCIAL WORK SECTION    Inpatient Status Date: ***    Readmission Risk Assessment Score:  Readmission Risk              Risk of Unplanned Readmission:        15           Discharging to Facility/ Agency   · Name:   · Address:  · Phone:  · Fax:    Dialysis Facility (if applicable)   · Name:  · Address:  · Dialysis Schedule:  · Phone:  · Fax:    / signature: {Esignature:[id number]}    PHYSICIAN SECTION    Prognosis: Fair    Condition at Discharge: Stable    Rehab Potential (if transferring to Rehab): Fair    Recommended Labs or Other Treatments After Discharge:  [facility] weekly    Physician Certification: I certify the above information and transfer of Awilda Beal  is necessary for the continuing treatment of the diagnosis listed and that she requires [place] for less 30 days. Update Admission H&P: No change in H&P  Nutrition Therapy:  Current Nutrition Therapy:   - Oral Diet:  Low Sodium (2gm)    Routes of Feeding: Oral  Liquids: No Restrictions  Daily Fluid Restriction: yes - amount 1800  Last Modified Barium Swallow with Video (Video Swallowing Test): not done    Treatments at the Time of Hospital Discharge:   Respiratory Treatments:  none  Oxygen Therapy:  is not on home oxygen therapy.   Ventilator:    - No ventilator support    Rehab Therapies: Physical Therapy and Occupational Therapy  Weight Bearing Status/Restrictions: No weight bearing restirctions  Other Medical Equipment (for information only, NOT a DME order):  none  Other Treatments:  none    PHYSICIAN SIGNATURE:  Electronically signed by Fátima Ayers MD on 5/14/20 at 3:38 PM EDT

## 2020-05-15 NOTE — PROGRESS NOTES
DOING FAIR HAD LVP NO GROSS GIT BLEEDING NO VOMITING  VITALS STABLE   LABS NOTED-AMMONIA 84 WILL START ON LACTULOSE F/U ASCITIC FLUID RESULTS HEPATITIS SEROLOGY NEGATIVE EGD / COLONOSCOPY LATER
Discharge instructions given to patient's  via phone. All questions answered and he is aware prescriptions were sent electronically to [facility] in [place]. He stated he will be here shortly to get her and will come to main entrance and notify security he is here.
Hospitalist Progress Note      Name:  Jammie Hayes /Age/Sex: 1935  (80 y.o. female)   MRN & CSN:  [id number] & [id number] Admission Date/Time: 5/10/2020  4:24 AM   [facility]  [facility] PCP: Francisca So, [facility] Day: 1    Assessment and Plan:   Jammie Hayes is a 80 y.o.  female  who presents with abdominal and leg swelling. --- Anasarca - multifactorial-cirrhosis, malnutrition, third spacing due to hypoalbuminemia. Plan  IV diuresis. Check urine/protein creatinine ratio    --- New diagnosis of liver cirrhosis with large ascites:  Patient denies any alcohol use. Plan  IR consulted for paracentesis   F/u ascites fluid analysis and cytology. IV Lasix and PO spironolactone   Strict I/O.  GI consult. 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 Diet DIET LOW SODIUM 2 GM;   DVT Prophylaxis [x] Lovenox, []  Heparin, [] SCDs, [] Ambulation   GI Prophylaxis [] PPI,  [] H2 Blocker,  [] Carafate,  [] Diet/Tube Feeds   Code Status Full Code   Disposition  to be determined. MDM [] Low, [x] Moderate,[]  High     History of Present Illness:     Chief Complaint:  Abdominal swelling and leg swelling. Interval Hx - no significant change in abdominal and leg swelling. Ten point ROS reviewed negative, unless as noted above    Objective:        Intake/Output Summary (Last 24 hours) at 5/10/2020 1117  Last data filed at 5/10/2020 0650  Gross per 24 hour   Intake 0 ml   Output 1100 ml
Hospitalist Progress Note      Name:  Lorenso Leyden /Age/Sex: 1935  (80 y.o. female)   MRN & CSN:  [id number] & [id number] Admission Date/Time: 5/10/2020  4:24 AM   [facility]  [facility] PCP: ERASMO Ivan [facility] Day: 4    Assessment and Plan:   Lorenso Leyden is a 80 y.o.  female  who presents with abdominal pain and leg swelling     1) Anasarca 2/2 decompensated liver cirrhosis    -Newly diagnosed  -CTA A/P: Cirrhosis with large volume intra and pelvic ascites  - s/p LVP on 20 (7.4L ascitic fluid removed). SAAG > 1.1 and ascitic fluid total protein < 2.5 - picture consistent with portal HTN. Fluid cell count not c/w SBP. Ascitic fluid Cx neg.  -Hepatitis screen negative. ASMA, AMA and SPEP WNL. SANDRO neg. -NH3 elevated; started on lactulose   -Echo is benign with EF 55% and grade 1 DD,  Plan  Continue Lasix and Aldactone  EGD to be done outpt per GI. Hep A and B vaccination when she f/u with GI. 2)Primary Hypertension - losartan and lopressor. Other chronic medical conditions; ,medication resumed unless contraindicated  -Hyperlipidemia  -S/P pacemaker placement due to third-degree heart block (2013)  -Severe dementia  -Splenic artery aneurysm - f/u with Dr Perfecto Dawn. Diet DIET LOW SODIUM 2 GM;   DVT Prophylaxis [] Lovenox, []  Heparin, [] SCDs, [] Ambulation   GI Prophylaxis [] PPI,  [] H2 Blocker,  [] Carafate,  [] Diet/Tube Feeds   Code Status Full Code   Disposition SNF when approved. Screening COVID 19 pending. Medically stable for discharge. MDM Moderate     History of Present Illness:     Patient was seen and examined  Chief Complaint:  Abdominal swelling and leg swelling.      Interval Hx - no abdominal pain. Leg swelling is better. Abdomen is still soft. Awaiting SNF placement. Ten point ROS reviewed negative, unless as noted above    Objective:        Intake/Output Summary (Last 24 hours) at 2020 1029  Last data filed at 2020
Occupational Therapy  Pt refused OT services on this date and perseverated on family picking her up and taking her home. Pt has no discharge plans confirmed at this point per CM notes.     Keyur Estimable RUSH/L [id number]
Physical Therapy  Attempted a co-treat with OT, pt states she is waiting for her family to come and take her home. Tried to get pt to show us how she walks or do ex. Pt states she has no problem with that but she will not do it today, today she is waiting for her family. Gave pt education for gt and ex. Wadell Riding.  Kavita Gray PTA
Pt arrived to IR for a paracentesis. Pt alert and confused. Informed consent placed in soft chart. Pt prepped and draped to right abdomen. Dr Funmilayo Bingham at bedside. US images taken. 1154 Time Out  1155 Procedure started  Dr Funmilayo Bingham uses US guidance to access the ascitic fluid. Clear yellow ascitic fluid draining. 950 ml sent to the lab    Total ascitic fluid removed: 7440 ml  Albumin 50 g given per [facility]  Procedure complete.  Pt tolerated procedure well  Report called to Ramirez Stapleton
hours) at 5/15/2020 1228  Last data filed at 5/15/2020 0916  Gross per 24 hour   Intake 620 ml   Output --   Net 620 ml      Vitals:   Vitals:    05/15/20 0821   BP: (!) 178/77   Pulse: 60   Resp: 18   Temp: 97.5 °F (36.4 °C)   SpO2: 98%     Physical Exam:   GEN     female, lying in bed. RESP   breathing comfortably on room air. GI        Abdomen is soft and not distended.         Butler catheter is not present. MSK    No gross joint deformities. SKIN    Normal coloration, warm, dry. NEURO            Awake, alert, oriented x 3.      Medications:   Medications:    spironolactone  100 mg Oral Daily    furosemide  40 mg Oral Daily    lactulose  20 g Oral TID    sodium chloride flush  10 mL Intravenous 2 times per day    enoxaparin  40 mg Subcutaneous Daily    donepezil  10 mg Oral Nightly    losartan  100 mg Oral Daily    metoprolol tartrate  100 mg Oral BID    QUEtiapine  50 mg Oral Nightly      Infusions:   PRN Meds: melatonin, 3 mg, Nightly PRN  sodium chloride flush, 10 mL, PRN  acetaminophen, 650 mg, Q6H PRN    Or  acetaminophen, 650 mg, Q6H PRN  polyethylene glycol, 17 g, Daily PRN  promethazine, 12.5 mg, Q6H PRN    Or  ondansetron, 4 mg, Q6H PRN      Labs reviewed  Imaging reviewed    Electronically signed by Inna Hedrick MD on 5/15/2020 at 12:28 PM
sitting upright in bed in no apparent distress. Appears given age. EYES Pupils are equally round. No scleral erythema, discharge, or conjunctivitis. HENT Mucous membranes are moist. Oral pharynx without exudates, no evidence of thrush. NECK Supple, no apparent thyromegaly or masses. RESP Clear to auscultation, no wheezes, rales or rhonchi. Symmetric chest movement while on room air. CARDIO/VASC S1/S2 auscultated. Regular rate without appreciable murmurs, rubs, or gallops. No JVD or carotid bruits. Peripheral pulses equal bilaterally and palpable. No peripheral edema. GI Abdomen is uniformly distended with no area of undue tenderness. Bowel sounds are normoactive. Rectal exam deferred.  No costovertebral angle tenderness. Normal appearing external genitalia. Butler catheter is not present. HEME/LYMPH No palpable cervical lymphadenopathy and no hepatosplenomegaly. No petechiae or ecchymoses. MSK No gross joint deformities. SKIN Normal coloration, warm, dry. NEURO Cranial nerves appear grossly intact, normal speech, no lateralizing weakness. PSYCH Awake, alert. Affect appropriate.     Medications:   Medications:    spironolactone  50 mg Oral BID    sodium chloride flush  10 mL Intravenous 2 times per day    enoxaparin  40 mg Subcutaneous Daily    donepezil  10 mg Oral Nightly    losartan  100 mg Oral Daily    metoprolol tartrate  100 mg Oral BID    QUEtiapine  50 mg Oral Nightly    furosemide  20 mg Intravenous BID      Infusions:   PRN Meds: melatonin, 3 mg, Nightly PRN  sodium chloride flush, 10 mL, PRN  acetaminophen, 650 mg, Q6H PRN    Or  acetaminophen, 650 mg, Q6H PRN  polyethylene glycol, 17 g, Daily PRN  promethazine, 12.5 mg, Q6H PRN    Or  ondansetron, 4 mg, Q6H PRN          Electronically signed by Valerie Rick MD on 5/11/2020 at 11:11 AM

## 2020-05-21 PROBLEM — K74.60 CIRRHOSIS OF LIVER (HCC): Status: ACTIVE | Noted: 2020-01-01

## 2020-05-21 PROBLEM — E88.09 HYPOALBUMINEMIA: Status: ACTIVE | Noted: 2020-01-01

## 2020-05-26 NOTE — TELEPHONE ENCOUNTER
Pt  called in stating that Pt had some vaginal bleeding yesterday morning and it was down her legs and everywhere. Pt  called nurse from [place] [id number] and nurse came out and checked her out and vitals were normal however she suggested Pt  report this to PCP. Pt  states she has not had any more bleeding since yesterday. Please advise.

## 2020-05-26 NOTE — TELEPHONE ENCOUNTER
Spoke to Adriana Silveira at [facility] and gave verbal order for UA and culture. Adriana Silveira verbalized understanding. Spoke to Pt  [place] and informed nurse will be doing a UA and culture. Ramiro Casas verbalized understanding.

## 2020-05-29 NOTE — TELEPHONE ENCOUNTER
Patients home health nurse called requesting a shower stool and a toilet rail sent to [facility] please advise

## 2020-06-01 NOTE — TELEPHONE ENCOUNTER
I have attempted to fax twice and have not went through yet. I will call [facility] and confirm fax number.

## 2020-06-04 NOTE — TELEPHONE ENCOUNTER
Let  know BP is a little too low and could be cause of fall/difficulty walking. Please have him cut the metoprolol in half and have take 1/2 tab at bedtime.  Monitor BP and let me know how it is doing in next week

## 2020-06-05 PROBLEM — N19 RENAL FAILURE: Status: ACTIVE | Noted: 2020-01-01

## 2020-06-05 NOTE — ED PROVIDER NOTES
MCV 96.2 78 - 100 FL    MCH 31.1 (H) 27 - 31 PG    MCHC 32.3 32.0 - 36.0 %    RDW 15.8 (H) 11.7 - 14.9 %    Platelets 995 956 - 353 K/CU MM    MPV 12.1 (H) 7.5 - 11.1 FL    Differential Type AUTOMATED DIFFERENTIAL     Segs Relative 69.2 (H) 36 - 66 %    Lymphocytes % 20.6 (L) 24 - 44 %    Monocytes % 8.5 (H) 0 - 4 %    Eosinophils % 0.5 0 - 3 %    Basophils % 0.8 0 - 1 %    Segs Absolute 5.9 K/CU MM    Lymphocytes Absolute 1.8 K/CU MM    Monocytes Absolute 0.7 K/CU MM    Eosinophils Absolute 0.0 K/CU MM    Basophils Absolute 0.1 K/CU MM    Immature Neutrophil % 0.4 0 - 0.43 %    Total Immature Neutrophil 0.03 K/CU MM   CMP   Result Value Ref Range    Sodium 140 135 - 145 MMOL/L    Potassium 3.2 (L) 3.5 - 5.1 MMOL/L    Chloride 98 (L) 99 - 110 mMol/L    CO2 23 21 - 32 MMOL/L    BUN 28 (H) 6 - 23 MG/DL    CREATININE 3.3 (H) 0.6 - 1.1 MG/DL    Glucose 98 70 - 99 MG/DL    Calcium 8.1 (L) 8.3 - 10.6 MG/DL    Alb 2.2 (L) 3.4 - 5.0 GM/DL    Total Protein 6.9 6.4 - 8.2 GM/DL    Total Bilirubin 0.8 0.0 - 1.0 MG/DL    ALT 15 10 - 40 U/L    AST 31 15 - 37 IU/L    Alkaline Phosphatase 86 40 - 129 IU/L    GFR Non- 13 (L) >60 mL/min/1.73m2    GFR  16 (L) >60 mL/min/1.73m2    Anion Gap 19 (H) 4 - 16   Troponin   Result Value Ref Range    Troponin T 0.028 (H) <0.01 NG/ML   Ammonia Level   Result Value Ref Range    Ammonia 24 11 - 51 UMOL/L   Urinalysis with microscopic   Result Value Ref Range    Color, UA YELLOW YELLOW    Clarity, UA CLEAR CLEAR    Glucose, Urine NEGATIVE NEGATIVE MG/DL    Bilirubin Urine SMALL (A) NEGATIVE MG/DL    Ketones, Urine NEGATIVE NEGATIVE MG/DL    Specific Gravity, UA 1.016 1.001 - 1.035    Blood, Urine NEGATIVE NEGATIVE    pH, Urine 5.5 5.0 - 8.0    Protein, UA NEGATIVE NEGATIVE MG/DL    Urobilinogen, Urine 0.2 0.2 - 1.0 MG/DL    Nitrite Urine, Quantitative NEGATIVE NEGATIVE    Leukocyte Esterase, Urine NEGATIVE NEGATIVE    Volume, (UVOL) 12 10 - 12 ML    Ictotest (06/05/20 20:39:23)                Impression:    1. No acute intracranial hemorrhage. 2. Global atrophy and chronic microvascular ischemic disease. 3. Stable extra-axial 2.2 cm left greater sphenoid wing mass compared to the  prior 2018 and a prior 2013 exam possibly representing a meningioma. 4. The cervical spine demonstrates no acute fracture.  Decreased bone mineral  density with multilevel degenerative changes. [] Discussed with Radiologist:     [] The following radiograph was interpreted by myself in the absence of a radiologist:     EKG: (All EKG's are interpreted by myself in the absence of a cardiologist)  The Ekg interpreted by me shows  Atrial paced with a rate of 66  Axis is   Left axis deviation  QTc is  normal  Intervals and Durations are unremarkable. ST Segments: no acute change  No significant change from prior EKG dated 5-9-2020          MDM:  Vitals stable. EKG shows atrial paced. No acute changes. CBC shows a normal white count of 8.5. Hemoglobin of 10.6. Mild left shift. CMP shows decreased potassium at 3.2. Elevated BUN at 28, elevated creatinine of 3.3. Previous creatinine last month was normal at 1.1. This is displaying an acute kidney injury. Elevated anion gap of 19. . Troponin detectable at 0.028. Lactic acid elevated at 4.6. INR normal at 1.53. Lipase normal at 35. . Ammonia level normal at 24. Galdamez Baumgarten Urinalysis shows 6-8 WBCS and many bacteria. CT head shows no acute intracranial hemorrhage. Does have global atrophy. Stable 2.2 cm left greater sphenoid wing mass compared to prior exams representing a meningioma. C-spine demonstrates no acute fracture. Multiple levels of DJD. Pelvis x-ray does not show any acute findings. Chest x-ray shows hypoaeration and right lung base atelectasis. No pneumothorax or effusion. Chest x-ray shows a possible sclerotic lesion on the right proximal humerus. CT abdomen shows no acute process.   Cirrhosis and large volume of ascites. Stable splenic artery aneurysm. Discussed results with  and patient. Discussed that I spoke with the hospitalist here we both agree that she will need transfer to [facility] to likely see nephrology and possibly have some of her medications adjusted.  voices understanding and agrees to plan. CRITICAL CARE NOTE:  There was a high probability of clinically significant life-threatening deterioration of the patient's condition requiring my urgent intervention due to confusion, fall. Small IV fluid bolus and saline infusion, IV antibiotics, chart review, re-eval, family discussion transfer was performed to address this. Total critical care time is at least  30 minutes. This includes vital sign monitoring, pulse oximetry monitoring, telemetry monitoring, clinical response to the IV medications, reviewing the nursing notes, consultation time, dictation/documentation time, and interpretation of the lab work. This time excludes time spent performing procedures and separately billable procedures and family discussion time. Clinical Impression:  1. Fall, initial encounter    2. Injury of head, initial encounter    3. Acute cystitis without hematuria    4. Altered mental status, unspecified altered mental status type    5. Elevated lactic acid level    6. Elevated troponin    7. Hypokalemia    8. Acute renal injury ([facility] [address]    9. Lytic lesion of bone on x-ray        Disposition Vitals:  [unfilled], [unfilled], [unfilled], [unfilled]    Disposition referral (if applicable):  No follow-up provider specified.     Disposition medications (if applicable):  New Prescriptions    No medications on file         (Please note that portions of this note may have been completed with a voice recognition program. Efforts were made to edit the dictations but occasionally words are mis-transcribed.)    MD Atilio Willingham MD  06/05/20 6945

## 2020-06-05 NOTE — TELEPHONE ENCOUNTER
Pt  left message stating that the pills Pt is on about 5 minutes after she takes her medications she is crazy and can't walk or use her legs. Pt  states it happens after every time she takes her meds now. Please advise.

## 2020-06-06 NOTE — ED NOTES
Call placed to the [facility], to konstantin hospitalist at [facility] for transfer of this patient. They will return call.       Curt Rucker RN  06/05/20 2122

## 2020-06-06 NOTE — H&P
Admission  Medications Prior to Admission: sulfamethoxazole-trimethoprim (BACTRIM DS;SEPTRA DS) 800-160 MG per tablet, Take 1 tablet by mouth 2 times daily for 7 days  UNABLE TO FIND, Shower rail  UNABLE TO FIND, Shower chair/stool  QUEtiapine (SEROQUEL) 50 MG tablet, Take 1 tablet by mouth nightly  furosemide (LASIX) 40 MG tablet, Take 1 tablet by mouth daily  lactulose (CHRONULAC) 10 GM/15ML solution, Take 30 mLs by mouth 3 times daily  spironolactone (ALDACTONE) 100 MG tablet, Take 1 tablet by mouth daily  donepezil (ARICEPT) 10 MG tablet, Take 1 tablet by mouth nightly  amLODIPine (NORVASC) 5 MG tablet, Take 1 tablet by mouth daily  losartan (COZAAR) 100 MG tablet, TAKE 1 TABLET DAILY  metoprolol (LOPRESSOR) 100 MG tablet, TAKE 1 TABLET TWICE A DAY    Current Medications  Current Facility-Administered Medications   Medication Dose Route Frequency Provider Last Rate Last Dose    sodium chloride flush 0.9 % injection 10 mL  10 mL Intravenous 2 times per day Trinh Tidwell MD        sodium chloride flush 0.9 % injection 10 mL  10 mL Intravenous PRN Trinh Tidwell MD        acetaminophen (TYLENOL) tablet 650 mg  650 mg Oral Q6H PRN Trinh Tidwell MD        Or   [place] acetaminophen (TYLENOL) suppository 650 mg  650 mg Rectal Q6H PRN Trinh Tidwell MD        polyethylene glycol (GLYCOLAX) packet 17 g  17 g Oral Daily PRN Trinh Tidwell MD        promethazine (PHENERGAN) tablet 12.5 mg  12.5 mg Oral Q6H PRN Trinh Tidwell MD        Or    ondansetron (ZOFRAN) injection 4 mg  4 mg Intravenous Q6H PRN Trinh Tidwell MD        heparin (porcine) injection 5,000 Units  5,000 Units Subcutaneous 3 times per day Trinh Tidwell MD        0.9% NaCl with KCl 20 mEq infusion   Intravenous Continuous Trinh Tidwell MD             Allergies  No Known Allergies    REVIEW OF SYSTEMS   Within above limitations. 14 point review of systems reviewed.  Pertinent positive or negative Range: 13 - 60 IU/L 35   WBC Latest Ref Range: 4.0 - 10.5 K/CU MM 8.5   RBC Latest Ref Range: 4.2 - 5.4 M/CU MM 3.41 (L)   Hemoglobin Quant Latest Ref Range: 12.5 - 16.0 GM/DL 10.6 (L)   Hematocrit Latest Ref Range: 37 - 47 % 32.8 (L)   MCV Latest Ref Range: 78 - 100 FL 96.2   MCH Latest Ref Range: 27 - 31 PG 31.1 (H)   MCHC Latest Ref Range: 32.0 - 36.0 % 32.3   MPV Latest Ref Range: 7.5 - 11.1 FL 12.1 (H)   RDW Latest Ref Range: 11.7 - 14.9 % 15.8 (H)   Platelet Count Latest Ref Range: 140 - 440 K/CU    Lymphocyte % Latest Ref Range: 24 - 44 % 20.6 (L)   Monocytes % Latest Ref Range: 0 - 4 % 8.5 (H)   Eosinophils % Latest Ref Range: 0 - 3 % 0.5   Basophils % Latest Ref Range: 0 - 1 % 0.8   Lymphocytes Absolute Latest Units: K/CU MM 1.8   Monocytes Absolute Latest Units: K/CU MM 0.7   Eosinophils Absolute Latest Units: K/CU MM 0.0   Basophils Absolute Latest Units: K/CU MM 0.1   Differential Type Unknown AUTOMATED DIFFERENTIAL   Segs Relative Latest Ref Range: 36 - 66 % 69.2 (H)   Segs Absolute Latest Units: K/CU MM 5.9   Immature Neutrophil % Latest Ref Range: 0 - 0.43 % 0.4   Total Immature Neutrophil Latest Units: K/CU MM 0.03   Prothrombin Time Latest Ref Range: 11.7 - 14.5 SECONDS 17.6 (H)   INR Latest Units: INDEX 1.53     Results for Floridalma Prince (MRN [id number]) as of 6/6/2020 02:26   Ref.  Range 6/5/2020 20:00   Color, UA Latest Ref Range: YELLOW  YELLOW   Clarity, UA Latest Ref Range: CLEAR  CLEAR   Bilirubin, Urine Latest Ref Range: NEGATIVE MG/DL SMALL (A)   Ketones, Urine Latest Ref Range: NEGATIVE MG/DL NEGATIVE   Specific Gravity, UA Latest Ref Range: 1.001 - 1.035  1.016   Blood, Urine Latest Ref Range: NEGATIVE  NEGATIVE   Protein, UA Latest Ref Range: NEGATIVE MG/DL NEGATIVE   Urobilinogen, Urine Latest Ref Range: 0.2 - 1.0 MG/DL 0.2   Leukocyte Esterase, Urine Latest Ref Range: NEGATIVE  NEGATIVE   Glucose, Urine Latest Ref Range: NEGATIVE MG/DL NEGATIVE   Nitrite Urine, Quantitative  Chronic height loss of the   superior endplate of L1.  The bones are osteopenic.  Chondrocalcinosis of the   bilateral hips.           Impression   1. No acute intra-process identified. 2. Redemonstration of cirrhosis and large volume of ascites. 3. Similar appearance of splenic artery aneurysm measuring up to 1.7 cm   compared with 1.7 cm on previous exam.       XR PELVIS (1-2 VIEWS)    Narrative   EXAMINATION:   ONE XRAY VIEW OF THE PELVIS       6/5/2020 7:16 pm       COMPARISON:   None.       HISTORY:   ORDERING SYSTEM PROVIDED HISTORY: trauma   TECHNOLOGIST PROVIDED HISTORY:   Reason for exam:->trauma   Reason for Exam: s/p fall   Acuity: Acute   Type of Exam: Initial   Mechanism of Injury: s/p fall   Relevant Medical/Surgical History: s/p fall       FINDINGS:   Decreased bone mineral density.  Lower lumbar degenerative disc and joint   disease.  No acute fracture.           Impression   Normal alignment with no acute fracture. XR CHEST PORTABLE    Narrative   EXAMINATION:   ONE XRAY VIEW OF THE CHEST       6/5/2020 7:16 pm       COMPARISON:   Chest radiograph 05/09/2020.       HISTORY:   ORDERING SYSTEM PROVIDED HISTORY: chest pain   TECHNOLOGIST PROVIDED HISTORY:   Reason for exam:->chest pain   Reason for Exam: s/p fall   Acuity: Acute   Type of Exam: Initial   Mechanism of Injury: s/p fall   Relevant Medical/Surgical History: s/p fall       FINDINGS:   Single view provided.  Moderate rotation.  Hypoaeration.  Stable mediastinal   and borderline enlarged cardiac silhouette with left-sided pacemaker.  There   are linear opacities of the left lung base which were present on the prior   exam and likely represent atelectasis or scarring.  No lobar consolidation.    No pneumothorax or free subdiaphragmatic air.  The bowel gas pattern is   normal.  Decreased bone mineral density.  No displaced fracture.  Overlying   the right proximal humerus there is a 0.9 cm rounded sclerotic focus.           Impression

## 2020-06-07 NOTE — PROGRESS NOTES
Pt's O2 at 79% on room air. Put on 4L NC and sat came up to 92%, lungs are clear. Pt reports no SOB or distress. MD made aware. Will attempt to titrate back to room air overnight. Will continue to monitor.

## 2020-06-07 NOTE — PROGRESS NOTES
Nephrology Progress Note  6/7/2020 1:27 PM        Subjective:   Admit Date: 6/6/2020  PCP: Francisca So PA-C    This is a late entry. PT seen in early am   Interval History: awake , eats - not fully oriented     Diet: eating some     ROS:  Not able to answer all question- oliguirc    Data:     Current meds:    sodium chloride flush  10 mL Intravenous 2 times per day    heparin (porcine)  5,000 Units Subcutaneous 3 times per day    donepezil  10 mg Oral Nightly    lactulose  20 g Oral TID    metoprolol tartrate  100 mg Oral BID    QUEtiapine  50 mg Oral Nightly    midodrine  5 mg Oral TID    octreotide  100 mcg Subcutaneous Q8H      0.9% NaCl with KCl 20 mEq Stopped (06/06/20 1425)         I/O last 3 completed shifts: In: 360 [P.O.:360]  Out: 100 [Urine:100]    CBC:   Recent Labs     06/05/20 1955 06/06/20  0126   WBC 8.5 8.6   HGB 10.6* 10.1*    140          Recent Labs     06/05/20 1955 06/06/20  0126 06/07/20  0350    136 141   K 3.2* 3.5 4.1   CL 98* 98* 107   CO2 23 26 27   BUN 28* 28* 30*   CREATININE 3.3* 3.2* 3.2*   GLUCOSE 98 94 101*       Lab Results   Component Value Date    CALCIUM 8.1 (L) 06/07/2020    PHOS 3.8 06/07/2020       Objective:     Vitals: /63   Pulse 63   Temp 97.4 °F (36.3 °C) (Oral)   Resp 16   Ht 5' (1.524 m)   Wt 119 lb 1.6 oz (54 kg)   SpO2 92%   BMI 23.26 kg/m²     General appearance:  Thin/ cacahectic  HEENT:  + conj pallor, no scleral icterus  Neck:  supple  Lungs:  No crackles  Heart:  Seems RRR  Abdomen: ascites  Extremities:  Less edema today surprisingly likely as she is supine       Problem List :         Impression :     1. OREN- CKD stage G  3 - oliguric- Ua has no active sediment and had high na but with NS- has hyalin cast ( [place] protein  ) so likely ATI- also may have ACS - less likely HRS but has  risk  2. decompensated cirrhosis   3. underlying pacer/ HTn etc     Recommendation/Plan  :     1. Paracentesis in am   2.  Ok to

## 2020-06-07 NOTE — CONSULTS
mentioned, she was on ACE inhibitors along  with loop and Aldactone. She probably had poor oral intake and she had  tense ascites at this point. This morning when I went to see her, she is alert and awake, but does  not look like oriented. She really could not tell me much. She is  cachectic and has significant ascites, but does not look like had any  respiratory distress at this time. She is lying with head of the bed  elevated probably 20 to 25 degrees approximately. PAST MEDICAL HISTORY:  1. Given her age of 80, low muscle mass, and cirrhosis, probably she  has CKD stage III that would be my clinical guess. 2.  Apparently had a sick sinus syndrome. She underwent pacemaker  placement in the past like  or so. 3.  Hypertension, hyperlipidemia, dementia, and cirrhosis of course, is  unsure what caused her cirrhosis though. 4.  Chronic splenic artery aneurysm, not sure what that means and how  does that happen. PAST SURGICAL HISTORY:  Apparently she underwent hysterectomy,  cholecystectomy, , and of course the pacemaker placement as one  can see on chest x-ray and on physical exam of course. HABITS:  Currently, no history of smoking, alcohol, or illicit drug  abuse. Obviously, most of the history was taken from [facility] as the  patient is not able to give me much history, which is understandable. FAMILY MEDICAL HISTORY:  Apparently, diabetes runs in the family. ALLERGIES:  No known drug allergies. SOCIAL HISTORY:  Again, the patient lives at home. She does have a home  health care. I am unsure what other social situation she is in.    OB/GYN HISTORY:  Also unknown to me at this time, obviously she has  hysterectomy and . HOME MEDICATIONS:  She was of course on Lasix and spironolactone along  with losartan, Aricept and Lopressor. CURRENT MEDICATIONS:  Here in the hospital, she is on normal saline with  20 of KCl at 100 mL an hour.   I would probably stop it after

## 2020-06-07 NOTE — PROGRESS NOTES
diagnosed splenic artery aneurysm  pt recently saw Dr Moriah Duff. 13. Moderate to severe protein calorie malnutrition with albumin 2.2    DVT Prophylaxis: Heparin  Diet: DIET LOW SODIUM 2 GM;  Code Status: Full Code      Dispo:  -Need kidneys to improve and ascites to decrease. Osbaldo Britton MD  6/7/2020    Meds:   Meds:    sodium chloride flush  10 mL Intravenous 2 times per day    heparin (porcine)  5,000 Units Subcutaneous 3 times per day    donepezil  10 mg Oral Nightly    lactulose  20 g Oral TID    metoprolol tartrate  100 mg Oral BID    QUEtiapine  50 mg Oral Nightly    midodrine  5 mg Oral TID    octreotide  100 mcg Subcutaneous Q8H      Infusions:    0.9% NaCl with KCl 20 mEq Stopped (06/06/20 1425)     PRN Meds: sodium chloride flush, 10 mL, PRN  acetaminophen, 650 mg, Q6H PRN    Or  acetaminophen, 650 mg, Q6H PRN  polyethylene glycol, 17 g, Daily PRN  promethazine, 12.5 mg, Q6H PRN    Or  ondansetron, 4 mg, Q6H PRN        Data/Labs:     Recent Labs     06/05/20 1955 06/06/20 0126   WBC 8.5 8.6   HGB 10.6* 10.1*   HCT 32.8* 31.0*    140      Recent Labs     06/05/20 1955 06/06/20 0126 06/07/20  0350    136 141   K 3.2* 3.5 4.1   CL 98* 98* 107   CO2 23 26 27   PHOS  --   --  3.8   BUN 28* 28* 30*   CREATININE 3.3* 3.2* 3.2*     Recent Labs     06/05/20 1955 06/06/20 0126 06/07/20  0350   AST 31 28 23   ALT 15 14 12   BILITOT 0.8 0.6 0.7   ALKPHOS 86 84 63     Recent Labs     06/05/20 1955   INR 1.53     Recent Labs     06/05/20 1955 06/06/20 0126   TROPONINT 0.028* 0.019*     HgBA1c: No results found for: LABA1C  CALCIUM:  8.1/27 (06/07 0350)    I/O last 3 completed shifts:   In: 360 [P.O.:360]  Out: 100 [Urine:100]    Intake/Output Summary (Last 24 hours) at 6/7/2020 1304  Last data filed at 6/6/2020 1857  Gross per 24 hour   Intake 240 ml   Output 100 ml   Net 140 ml

## 2020-06-08 NOTE — TELEPHONE ENCOUNTER
Called and spoke to patients  Montez Horvath he stated patient is currently in [facility] she was admitted through the ER at [facility] in [facility] and transported to [facility] on Saturday. The hospitalitis called Montez Horvath and advised him they have drained fluid off of Hailey abdomen her blood pressure is running low they are keeping an eye on it. Montez Horvath stated she is getting to be to much for him to take care of she takes her medication and he stated it's like she passes out. She is having falls and he is just can't handle on his own. He advised me he needs help he does not know if he should have someone come into the house and help take care of her or should he have her place in a nursing home.  He is going to call his insurance company to see what they will pay for

## 2020-06-08 NOTE — PROGRESS NOTES
CGA, and transitioned back into bed min A for lifting Rt LE onto bed. HOB elevated, and pt setup for self-feeding task. Pt able to eat meatloaf/mashed potatoes with setup for cutting up meatloaf. Pt left positioned for comfort in bed with all lines intact, all needs with in reach, and bed alarm on. RN notified of session, and that pt had rectal bleeding during toileting task. Assessment / Impression:    Patient's tolerance of treatment: Fair  Adverse Reaction: None  Significant change in status and impact: Similar presentation as during evaluation  Barriers to improvement: Cognitive impairments/poor carryover of learning      Plan for Next Session:    Continue per OT POC. Recommend SNF at discharge.     Time in: 1427  Time out: 1442  Timed treatment minutes: 15  Total treatment time: 15      Electronically signed by:    MARVA Denis/L, [place], [id number]

## 2020-06-08 NOTE — PROGRESS NOTES
quetiapine(patient had hallucinations in the past)  12. recently diagnosed splenic artery aneurysm  pt recently saw Dr Claudia Liu. 13. Moderate to severe protein calorie malnutrition with albumin 2.2    DVT Prophylaxis: Heparin  Diet: DIET LOW SODIUM 2 GM;  Code Status: Full Code      Dispo:  -Need kidneys to continue to improve. PT OT recommending SNF. Consult case management. Tess Joe MD  6/8/2020    Meds:   Meds:    sodium chloride flush  10 mL Intravenous 2 times per day    heparin (porcine)  5,000 Units Subcutaneous 3 times per day    donepezil  10 mg Oral Nightly    lactulose  20 g Oral TID    metoprolol tartrate  100 mg Oral BID    QUEtiapine  50 mg Oral Nightly    midodrine  5 mg Oral TID    octreotide  100 mcg Subcutaneous Q8H      Infusions:    0.9% NaCl with KCl 20 mEq Stopped (06/06/20 1425)     PRN Meds: sodium chloride flush, 10 mL, PRN  acetaminophen, 650 mg, Q6H PRN    Or  acetaminophen, 650 mg, Q6H PRN  polyethylene glycol, 17 g, Daily PRN  promethazine, 12.5 mg, Q6H PRN    Or  ondansetron, 4 mg, Q6H PRN        Data/Labs:     Recent Labs     06/05/20 1955 06/06/20  0126   WBC 8.5 8.6   HGB 10.6* 10.1*   HCT 32.8* 31.0*    140      Recent Labs     06/06/20 0126 06/07/20  0350 06/08/20  0516    141 141   K 3.5 4.1 4.0   CL 98* 107 108   CO2 26 27 27   PHOS  --  3.8 3.3   BUN 28* 30* 29*   CREATININE 3.2* 3.2* 2.6*     Recent Labs     06/06/20 0126 06/07/20  0350 06/08/20  0516   AST 28 23 25   ALT 14 12 12   BILITOT 0.6 0.7 0.6   ALKPHOS 84 63 67     Recent Labs     06/05/20 1955   INR 1.53     Recent Labs     06/05/20 1955 06/06/20 0126   TROPONINT 0.028* 0.019*     HgBA1c: No results found for: LABA1C  CALCIUM:  8.2/27 (06/08 0516)    I/O last 3 completed shifts:   In: 115 [P.O.:115]  Out: 450 [Urine:450]    Intake/Output Summary (Last 24 hours) at 6/8/2020 1025  Last data filed at 6/8/2020 0628  Gross per 24 hour   Intake 115 ml   Output 450 ml   Net -335 ml

## 2020-06-08 NOTE — PROGRESS NOTES
Physical Therapy    Facility/Department: [facility] 3E  Initial Assessment    NAME: Gracie Corrales  : 1935  MRN: [id number]    Date of Service: 2020    Discharge Recommendations:  Subacute/Skilled Nursing Facility        Assessment   Assessment: Pt is an 80 y.o. female with medical history, surgical history, co-morbidities, and personal factors including Hyperlipidemia, Hypertension, Hysterectomy; Cholecystectomy;  section; Skin cancer excision; Pacemaker insertion (Left, 2013); and Paracentesis (N/A, 2020) with admission for renal failure. Prior to admission, pt was modified independent with functional mobility and ADLs. Examination of body systems reveals decreased strength, decreased balance, decreased aerobic capacity, impaired cognition, and decreased independence with functional mobility.         Prognosis: Good  Decision Making: High Complexity  Clinical Presentation: unpredictable characteristics  PT Education: Goals;Transfer Training;Equipment;PT Role;Functional Mobility Training;Plan of Care;General Safety;Gait Training;Orientation  REQUIRES PT FOLLOW UP: Yes  Activity Tolerance  Activity Tolerance: Patient Tolerated treatment well         Restrictions  Restrictions/Precautions  Restrictions/Precautions: General Precautions, Fall Risk  Vision/Hearing  Vision: Within Functional Limits  Hearing: Exceptions to [facility]  Hearing Exceptions: Hard of hearing/hearing concerns     Subjective  General  Chart Reviewed: Yes  Patient assessed for rehabilitation services?: Yes  Family / Caregiver Present: No  Follows Commands: Impaired  Pain Screening  Patient Currently in Pain: Denies  Vital Signs  Patient Currently in Pain: Denies       Orientation  Orientation  Overall Orientation Status: Impaired  Orientation Level: Disoriented to time;Oriented to person  Social/Functional History  Social/Functional History  Lives With: Alone  Type of Home: House  Home Layout: Two level  Home Access: Stairs to enter with rails(\"Quite a few\")  Entrance Stairs - Rails: Right  Bathroom Shower/Tub: Walk-in shower  Bathroom Toilet: Standard  Bathroom Equipment: Grab bars in shower, Shower chair  Home Equipment: Rolling walker  Receives Help From: Home health  ADL Assistance: [address]: Needs assistance  Homemaking Responsibilities: No  Ambulation Assistance: Independent(uses RW)  Transfer Assistance: Independent  Active : No  Occupation: Retired  Cognition   Cognition  Overall Cognitive Status: Exceptions  Arousal/Alertness: Delayed responses to stimuli  Following Commands: Inconsistently follows commands  Attention Span: Attends with cues to redirect  Memory: Decreased short term memory;Decreased long term memory  Safety Judgement: Decreased awareness of need for safety;Decreased awareness of need for assistance  Problem Solving: Decreased awareness of errors  Insights: Decreased awareness of deficits  Initiation: Requires cues for some  Sequencing: Requires cues for some    Objective             Strength RLE  Comment: knee extension: 4-/5; ankle DF: 4/5  Strength LLE  Comment: knee extension: 4-/5; ankle DF: 4/5  Coordination  Finger to Nose: Dysmetric(BUEs)  Sensation  Overall Sensation Status: WNL  Bed mobility  Supine to Sit: Minimal assistance(with verbal and tactile cues for BUE and BLE placement throughout transfer; with HOB elevated; with increased time for task completion)  Transfers  Sit to Stand: Minimal Assistance(with verbal cues to push through bed/chair and avoid pulling on walker)  Stand to sit: Minimal Assistance(with verbal cues to feel chair against back of legs, reach back, and sit slowly)  Ambulation  Ambulation?: Yes  More Ambulation?: Yes  Ambulation 1  Surface: level tile  Device: No Device  Assistance: Minimal assistance; Moderate assistance  Quality of Gait: decreased gait speed, decreased step length bilaterally, ataxic, unsteady  Distance: 50 feet  Comments:

## 2020-06-08 NOTE — TELEPHONE ENCOUNTER
Please have  call and talk to the  at the hospital and let that person know-they can plan to discharge to nursing home before she leaves the hospital. He should be able to call the floor she is on and let the nurse or care manager know he needs to discuss discharge planning

## 2020-06-08 NOTE — PROGRESS NOTES
Occupational Therapy      [facility] OCCUPATIONAL THERAPY EVALUATION    Tiffany Mays, 1935, 3020/3020-A, 6/8/2020    Discharge Recommendation: Noel Merritt    History:  [place]:  There were no encounter diagnoses. Subjective:  Patient states: \"Getting old is rough\"  Pain: Pt denied pain this date  Communication with other providers: PT Rneuka Villasenor, RN [facility]  Restrictions: General Precautions, Fall Risk, Pulse Ox, Telemetry, Bed/Chair Alarm, Butler Catheter     Home Setup/Prior level of function:  Social/Functional History  Lives With: Alone  Type of Home: House  Home Layout: Two level  Home Access: Stairs to enter with rails(\"Quite a few\")  Entrance Stairs - Rails: Right  Bathroom Shower/Tub: Walk-in shower  Bathroom Toilet: Standard  Bathroom Equipment: Grab bars in shower, Shower chair  Home Equipment: Rolling walker  Receives Help From: Home health  ADL Assistance: Independent  Homemaking Assistance: Needs assistance  Homemaking Responsibilities: No  Ambulation Assistance: Independent(uses RW)  Transfer Assistance: Independent  Active : No  Occupation: Retired  Additional comments: Pt Poor historian this date. Examination:  · Observation: Supine in bed upon arrival. Agreeable to evaluation. A&O to only self. · Vision: WFL (glasses prn)   · Hearing: WFL  · Vitals: Stable vitals throughout session    Body Systems and functions:  · ROM: WFL in all major joints in BL UEs  · Strength: 3+/5 MMT in all major muscle groups   · Sensation: WFL (denies n/t)  · Tone: Normal  · Coordination: BUEs Dysmetric (finger to nose testing). Pt also needed redirected to task multiple times. · Perception: inappropriate use of objects, pt perseverated on brushing hair with toothbrush.      Activities of Daily Living (ADLs):  · Feeding: Setup (Anticipate assist to open containers/direct to task/ appropriate use of utensils)   · Grooming: Setup (completed oral hygiene with Ruby and max 6=100%(CN)]     Treatment:  Self Care Training:   Cues were given for safety, sequence, UE/LE placement, visual cues, and balance. Activities performed today included UB/LB dressing, oral hygiene, facial hygiene and grooming tasks of brushing hair. Safety Measures: Gait belt used, Left in Chair, Alarm in place    Assessment:  Pt is a 80 y.o. female with a past medical history of Hyperlipidemia and Hypertension. Pt admitted after a 2 falls, generalized weakness, and worsening confusion. Pt diagnosed with acute kidney injury and hypokalemia. Pt lives at home, but poor historian this date for PLOF. Pt has above impairments and would benefit from continued OT services in a SNF. Complexity: Moderate  Prognosis: Good  Plan: 3x/week      Goals:  1. Pt will complete all aspects of bed mobility for EOB/OOB ADLs SBA w/HOB flat   2. Pt will complete UB/LB bathing Min A w/ long handled sponge prn and min cues   3. Pt will complete all aspects of LB dressing Min A w/AE prn and min cues  4. Pt will complete all functional transfers to and from bed, chair, toilet, shower chair SBA w/RW and good safety/awareness  5. Pt will ambulate HH distance to bathroom for toileting SBA w/ RW  6. Pt will complete all aspects of toileting task Min A for balance during mgmt of clothing/marisa-care  7. Pt will complete oral hygiene/grooming routine in standing at sink Min A and min cues  8. Pt will complete ther ex/ther act with focus on dynamic standing tolerance >3min and UE strengthing for functional use.       Time:   Time in: 1131  Time out: 1158  Timed treatment minutes: 17  Total time: 27      Electronically signed by:   Zoila Thomas, S/OT  MARVA Valdez/L, [place] .[id number]

## 2020-06-09 NOTE — PLAN OF CARE
RN  Outcome: Ongoing  6/9/2020 0542 by Tiffanie Donovan  Outcome: Ongoing     Problem: Cardiac Output - Decreased:  Goal: Hemodynamic stability will improve  Description: Hemodynamic stability will improve  Outcome: Ongoing     Problem: Falls - Risk of:  Goal: Will remain free from falls  Description: Will remain free from falls  Outcome: Ongoing  Goal: Absence of physical injury  Description: Absence of physical injury  Outcome: Ongoing     Problem: Urinary Elimination:  Goal: Signs and symptoms of infection will decrease  Description: Signs and symptoms of infection will decrease  Outcome: Ongoing  Goal: Complications related to the disease process, condition or treatment will be avoided or minimized  Description: Complications related to the disease process, condition or treatment will be avoided or minimized  Outcome: Ongoing  Goal: Skin integrity will improve  Description: Skin integrity will improve  Outcome: Ongoing  Goal: Ability to recognize the need to void and respond appropriately will improve  Description: Ability to recognize the need to void and respond appropriately will improve  Outcome: Ongoing  Goal: Will remain free from infection  Description: Will remain free from infection  Outcome: Ongoing  Goal: Incidence of incontinence will decrease  Description: Incidence of incontinence will decrease  Outcome: Ongoing     Problem: Health Behavior:  Goal: Compliance with treatment plan for underlying cause of condition will improve  Description: Compliance with treatment plan for underlying cause of condition will improve  Outcome: Ongoing  Goal: Identification of resources available to assist in meeting health care needs will improve  Description: Identification of resources available to assist in meeting health care needs will improve  Outcome: Ongoing     Problem: Fluid Volume:  Goal: Maintenance of adequate hydration will improve  Description: Maintenance of adequate hydration will

## 2020-06-09 NOTE — PROGRESS NOTES
Department of Internal Medicine  General Internal Medicine  Attending Progress Note      SUBJECTIVE:  No asterixis today. Not sure if she had it yesterday. The nurse says the mental status is better. It may be more of a nighttime issue.       OBJECTIVE      Medications    Current Facility-Administered Medications: sodium chloride flush 0.9 % injection 10 mL, 10 mL, Intravenous, 2 times per day  sodium chloride flush 0.9 % injection 10 mL, 10 mL, Intravenous, PRN  acetaminophen (TYLENOL) tablet 650 mg, 650 mg, Oral, Q6H PRN **OR** acetaminophen (TYLENOL) suppository 650 mg, 650 mg, Rectal, Q6H PRN  polyethylene glycol (GLYCOLAX) packet 17 g, 17 g, Oral, Daily PRN  promethazine (PHENERGAN) tablet 12.5 mg, 12.5 mg, Oral, Q6H PRN **OR** ondansetron (ZOFRAN) injection 4 mg, 4 mg, Intravenous, Q6H PRN  heparin (porcine) injection 5,000 Units, 5,000 Units, Subcutaneous, 3 times per day  0.9% NaCl with KCl 20 mEq infusion, , Intravenous, Continuous  donepezil (ARICEPT) tablet 10 mg, 10 mg, Oral, Nightly  lactulose (CHRONULAC) 10 GM/15ML solution 20 g, 20 g, Oral, TID  metoprolol tartrate (LOPRESSOR) tablet 100 mg, 100 mg, Oral, BID  QUEtiapine (SEROQUEL) tablet 50 mg, 50 mg, Oral, Nightly  midodrine (PROAMATINE) tablet 5 mg, 5 mg, Oral, TID  octreotide (SANDOSTATIN) injection 100 mcg, 100 mcg, Subcutaneous, Q8H    Physical    VITALS:  BP (!) 164/66   Pulse 91   Temp 97.4 °F (36.3 °C) (Oral)   Resp 16   Ht 5' (1.524 m)   Wt 105 lb 6 oz (47.8 kg)   SpO2 96%   BMI 20.58 kg/m²   Gen - a & O x3   HEENT-  PERRLA, EOMI  CV - RRR no M/G/R, normal s1, s2  Lungs - CTA bilaterally no W/C/R  Abd - soft, NT, ND  Ext - no cyanosis or edema    Data    CBC:   Lab Results   Component Value Date    WBC 8.6 06/06/2020    RBC 3.18 06/06/2020    HGB 9.1 06/08/2020    HCT 29.5 06/08/2020    MCV 97.5 06/06/2020    MCH 31.8 06/06/2020    MCHC 32.6 06/06/2020    RDW 16.2 06/06/2020     06/06/2020    MPV 11.8 06/06/2020

## 2020-06-09 NOTE — PROGRESS NOTES
Impression:       Patient's tolerance of treatment:  Good; patient tolerated ambulation in room today   Adverse Reaction: none  Significant change in status and impact:  none  Barriers to improvement:  Impaired cognition; decreased safety awareness; generalized weakness    Plan for Next Session:    Continue progressing toward goals per plan of care. Progress independence with transfers and ambulation as tolerated and appropriate. Progress ambulation distance as tolerated and appropriate. Time in:  1135  Time out:  1229  Timed treatment minutes:  54  Total treatment time:  47    Previously filed items:  Social/Functional History  Lives With: Alone  Type of Home: House  Home Layout: Two level  Home Access: Stairs to enter with rails(\"Quite a few\")  Entrance Stairs - Rails: Right  Bathroom Shower/Tub: Walk-in shower  Bathroom Toilet: Standard  Bathroom Equipment: Grab bars in shower, Shower chair  Home Equipment: Rolling walker  Receives Help From: Home health  ADL Assistance: Independent  Homemaking Assistance: Needs assistance  Homemaking Responsibilities: No  Ambulation Assistance: Independent(uses RW)  Transfer Assistance: Independent  Active : No  Occupation: Retired     Long term goals  Long term goal 1: In one week, pt will complete all bed mobility with SBAx1  Long term goal 2: In one week, pt will complete sit <> stand transfers with SBAx1  Long term goal 3: In one week, pt will ambulate 250 feet with SBAx1 with LRAD  Long term goal 4:  In one week, pt will independently complete 3 sets of 10 reps of BLE AROM exercises in available and allowed ROM    Electronically signed by:      Eric Stewart PT, DPT  License #: [id number]

## 2020-06-09 NOTE — CONSULTS
Significant for history of hypertension,  hyperlipidemia, dementia, sick sinus syndrome with pacemaker in place in  2013 and pulmonary hypertension. FAMILY HISTORY:  Noncontributory. MEDICATIONS:  Please refer to the chart. SOCIOECONOMIC HISTORY:  No history of EtOH abuse. The patient does not  smoke cigarettes. PAST SURGICAL HISTORY:  The patient has had pacemaker placed on  2013, hysterectomy, cholecystectomy,  and skin cancer  removed. ALLERGIES:  No known drug allergies. PHYSICAL EXAMINATION:  GENERAL:  Shows an 49-year-old white female of thin built and fair  nutritional status for her age, who is lying flat in bed, in no acute  distress. She is drowsy, but does respond to verbal commands, but is  not oriented. VITAL SIGNS:  Please refer to the chart. HEENT:  Shows skull to be atraumatic. NECK:  Supple. CHEST:  Clear. HEART:  S1 and S2 are normal.  ABDOMEN:  Soft, slightly distended secondary to ascites, nontender. Liver and spleen are not palpable. Bowel sounds are present. RECTAL:  Deferred. CNS:  Shows the patient to be drowsy, but does respond to verbal  commands. The patient is moving all 4 extremities. MUSCULOSKELETAL SYSTEM:  Unremarkable. LABORATORY DATA:  As above mentioned. IMPRESSION:  1. An 49-year-old white female with multiple comorbidities, recently  diagnosed to have chronic liver disease, etiology to be determined with  stigmata of portal hypertension, i.e. ascites, status post large volume  paracentesis x2.  2.  History of anemia, rule out GI bleeding. RECOMMENDATIONS:  1. Agree with present management. 2.  2-gm salt diet. 3.  The patient will need a screening EGD to rule out esophageal/gastric  varices and a screening colonoscopy at a later date as well. 4.  Since the patient's hepatitis A and B serology is negative, the  patient will need vaccination for hepatitis A and B as well. 5.  No need for liver biopsy at this point.   6.

## 2020-06-10 NOTE — PROGRESS NOTES
108 06/10/2020    MPV 13.0 06/10/2020       ASSESSMENT AND PLAN        1. Acute kidney injury: - much better creatinine now  Urinary: Hyaline casts. Nephrology following.  -Creatinine improving      2. Detectable troponin:  Likely due to to OREN. Denied any chest pain, shortness of breath-unreliable  Troponins trending down. 3. Hypokalemia  Replace       4. Hypomagnesemia  Monitor      5. Multiple falls :  PT/OT evaluation .  Patient has physical therapy at home. 6. decompensated liver cirrhosis with ascites:  Patient had paracentesis 5/11/2020-7440 ml ascitic fluid tapped.  -Had paracentesis today: 6.4 L removed. Abdominal distention improved. 7. Hypertension  Patient is on amlodipine 5 mg daily, losartan 100 mg daily, metoprolol 100 mg twice daily  Continue metoprolol 100 mg twice daily  DC amlodipine. Hold losartan, Lasix, spironolactone. Monitor blood pressure closely      8. Sclerotic lesion of right proximal humeral diaphysis   x-ray right shoulder shows no lesion. Likely artifact on previous x-ray. 9. status post pacemaker placement due to third-degree heart block (4/2013)      10. pulmonary hypertension      11. Severe dementia - no asterixis  Continue donepezil, quetiapine(patient had hallucinations in the past)      12. recently diagnosed splenic artery aneurysm  pt recently saw Dr Joan Monaco. 13. Delirium - she had agitation around 6-7PM yesterday. Did not have overnight. Monitor today. If she is okay consider discharge tomorrow.       Bernardo Anthony MD, MSc

## 2020-06-10 NOTE — PROGRESS NOTES
to see what kind of prolapse she has   4. She has balbuena now   5. Watch UOP  6. Low salt  7. Follow clinically  8. If all can be done today  - may d/C with loop/MRA  9. BMP, mg weekly X 2   10.  F.u with me in 2 wks       Desean Welch MD

## 2020-06-10 NOTE — CARE COORDINATION
Received call back from patient  who reiterated plan of patient to return home with him and [facility] at discharge. CM following.

## 2020-06-11 NOTE — PROGRESS NOTES
Physical Therapy    Physical Therapy Treatment Note  Name: Delores Méndez MRN: [id number] :   1935   Date:  2020   Admission Date: 2020 Room:  30203020-A   Restrictions/Precautions:  Restrictions/Precautions  Restrictions/Precautions: General Precautions, Fall Risk         Subjective:  Pain:   Location, Type, Intensity (0/10 to 10/10):  denies    Objective:    Observation:  Pt supine in bed upon entry    Treatment, including education/measures:  Pt agreeable to participating in therapy at this time. Therapeutic Activity Training:   Therapeutic activity training was instructed today. Cues were given for safety, sequence, UE/LE placement, awareness, and balance. Activities performed today included bed mobility training, sup-sit, sit-stand. Pt completed supine to sit with CGAx1 with HOB elevated and with increased time for task completion. Pt completed sit to stand from bed with CGAx1 with verbal cues to push through bed and avoid pulling on walker. Pt completed stand to sit onto chair with minAx1 with verbal cues to feel chair against back of legs, reach back, and sit slowly. Pt completed sit to stand from chair with minAx1 with verbal cues to push through chair and avoid pulling on walker. Pt completed stand to sit onto chair with minAx1 with verbal cues to feel chair against back of legs, reach back, and sit slowly. Gait Training:  Cues were given for safety, sequence, device management, balance, posture, awareness, path. Pt ambulated 40 feet + 40 feet with assist varying from CGAx1 to minAx1 with a front wheeled walker with a decreased gait speed, a decreased step length bilaterally, and an unsteady gait. Pt provided with verbal and tactile cues for BLE placement, walker placement, and sequence throughout ambulation. Pt provided with verbal and tactile cues to maintain upright posture in order to avoid COM shifting outside of RK.   Pt provided with verbal cues to practice

## 2020-06-11 NOTE — DISCHARGE INSTR - COC
Continuity of Care Form    Patient Name: Delores Méndez   :  1935  MRN:  [id number]    Admit date:  2020  Discharge date:  ***    Code Status Order: Full Code   Advance Directives:   Advance Care Flowsheet Documentation     Date/Time Healthcare Directive Type of Healthcare Directive Copy in [address] Agent's Name Healthcare Agent's Phone Number    [phone]  No, patient does not have an advance directive for healthcare treatment -- -- -- -- --          Admitting Physician:  Jazmin Acevedo MD  PCP: Margret Bains PA-C    Discharging Nurse: [facility] Unit/Room#: 3020/3020-A  Discharging Unit Phone Number: ***    Emergency Contact:   Extended Emergency Contact Information  Primary Emergency Contact: Grace Browne  Address: [address], [address] [place] [address] Phone: [phone]  Mobile Phone: [phone]  Relation: Spouse    Past Surgical History:  Past Surgical History:   Procedure Laterality Date     SECTION      CHOLECYSTECTOMY      HYSTERECTOMY      PACEMAKER INSERTION Left 2013    PARACENTESIS N/A 2020    IR    SKIN CANCER EXCISION         Immunization History:   Immunization History   Administered Date(s) Administered    Hepatitis A Adult (Havrix, Vaqta) 2020    Hepatitis B Ped/Adol (Engerix-B, Recombivax HB) 2020    Influenza 10/15/2013    Influenza Virus Vaccine 2014, 2015    Influenza, Quadv, IM, (6 mo and older Fluzone, Flulaval, Fluarix and 3 yrs and older Afluria) 2017    Influenza, Quadv, IM, PF (6 mo and older Fluzone, Flulaval, Fluarix, and 3 yrs and older Afluria) 2016, 10/18/2018, 10/04/2019    Pneumococcal Conjugate 13-valent (Qczamuq76) 2015    Pneumococcal Polysaccharide (Oylmdkqtf29) 2008    Tdap (Boostrix, Adacel) 2017       Active Problems:  Patient Active Problem List   Diagnosis Code    {Prognosis:1582072536}    Recommended Labs or Other Treatments After Discharge: ***    Physician Certification: I certify the above information and transfer of Jason Lagunas  is necessary for the continuing treatment of the diagnosis listed and that she requires {Admit to Appropriate Level of Care:77424} for {GREATER/LESS:753786320} 30 days.      Update Admission H&P: {CHP DME Changes in HHAYK:155605766}    PHYSICIAN SIGNATURE:  {Esignature:520174278}

## 2020-06-11 NOTE — PROGRESS NOTES
behavior,    Plan for Next Session:    Continue with OT POC  Time in:  1029  Time out:  1044  Timed treatment minutes:  15  Total treatment time:  15  Electronically signed by:    LISA Washburn  6/11/2020, 10:29 AM    Previously filed values:

## 2020-06-11 NOTE — DISCHARGE SUMMARY
LABGLOM 39 06/11/2020    GLUCOSE 101 06/11/2020    PROT 6.0 06/11/2020    LABALBU 2.7 06/11/2020    CALCIUM 8.4 06/11/2020    BILITOT 0.7 06/11/2020    ALKPHOS 69 06/11/2020    AST 26 06/11/2020    ALT 12 06/11/2020         Exam:   Wt Readings from Last 3 Encounters:   06/11/20 99 lb 11.2 oz (45.2 kg)   06/05/20 105 lb (47.6 kg)   05/21/20 115 lb (52.2 kg)       Blood pressure (!) 147/69, pulse 60, temperature 98.3 °F (36.8 °C), temperature source Axillary, resp. rate 17, height 5' (1.524 m), weight 99 lb 11.2 oz (45.2 kg), SpO2 94 %, not currently breastfeeding. General - AAO x 3  Psych - Appropriate affect/speech. No agitation  Eyes - Eye lids intact. No scleral icterus  ENT - Lips wnl. External ear clear/dry/intact. No thyromegaly on inspection  Neuro - No gross peripheral or central neuro deficits on inspection  Heart - Sinus. RRR. S1 and S2 present. No elevated JVD appreciated  Lung - Adequate air entry b/l, No crackles/wheezes appreciated  GI -  Soft. No guarding/rigidity. No hepatosplenomegaly/ascites. BS+   - No CVA/suprapubic tenderness or palpable bladder distension  Skin - Intact. No rash/petechiae/ecchymosis.  Warm extremities        Significant Diagnostic Studies:   CBC:   Recent Labs     06/09/20  1023 06/09/20  1810 06/10/20  0448   WBC  --   --  9.1   HGB 9.8* 11.0* 9.9*   PLT  --   --  108*     WBC   Date/Time Value Ref Range Status   06/10/2020 04:48 AM 9.1 4.0 - 10.5 K/CU MM Final   06/06/2020 01:26 AM 8.6 4.0 - 10.5 K/CU MM Final   06/05/2020 07:55 PM 8.5 4.0 - 10.5 K/CU MM Final     Hemoglobin   Date/Time Value Ref Range Status   06/10/2020 04:48 AM 9.9 (L) 12.5 - 16.0 GM/DL Final   06/09/2020 06:10 PM 11.0 (L) 12.5 - 16.0 GM/DL Final   06/09/2020 10:23 AM 9.8 (L) 12.5 - 16.0 GM/DL Final     Platelets   Date/Time Value Ref Range Status   06/10/2020 04:48  (L) 140 - 440 K/CU MM Final   06/06/2020 01:26  140 - 440 K/CU MM Final   06/05/2020 07:55  140 - 440 K/CU MM Final

## 2020-06-12 NOTE — CARE COORDINATION
Bala 45 Transitions Initial Follow Up Call    Call within 2 business days of discharge: Yes    Patient: Radha Tijerina Patient : 1935   MRN: [id number]  Reason for Admission: OREN, Decomp Liver Cirrhosis  Discharge Date: 20 RARS: Readmission Risk Score: 22  Facility: [address] [place] [address]       Complaint Diagnosis Description Type Department Provider    20   Admission (Discharged) Kael Browne MD        CARE TRANSITION   Spoke w/ Arnoldo Justice,  as Patient resting. Reports Patient doing well since home, \"making more sense\". Reports significant decrease in edema. Voiding qs with clear yellow urine. No reported or noted dizziness, sob, distress. Received copy of AVS, reviewed w/ . It is noted that new rx for Aldactone, MagOx and Demadex were sent to [place] [address]; informed  I will request rx transfer to [facility] once open. Education provided re: new and changed rx; verbalizes understanding. 1111F completed. [address] [place] [address] Rd RN intake visit scheduled for this am. Instructed  to contact [address] [place] [address] Rd  re: any health concerns, rx questions. Confirmed he has agency number. Denies other resource needs including dme. Confirmed transportation for 2020 2pm appt w/ Catrachito Ruiz PAC. Instructed to contact PCP  w/ health concerns. Discussed benefits of Telehealth, [facility], Urgent Care, Flu Clinic. Agreeable to ongoing CT follow up.     [address]-- unable to transfer rx from [place] [address], will need rx directed submitted to Jazlyn Soniya Jules as d/c MD not on today w/ request to have Aldactone, Demadex and MagOx rx re-submitted to [facility];  Dr Hinds Buys to complete. [address] Arnoldo Justice. Discussed above, instructed to contact [address] for rx  time.       Follow Up  Future Appointments   Date Time Provider Kanwal Gomez   2020  2:00 PM Bogdan Kruger PA-C [facility] PEDS [facility] Yamile Huerta RN

## 2020-06-15 NOTE — TELEPHONE ENCOUNTER
Cheri Sauer a home care nurse for [facility] called regarding Alba Soulier. She stated that she was taken to the hospital for Mental statis changes and was Dx. With Dehydration. She is requesting to stop PT and OT at this time.

## 2020-06-15 NOTE — TELEPHONE ENCOUNTER
I called Angela Crowell back and advised her of this but she states that her message was to continue PT/OT and nursing not to stop it. I gave verbal order continue.

## 2020-06-16 NOTE — CARE COORDINATION
Bala 45 Transitions Follow Up Call    2020    Patient: Magdy Barreto  Patient : 1935   MRN: [id number]  Reason for Admission:   OREN  Discharge Date: 20 RARS: Readmission Risk Score: 25         Spoke with:  Patient spouse    Care Transitions Subsequent and Final Call    Subsequent and Final Calls  Do you have any ongoing symptoms?:  No  Have your medications changed?:  No  Do you have any questions related to your medications?:  No  Do you currently have any active services?:  Yes  Are you currently active with any services?:  Home Health  Do you have any needs or concerns that I can assist you with?:  No  Identified Barriers:  None  Care Transitions Interventions   Home Care Waiver:  Completed        DME Assistance:  Declined   Other Interventions: Follow Up:  Spoke with patient spouse briefly. Patient spouse reports that patient is \"doing really good\". Reports that patient LOC is at baseline; appetite has improved. Denies issue with urination. Patient spouse confirms that patient was able to obtain all of her prescriptions. No questions reported. Reports that patient has received follow up from RN, PT and is currently with Speech Therapy. Reports that patient is Donelda Blase a busy day\". Denies patient needs. CTN contact information provided if patient needs arise.    Future Appointments   Date Time Provider Kanwal Gomez   2020  2:00 PM Wilner Wilson PA-C [facility] JOSEFINA Garcia Ala, RN

## 2020-06-22 PROBLEM — N81.10 FEMALE BLADDER PROLAPSE: Status: ACTIVE | Noted: 2020-01-01

## 2020-06-22 NOTE — PROGRESS NOTES
Magdy Barreto  1935  80 y.o.  female    SUBJECTIVE:    Chief Complaint   Patient presents with    Follow-up     1 month follow up, pt has had bleeding from genitals area,  not sure if it is coming from her vagina or urinary tract.  also states that her stomach seems more distended than usual.        HPI   Pt here for recheck s/p hospitalization for confusion/falls. Seen in ER 2020 and discharged 2020. Pt admitted with OREN (losartan stopped), pt seen by nephrology but has not had recheck yet.  states he didn't know who he was supposed to set appts with once she got home and couldn't find info in her discharge papers. Spironolactone increased last week to bid for elevated K after labs faxed to Dr. Kathleen Delatorre. Pt does have [facility] in home and is working with PT/OT. Pt was to f/u with dr. Krupa Macias due to chronic liver failure/acsites but  has not made that appt yet either.  reports pt is having some bleeding from her vaginal area off and on over last month. Did have a recent UTI but pt does not c/o of symptoms today. HTN-The patient is taking hypertensive medications compliantly without side effects. Denies chest pain, dyspnea, edema, or TIA's.       No Known Allergies    Past Medical History:   Diagnosis Date    Hyperlipidemia     Hypertension        Past Surgical History:   Procedure Laterality Date     SECTION      CHOLECYSTECTOMY      HYSTERECTOMY      PACEMAKER INSERTION Left 2013    PARACENTESIS N/A 2020    IR    SKIN CANCER EXCISION         Social History     Socioeconomic History    Marital status:      Spouse name: None    Number of children: None    Years of education: None    Highest education level: None   Occupational History    None   Social Needs    Financial resource strain: None    Food insecurity     Worry: None     Inability: None    Transportation needs     Medical: None     Non-medical: None   Tobacco Use    Normal rate and regular rhythm. Heart sounds: Normal heart sounds. Pulmonary:      Effort: Pulmonary effort is normal.      Breath sounds: Normal breath sounds. Abdominal:      General: Abdomen is protuberant. Bowel sounds are normal. There is distension. Palpations: Abdomen is soft. Tenderness: There is no abdominal tenderness. Genitourinary:     General: Normal vulva. Uterus: Absent. Comments: Prolapsed bladder  Musculoskeletal:      Right lower leg: Edema present. Left lower leg: Edema present. Comments: Mild swelling noted robyn ankles   Neurological:      Mental Status: She is alert. Psychiatric:         Attention and Perception: She is inattentive. Cognition and Memory: Cognition is impaired. Memory is impaired. She exhibits impaired recent memory and impaired remote memory. ASSESSMENT/PLAN:    Problem List        Circulatory    HTN (hypertension)    Relevant Medications    metoprolol (LOPRESSOR) 100 MG tablet       Digestive    Cirrhosis of liver ([facility] [address] - Primary     Needs f/u with Dr. Jim Lindsey,  has not called yet for f/u appt s/p discharge. Advised he needs to call today for appt. Voices understanding  Discussed at length with  continuing decline in condition, possible need for hospice once done with PT/OT.  reluctant to consider at this time         Relevant Medications    UNABLE TO FIND    UNABLE TO FIND       Genitourinary    Renal failure     Recent labs show elevated K, spironolactone adjusted to bid by Dr. Josue Horvath. Instructed  pt needs f/u appt with nephrology also s/p discharge. Voices understanding and state he will call today for appt.          Female bladder prolapse     Will refer to gyn for evaluation,  advised pt may be poor candidate for surgery, voices understanding         Relevant Orders    POCT Urinalysis no Micro    AFL - Clifford Mosher MD, Gynecology, [place]       Other    Other ascites     Needs f/u

## 2020-06-23 NOTE — CARE COORDINATION
Bala 45 Transitions Follow Up Call    2020    Patient: Saurabh May  Patient : 1935   MRN: [id number]  Reason for Admission: OREN, Decomp Liver Cirrhosis  Discharge Date: 20 RARS: Readmission Risk Score: 22    Care Transitions Subsequent and Final Call    Schedule Follow Up Appointment with PCP:  Declined  Subsequent and Final Calls  Do you have any ongoing symptoms?:  Yes  Onset of Patient-reported symptoms:  Other  Patient-reported symptoms:  Other  Interventions for patient-reported symptoms:  Other  Have your medications changed?:  Yes  Patient Reports:  2020 + Mag Ox  Do you have any questions related to your medications?:  No  Do you currently have any active services?:  Yes  Are you currently active with any services?:  Home Health  Identified Barriers: Other, Stress  Care Transitions Interventions   Home Care Waiver:  Completed        DME Assistance:  Declined   Other Interventions:            CARE TRANSITION  Spoke w/ Rich Joneser,  for follow up call as Patient resting. Reports Patient voiding qs with occasional incontinence. Denies abd/flank/sp pain, fever or burning upon urination. Patient was seen by PCP yesterday and dx'd with Bladder Prolapse causing bleeding. Patient has appt w/ Dr Whitaker Ra 2020, confirmed transportation. Per notes:  CMP results showing low K+. Reviewed with Dr Norman Funes order received to have her increase Spironolactone 25 mg to 2 times per day and increase potssium foods in diet. Discussed above w/  who confirms Patient has increased Spironolactone as directed and increased potassium foods in diet. Encourage the following:  · Plan diet around foods that are rich in potassium. Fresh, unprocessed whole foods have the most. These foods include:  ? Milk and other dairy products. ? Vegetables, especially broccoli, cooked dry beans, tomatoes, potatoes, artichokes, winter squash, and spinach.   ? Fruits, especially citrus fruits,

## 2020-06-23 NOTE — ASSESSMENT & PLAN NOTE
Recent labs show elevated K, spironolactone adjusted to bid by Dr. Anni Garcia. Instructed  pt needs f/u appt with nephrology also s/p discharge. Voices understanding and state he will call today for appt.

## 2020-07-02 NOTE — CARE COORDINATION
Bala 45 Transitions Follow Up Call    2020    Patient: Yovany Patch  Patient : 1935   MRN: [id number]  Reason for Admission:   OREN  Discharge Date: 20 RARS: Readmission Risk Score: 22      Follow Up: Attempted patient contact. Phone rings for extended period with no answer. No VM available. Attempted patient spouse; user busy x 2.     Future Appointments   Date Time Provider Kanwal Gomez   2020  1:45 PM Chelsy Whatley MD [facility] JENNIFER Moore

## 2020-07-10 NOTE — PROGRESS NOTES
Surgery:  [email or web address]                        Arrival time:0730     Arrive at the front information desk -1st floor /[facility] is on [address]  Please leave money and all other valuables at home. Wear comfortable clothing. If you wear contacts please bring a case. No make up. You may be asked to remove rings or body piercing. Please bring insurance cards and picture ID am of procedure. Please bring any consent or paper work from your doctor. If you become ill,such as a cold, sore throat or fever contact your doctor. Please bathe or shower am of procedure. Medications to take AM of procedure:     Any questions call [facility]  [phone]    Pt had COVID test done at [facility] 7/8/20 and I will call for results.

## 2020-07-14 NOTE — H&P
CHIEF COMPLAINT:  Here for route ine paracentesis    History Obtained From:  As above    Past Medical History:    Recurrent asites    Allergies:  Patient has no known allergies.   REVIEW OF SYSTEMS:    +asites    PHYSICAL EXAM:    Vitals:  BP (!) 166/74   Pulse 66   Temp 98.8 °F (37.1 °C) (Temporal)   Resp 18   Ht 5' 2\" (1.575 m)   Wt 102 lb (46.3 kg)   SpO2 98%   BMI 18.66 kg/m²       Elderly female in nad resting on bed  +asites       ASSESSMENT AND PLAN:      US guided paracentesis

## 2020-07-14 NOTE — PROCEDURES
Skin tear noted on right forearm on arrival to IR. Will address with nurse in [facility].

## 2020-07-14 NOTE — PROCEDURES
1000   Received in IR for paracentesis. Informed consent obtained from spouse. Monitors applied. Vital signs taken and stable. 1008   Needle puncture to rt abdomen. Clear yellow fluid draining well. 1018   Resting quietly. Fluid continues to drain well. 1037   Procedure completed. Vital signs remain stable. A total of 5700 cc of clear yellow fluid removed from rt abdomen. Pt tolerated procedure well. Albumin 50 gms given during procedure. See MAR for details.

## 2020-07-20 NOTE — CARE COORDINATION
[facility] Transitions Follow Up Call    2020    Patient: Akiko Ingram  Patient : 1935   MRN: [id number]  Reason for Admission: OREN, Liver Cirrhosis   Discharge Date: 20 RARS: Readmission Risk Score: 22    Care Transitions Subsequent and Final Call    Schedule Follow Up Appointment with PCP:  Declined  Subsequent and Final Calls  Do you have any ongoing symptoms?:  Yes  Onset of Patient-reported symptoms:  Other  Patient-reported symptoms:  Other  Interventions for patient-reported symptoms:  Other  Have your medications changed?:  No  Do you have any questions related to your medications?:  No  Do you currently have any active services?:  Yes  Are you currently active with any services?:  Home Health  Do you have any needs or concerns that I can assist you with?:  No  Identified Barriers: Other, Stress, Lack of Support  Care Transitions Interventions  Adult Day Program:  Declined       Home Care Waiver:  Declined  Other Services:  Declined    Hospice:  Declined        DME Assistance:  Declined     Senior Services:  Declined     Palliative Care:  Declined     Other Interventions: Follow Up  Future Appointments   Date Time Provider Kanwal Gomez   2020  2:30 PM Dorothea Canas MD [facility]       CARE TRANSITIONS/COVID19 RISK MONITORING  COVID19 SCREEN: 2020 Not detected  PATIENT RISK FACTORS: Age, Dementia    Spoke w/ Vinh Peralta,  for follow up call. Reports Patients \"breathing better, they took fluid off her 4 times\". Last noted paracentesis on 2020 for recurrent ascites. Reports Patient voiding qs; no abd pain. Reports \"dementia is worse, she doesn't know much whats going on anymore\". Reports [address] [place] [address] Rd services have ended. Denies need for additional [place] [address] services. Reports he cares for Patient himself. Discussed benefits of Palliative Care, USS, Aide services, Alzheimer's [employer].  Discussed importance of support to help prevent/avoid caregiver burnout.  not interested in referrals at this time. Encouraged him to contact me if need arises. States \"Im doing pretty good now\". Support given. Denies dme, rx needs. Instructed to contact PCP 24/7 re: any health concerns. Discussed benefits of Telehealth, Flu Clinic, [facility], Urgent Care. Denies need for ongoing CT follow up; has CTN number should needs arise. Episode closed, no further outreach planned. Prior to ending call reviewed the following :    From [facility]: Are you at higher risk for severe illness?  Wash your hands often.  Avoid close contact (6 feet, which is about two arm lengths) with people who are sick.  Put distance between yourself and other people if COVID-19 is spreading in your community.  Clean and disinfect frequently touched surfaces.  Avoid all cruise travel and non-essential air travel.  Call your healthcare professional if you have concerns about COVID-19 and your underlying condition or if you are sick. * Wear mask over nose and mouth when unable to social distance unless contraindicated.     For more information on steps you can take to protect yourself, see CDC's How to [address], RN

## 2020-07-24 NOTE — ED NOTES
Order put in for Sitter at bedside. Ask supervisor if we could get sitter she stated we do not use sitter for high fall risk pt. She offered video monitoring and sent machine down.   Pt is in radiology at this time     Sarahi Landry RN  07/24/20 0251

## 2020-07-25 PROBLEM — G93.40 ACUTE ENCEPHALOPATHY: Status: ACTIVE | Noted: 2020-01-01

## 2020-07-25 NOTE — ED NOTES
Pt moved to room 6 to be closer to nurses station for closer monitoring.      Kalyani Antunez RN  07/24/20 2001

## 2020-07-25 NOTE — CONSULTS
[address] [facility]  Gastroenterology Consultation    2020  1:07 PM    Patient:    Yakelin Zurita  : 1935   80 y.o. MRN: [id number]  Admitted: 2020  5:41 AM ATT: Pia Goodwin MD   [id number]  AdmitDx: Acute encephalopathy [G93.40]  PCP: April Ospina PA-C    Reason for Consult:  Cirrhosis, Encephalopathy     Impression and RECOMMENDATIONS:    Decompensated cirrhosis with Hepatic Encephalopathy and ascitis  Also has dementia  Continue lactulose  Continue Xifaxan  Can consider paracentesis  May need long term placement    Patient Active Problem List   Diagnosis Code    Cardiac pacemaker Z95.0    Hyperlipidemia with target LDL less than 100 E78.5    HTN (hypertension) I10    Pulmonary hypertension ([address] I27.20    Recurrent major depressive disorder, in partial remission ([address] F33.41    Late onset Alzheimer's disease with behavioral disturbance (HCC) G30.1, F02.81    Age-related osteoporosis without current pathological fracture M81.0    Abnormal CT of the head R93.0    Meningioma (HCC) D32.9    Hallucinations due to late onset dementia ([address] F03.90, R44.3    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 female with significant past medical history as below who presents with above mentioned causes in reason for consult. Has multiple medical issues  Admitted with confusion.  Has dementia  Has cirrhosis decompensated  Liver work up neg as per Dr Heraclio Lloyd  confused      Past Medical History:        Diagnosis Date    Arthritis     Dementia ([address]     Hyperlipidemia     Hypertension     MDRO (multiple drug resistant organisms) resistance     MRSA? Past Surgical History:        Procedure Laterality Date     SECTION      CHOLECYSTECTOMY      HYSTERECTOMY      PACEMAKER INSERTION Left 2013    PACEMAKER PLACEMENT      PARACENTESIS N/A 2020    IR    SKIN CANCER EXCISION           Current Medications:    Medications    Prior to Admission medications    Medication Sig Start Date End Date Taking?  Authorizing Provider   Multiple Vitamins-Minerals (MULTIVITAMIN ADULTS PO) Take 1 tablet by mouth daily   Yes Historical Provider, MD   Omega-3 Fatty Acids (FISH OIL PO) Take 1,000 mg by mouth daily as needed   Yes Historical Provider, MD   lactulose (CHRONULAC) 10 GM/15ML solution Take 30 g by mouth daily   Yes Historical Provider, MD   spironolactone (ALDACTONE) 50 MG tablet Take 1 tablet by mouth 2 times daily 20  Yes Efraín Kwan MD   magnesium oxide (MAG-OX) 400 (240 Mg) MG tablet Take 1 tablet by mouth daily  Patient taking differently: Take 400 mg by mouth 2 times daily  20  Yes Efraín Kwan MD   torsemide (DEMADEX) 20 MG tablet Take 1 tablet by mouth 2 times daily 20  Yes Lou Sheldon MD   QUEtiapine (SEROQUEL) 50 MG tablet Take 1 tablet by mouth nightly 20  Yes Cristi Avalos PA-C   donepezil (ARICEPT) 10 MG tablet Take 1 tablet by mouth nightly 20  Yes Cristi Avalos PA-C   metoprolol (LOPRESSOR) 100 MG tablet TAKE 1 TABLET TWICE A DAY  Patient taking differently: Take 50 mg by mouth daily TAKE 1 TABLET TWICE A DAY 10/4/19  Yes Denzel Lopez MD   potassium chloride (KLOR-CON M) 20 MEQ extended release tablet Take 2 tablets by mouth 2 times daily for 5 days 20  Efraín Kwan MD   UNABLE TO FIND Shower rail 20   Cristi Avalos PA-C   UNABLE TO FIND Shower chair/stool 20   Cristi Avalos PA-C      Scheduled Medications:    donepezil  10 mg Oral Nightly    lactulose  30 g Oral 4 times per day    magnesium oxide  400 mg Oral BID    metoprolol  50 mg Oral BID    spironolactone  50 mg Oral BID    torsemide  20 mg Oral BID    sodium chloride flush  10 mL Intravenous 2 times per day    enoxaparin  40 mg Subcutaneous Daily    rifaximin  550 mg Oral BID     Infusions:   PRN Medications: sodium chloride flush, acetaminophen **OR** acetaminophen, promethazine **OR** ondansetron, polyethylene glycol  Allergies: No Known Allergies      Allergies:  Patient has no known allergies. Social History:   TOBACCO:   reports that she has never smoked. She has never used smokeless tobacco.  ETOH:   reports no history of alcohol use. Family History:       Problem Relation Age of Onset    Diabetes Mother     Other Father         black lung     No family history of colon cancer, Crohn's disease, or ulcerative colitis.     REVIEW OF SYSTEMS:      Neg apart from review of systems  PHYSICAL EXAM:      Vitals:    BP (!) 130/97   Pulse 92   Temp 97.4 °F (36.3 °C) (Axillary)   Resp 18   Ht 5' 2\" (1.575 m)   Wt 102 lb (46.3 kg)   SpO2 96%   BMI 18.66 kg/m²     General Appearance:   confused, apathetic   HEENT:    pale conjunctival   Neck:   Supple, no JVD, No lymph nodes   Lungs:     Clear to auscultation bilaterally,    Chest Wall:    No tenderness or deformity    Heart:     S1 and S2 normal,   Abdomen:     ascitis pos   Rectal:    Patient refused   Extremities:  , no cyanosis or edema       Skin:   Skin , no major lesions   Lymph nodes:   No abnormality   Neurologic:   Unable to examine           DATA:    ABGs: No results found for: PHART, PO2ART, HMV4AHX  CBC:   Recent Labs     07/24/20 2045   WBC 9.2   HGB 10.6*        BMP:    Recent Labs     07/24/20 2045      K 4.1      CO2 24   BUN 23   CREATININE 1.2*   GLUCOSE 117*     Magnesium:   Lab Results   Component Value Date    MG 2.0 07/24/2020     Hepatic:   Recent Labs     07/24/20 2045   AST 42*   ALT 15   BILITOT 0.7   ALKPHOS 84     No results for input(s): LIPASE, AMYLASE in the last 72 hours. Recent Labs     07/24/20 2045   PROTIME 15.0*   INR 1.31     No results for input(s): PTT in the last 72 hours. Lipids: No results for input(s): CHOL, HDL in the last 72 hours.     Invalid input(s): LDLCALCU  INR:   Recent Labs     07/24/20 2045   INR 1.31     TSH: No results found for: TSH  No intake or output data in the 24 hours ending 07/25/20 1307      Imaging Studies  reviewed        Eugenia Bower  7/25/2020  1:07 PM

## 2020-07-25 NOTE — PROGRESS NOTES
Update progress note    80-year-old female with past medical history of decompensated cirrhosis and dementia was admitted to the hospital for worsening confusion likely secondary to hepatic encephalopathy. Upon encounter this morning, the patient was not talking at all. She is appears frail and thin. Ultrasound showed large volume ascites.     Plan  Diagnostic and therapeutic thoracentesis on Monday  Lactulose and rifaximin  Diuretics with Lasix and Aldactone  PT/OT  With syncope screening for discharge to SNF

## 2020-07-25 NOTE — ED PROVIDER NOTES
Triage Chief Complaint:   Fall (Has fallen 2 nights in a row, can't walk. Talked to her PCP earlier and the  referred the  to bring her here to the ER.  states he wants her placed somewhere) and Altered Mental Status    [place]:  Chavez [place] is a 80 y.o. female that presents with  who reports \"I just cannot take care of her anymore\".  reports that she has severe and progressive dementia that is slowly been worsening. Symptoms had been markedly worsening over the last several days.  reports \"I do not even think she knows she is in this world anymore\". Patient has had difficulties with falls recently as she does seem to get up in the night and \"walk around to get food\" per her . Patient did strike her head 2 days ago and has had a bruise in the left face. Additionally, last night patient fell and sustained some abrasions to her right arm and bruises to her legs. Patient has been ambulatory since the most recent fall. No known anticoagulation per .  attempted to call patient's primary care physician who encouraged him to come to the emergency department for further assessment and likely placement. Patient is essentially nonverbal with me, which limits history markedly. ROS:  Limited as above given patient's underlying dementia. Past Medical History:   Diagnosis Date    Arthritis     Dementia ([address]     Hyperlipidemia     Hypertension     MDRO (multiple drug resistant organisms) resistance     MRSA?      Past Surgical History:   Procedure Laterality Date     SECTION      CHOLECYSTECTOMY      HYSTERECTOMY      PACEMAKER INSERTION Left 2013    PACEMAKER PLACEMENT      PARACENTESIS N/A 2020    IR    SKIN CANCER EXCISION       Family History   Problem Relation Age of Onset    Diabetes Mother     Other Father         black lung     Social History     Socioeconomic History    Marital status:  Spouse name: Not on file    Number of children: Not on file    Years of education: Not on file    Highest education level: Not on file   Occupational History    Not on file   Social Needs    Financial resource strain: Not on file    Food insecurity     Worry: Not on file     Inability: Not on file    Transportation needs     Medical: Not on file     Non-medical: Not on file   Tobacco Use    Smoking status: Never Smoker    Smokeless tobacco: Never Used   Substance and Sexual Activity    Alcohol use: No    Drug use: No    Sexual activity: Never   Lifestyle    Physical activity     Days per week: Not on file     Minutes per session: Not on file    Stress: Not on file   Relationships    Social connections     Talks on phone: Not on file     Gets together: Not on file     Attends [employer] service: Not on file     Active member of club or organization: Not on file     Attends meetings of clubs or organizations: Not on file     Relationship status: Not on file    Intimate partner violence     Fear of current or ex partner: Not on file     Emotionally abused: Not on file     Physically abused: Not on file     Forced sexual activity: Not on file   Other Topics Concern    Not on file   Social History Narrative    Not on file     Current Facility-Administered Medications   Medication Dose Route Frequency Provider Last Rate Last Dose    lactulose (CHRONULAC) 10 GM/15ML solution 20 g  20 g Oral Once Carmencita Chatman MD         Current Outpatient Medications   Medication Sig Dispense Refill    Multiple Vitamins-Minerals (MULTIVITAMIN ADULTS PO) Take 1 tablet by mouth daily      Omega-3 Fatty Acids (FISH OIL PO) Take 1,000 mg by mouth daily as needed      lactulose (CHRONULAC) 10 GM/15ML solution Take 30 g by mouth daily      potassium chloride (KLOR-CON M) 20 MEQ extended release tablet Take 2 tablets by mouth 2 times daily for 5 days 20 tablet 0    spironolactone (ALDACTONE) 50 MG tablet Take 1 tablet by mouth 2 times daily 60 tablet 3    magnesium oxide (MAG-OX) 400 (240 Mg) MG tablet Take 1 tablet by mouth daily (Patient taking differently: Take 400 mg by mouth 2 times daily ) 30 tablet 5    torsemide (DEMADEX) 20 MG tablet Take 1 tablet by mouth 2 times daily 60 tablet 3    UNABLE TO FIND Shower rail 1 Units 0    UNABLE TO FIND Shower chair/stool 1 Units 0    QUEtiapine (SEROQUEL) 50 MG tablet Take 1 tablet by mouth nightly 90 tablet 3    donepezil (ARICEPT) 10 MG tablet Take 1 tablet by mouth nightly 90 tablet 1    metoprolol (LOPRESSOR) 100 MG tablet TAKE 1 TABLET TWICE A DAY (Patient taking differently: Take 50 mg by mouth daily TAKE 1 TABLET TWICE A DAY) 180 tablet 3     No Known Allergies    Nursing Notes Reviewed    Physical Exam:  ED Triage Vitals [07/24/20 3987]   Enc Vitals Group      BP (!) 170/96      Pulse 109      Resp 14      Temp 97.8 °F (36.6 °C)      Temp Source Oral      SpO2 98 %      Weight 102 lb (46.3 kg)      Height 5' 2\" (1.575 m)      Head Circumference       Peak Flow       Pain Score       Pain Loc       Pain Edu? Excl. in [address]? My pulse ox interpretation is - normal    General appearance: Scattered contusions and abrasions. Covered in soiled close. Dried stool on feet. Moving about the bed. Skin:  Warm. Dry. Contusions to bilateral knees. Skin tears and abrasions to the right arm. Contusion to the left cheek and periorbital region. Eye:  Extraocular movements intact. Pupils are midrange and equal reactive to light. Ears, nose, mouth and throat:  Oral mucosa moist.  Left cheek contusion as above. No large cephalhematoma. Neck:  Trachea midline. No bony midline tenderness or step-off. Extremity:  No swelling. Normal ROM. Scattered contusions and abrasions. No gross deformity. Heart:  Regular rate and rhythm, normal S1 & S2, no extra heart sounds. Perfusion:  Intact. Sinus entry bilateral radial and PT pulses.   Respiratory:  Lungs clear to auscultation bilaterally. Respirations nonlabored. Abdominal:  Normal bowel sounds. Soft. Nontender. Non distended. Thin abdomen. Back:  No CVA tenderness to palpation     Neurological: Alert. Looking around the room. Moving x4 extremities. Nonverbal.  Not following commands. Purposeful movements are noted. Psychiatric:  Deferred.     I have reviewed and interpreted all of the currently available lab results from this visit (if applicable):  Results for orders placed or performed during the hospital encounter of 07/24/20   CBC auto diff   Result Value Ref Range    WBC 9.2 4.0 - 10.5 K/CU MM    RBC 3.52 (L) 4.2 - 5.4 M/CU MM    Hemoglobin 10.6 (L) 12.5 - 16.0 GM/DL    Hematocrit 33.7 (L) 37 - 47 %    MCV 95.7 78 - 100 FL    MCH 30.1 27 - 31 PG    MCHC 31.5 (L) 32.0 - 36.0 %    RDW 15.1 (H) 11.7 - 14.9 %    Platelets 057 430 - 127 K/CU MM    MPV 12.7 (H) 7.5 - 11.1 FL    Differential Type AUTOMATED DIFFERENTIAL     Segs Relative 70.2 (H) 36 - 66 %    Lymphocytes % 21.9 (L) 24 - 44 %    Monocytes % 6.4 (H) 0 - 4 %    Eosinophils % 0.4 0 - 3 %    Basophils % 0.8 0 - 1 %    Segs Absolute 6.4 K/CU MM    Lymphocytes Absolute 2.0 K/CU MM    Monocytes Absolute 0.6 K/CU MM    Eosinophils Absolute 0.0 K/CU MM    Basophils Absolute 0.1 K/CU MM    Immature Neutrophil % 0.3 0 - 0.43 %    Total Immature Neutrophil 0.03 K/CU MM   CMP   Result Value Ref Range    Sodium 140 135 - 145 MMOL/L    Potassium 4.1 3.5 - 5.1 MMOL/L    Chloride 105 99 - 110 mMol/L    CO2 24 21 - 32 MMOL/L    BUN 23 6 - 23 MG/DL    CREATININE 1.2 (H) 0.6 - 1.1 MG/DL    Glucose 117 (H) 70 - 99 MG/DL    Calcium 9.2 8.3 - 10.6 MG/DL    Alb 2.9 (L) 3.4 - 5.0 GM/DL    Total Protein 7.6 6.4 - 8.2 GM/DL    Total Bilirubin 0.7 0.0 - 1.0 MG/DL    ALT 15 10 - 40 U/L    AST 42 (H) 15 - 37 IU/L    Alkaline Phosphatase 84 40 - 129 IU/L    GFR Non- 43 (L) >60 mL/min/1.73m2    GFR  52 (L) >60 mL/min/1.73m2    Anion Gap 11 4 - 16   Urinalysis (Lab)   Result Value Ref Range    Color, UA YELLOW YELLOW    Clarity, UA SL HAZY CLEAR    Glucose, Urine NEGATIVE NEGATIVE MG/DL    Bilirubin Urine NEGATIVE NEGATIVE MG/DL    Ketones, Urine NEGATIVE NEGATIVE MG/DL    Specific Gravity, UA 1.010 1.001 - 1.035    Blood, Urine NEGATIVE NEGATIVE    pH, Urine 6.0 5.0 - 8.0    Protein, UA NEGATIVE NEGATIVE MG/DL    Urobilinogen, Urine 0.2 0.2 - 1.0 MG/DL    Nitrite Urine, Quantitative NEGATIVE NEGATIVE    Leukocyte Esterase, Urine NEGATIVE NEGATIVE    RBC, UA NEGATIVE 0 - 6 /HPF    WBC, UA NEGATIVE 0 - 5 /HPF    Epithelial Cells, UA 0 TO 1 /HPF    Cast Type NEGATIVE (A) NO CAST FORMS SEEN /HPF    Bacteria, UA MODERATE NEGATIVE /HPF    Crystal Type NEGATIVE NEGATIVE /HPF   Ammonia Level   Result Value Ref Range    Ammonia 86 (H) 11 - 51 UMOL/L   Magnesium   Result Value Ref Range    Magnesium 2.0 1.8 - 2.4 mg/dl   PT - INR   Result Value Ref Range    Protime 15.0 (H) 11.7 - 14.5 SECONDS    INR 1.31 INDEX   ETOH Blood   Result Value Ref Range    Alcohol Scrn <0.01 <0.01 %WT/VOL   Urine Drug Screen   Result Value Ref Range    Cannabinoid Scrn, Ur NEGATIVE NEGATIVE    Amphetamines NEGATIVE NEGATIVE    Cocaine Metabolite NEGATIVE NEGATIVE    Benzodiazepine Screen, Urine NEGATIVE NEGATIVE    Barbiturate Screen, Ur NEGATIVE NEGATIVE    Opiates, Urine NEGATIVE NEGATIVE    Phencyclidine, Urine NEGATIVE NEGATIVE    Oxycodone NEGATIVE NEGATIVE   EKG 12 Lead   Result Value Ref Range    Ventricular Rate 107 BPM    Atrial Rate 107 BPM    P-R Interval 144 ms    QRS Duration 90 ms    Q-T Interval 354 ms    QTc Calculation (Bazett) 472 ms    P Axis 89 degrees    R Axis -39 degrees    T Axis 157 degrees    Diagnosis       ** Suspect unspecified pacemaker failure  Sinus tachycardia with occasional ventricular-paced complexes and with occasional premature ventricular complexes  Left axis deviation  Pulmonary disease pattern  Left ventricular hypertrophy with repolarization needle sheath was advanced under ultrasound guidance into ascites and paracentesis was performed. Approximately 5.7 L of clear yellow fluid was removed. The patient tolerated the procedure well. The patient was given 50 g of albumin intravenously. FINDINGS: A total of 5.7 L of clear yellow fluid was removed. Successful ultrasound guided paracentesis. MDM:  Pt presents as above. Emergent conditions considered. Presentation prompted initial labs, EKG and imaging. Code stroke was not called given patient underlying history and unknown last known normal and progressive symptoms over days. CBC and CMP without clinically significant derangement other than chronic kidney disease. INR within normal limits. Urine drug screen is negative. EtOH negative. Magnesium within normal limits. CT head negative for acute intracranial process. CT cervical spine negative for acute traumatic injury. Chest x-ray negative for acute cardiopulmonary process. Urinalysis is with some bacteria and I will treat with Rocephin. Urine culture was sent. Patient's disposition was delayed significantly secondary to difficulties resulting ammonia. Ammonia level is markedly elevated. Lactulose is given for this. Given patient's medical history and the fact she is followed closely with GI and nephrology decision made to transfer the patient to [facility] for further evaluation and treatment of the above. Patient will likely need placement as well given her progressing dementia as  is not able to care for her anymore at home. Patient to be discussed with hospitalist at [facility] and patient to be transferred. Clinical Impression:  1. Altered mental status, unspecified altered mental status type    2. Hyperammonemia ([address]    3. Bacteriuria      Disposition referral (if applicable):  No follow-up provider specified.   Disposition medications (if applicable):  New Prescriptions    No medications on file       Comment: Please note this report has been produced using speech recognition software and may contain errors related to that system including errors in grammar, punctuation, and spelling, as well as words and phrases that may be inappropriate. If there are any questions or concerns please feel free to contact the dictating provider for clarification.        Jarrett Lim MD  07/25/20 8863

## 2020-07-25 NOTE — PROGRESS NOTES
Occupational Therapy    [facility] ACUTE CARE OCCUPATIONAL THERAPY EVALUATION  Ashwin Howard, 1935, 3013/3013-A, 2020    History  [place]:  There were no encounter diagnoses. Patient  has a past medical history of Arthritis, Dementia ([address] Hyperlipidemia, Hypertension, and MDRO (multiple drug resistant organisms) resistance. Patient  has a past surgical history that includes Hysterectomy; Cholecystectomy;  section; Skin cancer excision; Pacemaker insertion (Left, 2013); Paracentesis (N/A, 2020); and pacemaker placement. Subjective:  Patient states:  Pt stated \"yes\" or \"no\" to questions appropriately and at times stated \"It's chilly\"   Pain:  Denies. Communication with other providers:  Handoff to RN  Restrictions: General Precautions, Fall Risk    Home Setup/Prior level of function   Pt is a poor historian at this time. PLOF taken from this therapist's evaluation on 2020. 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 Examination of body systems (includes body structures/functions, activity/participation limitations):  · Observation:  Supine in bed upon arrival, agreeable to therapy, emaciated appearance  · [facility] [place]  · Hearing:  [facility] [place]  · Cardiopulmonary:  No 02 needs. Body Systems and functions:  · ROM R/L:  WFL during AAROM   · Strength R/L:  4-/5,   · Sensation: Unable to assess due to decreased cognition  · Tone: Normal  · Coordination: Greatly decreased gross/fine motor coordination.  Frequently missing food and missing mouth, difficulty grasping utensils  · Perception: Decreased initiation/sequencing, perseverating during ADLs    Activities of Daily Living (ADLs):  · Feeding: maxA (due to object agnosia, decreased cognition and greatly decreased gross/fine motor coordination. Perseverated during ADLs placing fingers in mouth continually with or without food)  · Grooming: modA (washing face w/ warm washcloth and brushing hair)  · UB bathing: modA (washing abdomen, trunk, BUE arms sitting upright in chair)  · LB bathing: maxA (washing marisa area/buttocks in stand, washing upper/lwoer legs in sit)  · UB dressing: modA (doffing/donning gown sitting upright in chair)  · LB dressing: maxA (doffing/donning socks sitting upright in chair)  · Toileting: maxA (washing marisa area/buttocks in stand)    Cognitive and Psychosocial Functioning:  · Overall cognitive status: Object agnosia unable to recognized what utensils were used for. Attempted to use knife as spoon or eat utensil instead of food  · Affect: Flat but pleasant and confused       Mobility:  · Supine to sit:  modA   · Transfers: CGA   · Sitting balance:  SBA due to decreased safety awareness. · Standing balance:  CGA. · Functional Mobility: CGA w/ HHA due to decreased cognition making use of RW difficult (See PT notes for gait details)  · Toilet/Shower Transfers: DNT             AM-[facility] Daily Activity Inpatient   How much help for putting on and taking off regular lower body clothing?: Total  How much help for Bathing?: A Lot  How much help for Toileting?: Total  How much help for putting on and taking off regular upper body clothing?: A Lot  How much help for taking care of personal grooming?: A Lot  How much help for eating meals?: Total  AM-PAC Inpatient Daily Activity Raw Score: 9  AM-PAC Inpatient ADL T-Scale Score : 25.33  ADL Inpatient CMS 0-100% Score: 79.59  ADL Inpatient CMS G-Code Modifier : CL    Treatment:  Self Care Training:   Cues were given for safety, sequence, UE/LE placement, visual cues, and balance.     Activities performed today included UB/LB bathing/dressing, grooming, toileting, feeding    Safety: patient left in chair with chair alarm, call light within reach, RN notified, gait belt used.    Assessment:  Pt is a 81 yo female admitted from home for acute encephalopathy. Pt at baseline needs assistance for ADLs needs assistance for high level IADLs and is Independent for functional transfers/mobility. Pt currently presents w/ deficits in ADL and high level IADL independence, functional activity tolerance, dynamic sitting and standing balance and tolerance and functional transfers, BUE strength/ROM, initiation/sequencing, gross/fine motor coordination and cognition. Pt would benefit from continued acute care OT services w/ discharge to SNF  Complexity: Moderate  Prognosis: Good, no significant barriers to participation at this time.    Plan  Times per week: 3x+  Times per day: Daily  Current Treatment Recommendations: Strengthening, Balance Training, Endurance Training, Neuromuscular Re-education, Pain Management, Patient/Caregiver Education & Training, Home Management Training, Cognitive/Perceptual Training, ROM, Functional Mobility Training, Cognitive Reorientation, Safety Education & Training, Equipment Evaluation, Education, & procurement, Positioning, Self-Care / ADL     Equipment: defer    Goals:  Pt goal: go home  Time Frame for STGs: discharge  Goal 1: Pt will perform UE ADLs SBA  Goal 2: Pt will perform LE ADLs SBA  Goal 3: Pt will perform toileting SBA  Goal 4: Pt will perform functional transfer w/ AD Supervision  Goal 5: Pt will perform functional mobility w/ AD Supervision  Goal 6: Pt will perform therex/theract in order to increase functional activity tolerance and dynamic standing balance    Treatment plan:  Pt will perform functional task in stand reaching in all 3 planes in order to increase dynamic standing balance and functional activity tolerance    Recommendations for NURSING activity: Up to chair for all 3 meals and up to standard commode for all toileting needs    Time:   Time in: 0841  Time out: 0847  Timed treatment minutes: 56 minutes  Total time: 66 minutes    Electronically signed by:    Adalid DURHAM/L [id number]  10:13 AM,7/25/2020

## 2020-07-25 NOTE — CARE COORDINATION
Cm received consult for placement at discharge. Cm contacted pt's  re; planning.  states that he took pt to [facility] yesterday and was unaware that pt was transferred to [facility].  states that the couple has been  20+ years.  states that pt has dementia. She has recently been getting up in the middle of the night and falling. Pt has been in kltchen in middle of the night openning packages and leaving the kitchen a mess ( states like a pig trough).  cannot manage pt care at home any longer.  becomes tearful while speaking with CM on phone.  states that he had contacted [facility] and [facility] yesterday and they came to his home with plans to admit pt to their facility. Problem is that  does not have the funds to sustain pt at [facility] for more than 2 months and pt will likely need LTC. Cm explained skilled vs non skilled care in a facility to . Cm notes that OT rec SNF and PT eval is pending. CM recommended to  that pt be placed for skilled care in a nursing facility that would also be able to keep pt in LTC once skilled time is finished. Cm explained anticipated need for medicaid application to cover long term care.  states that they prefer [facility] as 1st choice but aware that ultimately need to find a facility in [place] that can accept pt with plan for LTC after skilled care. Cm offered to contact another family member to review all of the medicare/medicaid insurance and  asked that CM contact pt's son, Paul Hurt at ph# [phone]. Lester Clements is pt's only living child. Cm attempted to contact Lester Clements with voice mail message left requesting return call. Demographics faxed to [facility] [employer] to initiate referral. Cm to follow.

## 2020-07-25 NOTE — PROGRESS NOTES
Pt admitted to 0, Dr. Chintan Salazar pt at bedside and placed orders. Pt responds to all questions \"yes\" or \"ok\" Pt bottom red, mepilex applied. Pt attempting to get out of bed, this RN went to assist pt to the bathroom. Pt is unable to walk without assistance, and unable to sit on the toilet independently. This RN had to hold pt up while on the toilet.  Electronically signed by Lobo Edwards RN on 7/25/2020 at 6:34 AM

## 2020-07-25 NOTE — H&P
History and Physical  Internal Medicine Hospitalist      Name:  Felicitas Holliday /Age/Sex: 1935  (80 y.o. female)   MRN & CSN:  [id number] & [id number] Admission Date/Time: 2020  5:41 AM   Location:  8444/6903 PCP: Vinita Holbrook, [facility] Day: 1          Chief Complaint: Worsening confusion     Assessment and Plan:   Felicitas Holliday is a 80 y.o.  female  who presents with encephalopathy     --- Hepatic encephalopathy  Confusion, elevated ammonia. Plan  Lactulose and rifaximin. GI consult    -- Liver cirrhosis c/p portal HTN, recurrent ascites requiring paracentesis, last tapped on 20 (5.7L). Plan  Continue Lasix and Aldactone  US abdomen for ascites survey    --Severe dementia   unable to continue to care for patient  Needs placement  Plan: CM consult, PT/OT. Other chronic medical conditions; ,medication resumed unless contraindicated  -Hyperlipidemia  -S/P pacemaker placement due to third-degree heart block (2013)  -Splenic artery aneurysm - f/u with Dr Vinita Hwang. - Primary Hypertension - losartan and lopressor.        Diet  low-salt diet   DVT Prophylaxis [x] Lovenox, []  Heparin, [] SCDs, [] Ambulation  [] Long term AC   GI Prophylaxis [x] PPI,  [] H2 Blocker,  [] Carafate,  [] Diet,  [] No GI prophylaxis, N/A: patient is not under significant medical stress, non-ICU or is receiving a diet/tube feeds   Code Status  full code   Disposition  likely needs nursing home placement   MDM [] Low, [x] Moderate,[]  High     History of Present Illness:     Principal Problem: Worsening confusion. Felicitas Holliday is a 80 y.o. female who was admitted as transfer from [place] ED. Patient has PMH of severe dementia, liver cirrhosis, heart block status post pacemaker. Patient is very confused so history was obtained from ED physician who stated that patient was brought to the [place] ED by her  who is requesting that she be placed in a facility.   is concerned that patient dementia is getting worse. Per the , patient has difficulty ambulating and has had multiple falls at home causing her to sustain abrasions to the extremities and her face. Patient is reportedly much more confused lately.  reports that he no longer can take care of patient. Work-up at [place] ED revealed elevated ammonia but otherwise rest of labs were unremarkable. CT head and C-spine were negative foracute abnormality. ED Course: Discussed case with ED physician prior to admission. ROS: As per HPI, otherwise 10-systems reviewed negative, unless as noted above. Objective:   No intake or output data in the 24 hours ending 20 9994     Vitals: There were no vitals filed for this visit. Physical Exam: 20    GEN  -elderly  woman lying in bed. EYES -bruise around left eye  HENT -MM are moist. Oral pharynx without exudates, no evidence of thrush. NECK -Supple, no apparent thyromegaly or masses. RESP -breathing comfortably on room air. C/V  -S1/S2 auscultated. RRR without appreciable M/R/G. No peripheral edema. GI  -Abdomen is soft & non-distended, no significant tenderness. No masses or guarding. + BS in all quadrants. MS  -B/L extremities strong muscles strength. Full movements. No gross joint deformities. No swelling, intact sensation symmetrical.   SKIN  -bruises at different stages of healing on both upper extremities  NEURO - Awake, alert, but very confused. Does not engage in conversation. Past Medical History:      Past Medical History:   Diagnosis Date    Arthritis     Dementia ([facility] [address]     Hyperlipidemia     Hypertension     MDRO (multiple drug resistant organisms) resistance     MRSA? Past Surgery History:  Patient  has a past surgical history that includes Hysterectomy; Cholecystectomy;  section; Skin cancer excision; Pacemaker insertion (Left, 2013);  Paracentesis (N/A, 2020); and pacemaker placement. Social History:    FAM HX: Assessed: family history includes Diabetes in her mother; Other in her father. Soc HX:   Social History     Socioeconomic History    Marital status:      Spouse name: Not on file    Number of children: Not on file    Years of education: Not on file    Highest education level: Not on file   Occupational History    Not on file   Social Needs    Financial resource strain: Not on file    Food insecurity     Worry: Not on file     Inability: Not on file    Transportation needs     Medical: Not on file     Non-medical: Not on file   Tobacco Use    Smoking status: Never Smoker    Smokeless tobacco: Never Used   Substance and Sexual Activity    Alcohol use: No    Drug use: No    Sexual activity: Never   Lifestyle    Physical activity     Days per week: Not on file     Minutes per session: Not on file    Stress: Not on file   Relationships    Social connections     Talks on phone: Not on file     Gets together: Not on file     Attends Yazidi service: Not on file     Active member of club or organization: Not on file     Attends meetings of clubs or organizations: Not on file     Relationship status: Not on file    Intimate partner violence     Fear of current or ex partner: Not on file     Emotionally abused: Not on file     Physically abused: Not on file     Forced sexual activity: Not on file   Other Topics Concern    Not on file   Social History Narrative    Not on file     TOBACCO:   reports that she has never smoked. She has never used smokeless tobacco.  ETOH:   reports no history of alcohol use. Drugs:  reports no history of drug use. Allergies: No Known Allergies  Medications:   Medications:    Infusions:   PRN Meds:   Prior to Admission Meds:  Prior to Admission medications    Medication Sig Start Date End Date Taking?  Authorizing Provider   Multiple Vitamins-Minerals (MULTIVITAMIN ADULTS PO) Take 1 tablet by mouth daily    Historical Provider, MD   Omega-3 Fatty Acids (FISH OIL PO) Take 1,000 mg by mouth daily as needed    Historical Provider, MD   lactulose (CHRONULAC) 10 GM/15ML solution Take 30 g by mouth daily    Historical Provider, MD   potassium chloride (KLOR-CON M) 20 MEQ extended release tablet Take 2 tablets by mouth 2 times daily for 5 days 7/9/20 7/14/20  Shane Cordova MD   spironolactone (ALDACTONE) 50 MG tablet Take 1 tablet by mouth 2 times daily 7/9/20   Shane Cordova MD   magnesium oxide (MAG-OX) 400 (240 Mg) MG tablet Take 1 tablet by mouth daily  Patient taking differently: Take 400 mg by mouth 2 times daily  6/23/20   Shane Cordova MD   torsemide (DEMADEX) 20 MG tablet Take 1 tablet by mouth 2 times daily 6/12/20   Meg Cuevas MD   UNABLE TO FIND Shower rail 5/29/20   Katherine Sauer PA-C   UNABLE TO FIND Shower chair/stool 5/29/20   Katherine Sauer PA-C   QUEtiapine (SEROQUEL) 50 MG tablet Take 1 tablet by mouth nightly 5/28/20   Katherine Sauer PA-C   donepezil (ARICEPT) 10 MG tablet Take 1 tablet by mouth nightly 5/8/20   Katherine Sauer PA-C   metoprolol (LOPRESSOR) 100 MG tablet TAKE 1 TABLET TWICE A DAY  Patient taking differently: Take 50 mg by mouth daily TAKE 1 TABLET TWICE A DAY 10/4/19   Juan Garay MD     Data:     Laboratory this visit:  Reviewed  Recent Labs     07/24/20 2045   WBC 9.2   HGB 10.6*   HCT 33.7*         Recent Labs     07/24/20 2045      K 4.1      CO2 24   BUN 23   CREATININE 1.2*     Recent Labs     07/24/20 2045   AST 42*   ALT 15   BILITOT 0.7   ALKPHOS 84     Recent Labs     07/24/20 2045   INR 1.31     No results for input(s): CKTOTAL, CKMB, CKMBINDEX in the last 72 hours. Invalid input(s): Dotty Lozoya input(s): PRO-BNP    Radiology this visit:  Reviewed.     Ct Head Wo Contrast    Result Date: 7/24/2020  EXAMINATION: CT OF THE HEAD WITHOUT CONTRAST  7/24/2020 7:27 pm TECHNIQUE: CT of the head was performed without the administration of intravenous contrast. Dose modulation, iterative reconstruction, and/or weight based adjustment of the mA/kV was utilized to reduce the radiation dose to as low as reasonably achievable. COMPARISON: 06/05/2020 HISTORY: ORDERING SYSTEM PROVIDED HISTORY: falls, AMS TECHNOLOGIST PROVIDED HISTORY: Reason for exam:->falls, AMS Has a \"code stroke\" or \"stroke alert\" been called? ->No Reason for Exam: fall Acuity: Acute Type of Exam: Initial Mechanism of Injury: fall Relevant Medical/Surgical History: fall FINDINGS: Examination is degraded by motion. BRAIN/VENTRICLES: There is no acute intracranial hemorrhage, mass effect or midline shift. No abnormal extra-axial fluid collection. The gray-white differentiation is maintained without evidence of an acute infarct. There is no evidence of hydrocephalus. Periventricular and deep subcortical white matter hypoattenuation, consistent with microangiopathic change. Mild parenchymal volume loss. Atherosclerosis of the intracranial vasculature. Left sphenoid wing extra-axial lesion with associated calcification is unchanged in size and appearance in the interval, likely a meningioma. ORBITS: The visualized portion of the orbits demonstrate no acute abnormality. SINUSES: The visualized paranasal sinuses and mastoid air cells demonstrate no acute abnormality. SOFT TISSUES/SKULL:  No acute abnormality of the visualized skull or soft tissues. No acute intracranial abnormality. Microangiopathic change. Left sphenoid wing lesion is not significantly changed, likely a meningioma. Ct Cervical Spine Wo Contrast    Result Date: 7/24/2020  EXAMINATION: CT OF THE CERVICAL SPINE WITHOUT CONTRAST 7/24/2020 7:27 pm TECHNIQUE: CT of the cervical spine was performed without the administration of intravenous contrast. Multiplanar reformatted images are provided for review.  Dose modulation, iterative reconstruction, and/or weight based adjustment of the mA/kV was utilized to reduce the radiation dose to as low as reasonably achievable. COMPARISON: CT cervical spine 06/05/2020 HISTORY: Multiple falls, neck pain FINDINGS: BONES/ALIGNMENT: No acute fracture evident. Stable appearing grade 1 retrolisthesis C4 on C5. DEGENERATIVE CHANGES: Diffuse mild-to-moderate degenerative changes. Mild canal stenosis appears unchanged at C4-5 measuring 9 mm AP dimension. Mild bilateral neural foraminal narrowing predominates mid cervical spine. SOFT TISSUES: There is no prevertebral soft tissue swelling. Stable meningioma adjacent to the left sphenoid bone. Calcifications involving bilateral carotid vasculature reflect calcific atherosclerosis. No acute abnormality of the cervical spine. Stable grade 1 retrolisthesis C4 on C5. Stable degenerative changes predominate at C4-5 with mild cervical spinal canal stenosis at this level. Calcifications involving bilateral carotid vasculature reflect calcific atherosclerosis. Xr Chest Portable    Result Date: 7/24/2020  EXAMINATION: ONE XRAY VIEW OF THE CHEST 7/24/2020 7:27 pm COMPARISON: 06/05/2020 HISTORY: ORDERING SYSTEM PROVIDED HISTORY: AMS TECHNOLOGIST PROVIDED HISTORY: Reason for exam:->AMS Reason for Exam: fall Acuity: Acute Type of Exam: Initial Mechanism of Injury: fall Relevant Medical/Surgical History: fall FINDINGS: Cardiac pacing device is unchanged in position. Cardiomediastinal silhouette is unchanged in size. Aortic atherosclerosis. Lungs are underinflated with linear atelectasis in the left lung base. No consolidation, pleural effusion, or pneumothorax. Osteopenia without acute osseous abnormality. No acute cardiopulmonary abnormality.      Ir Us Guided Paracentesis    Result Date: 7/14/2020  PROCEDURE: ULTRASOUND GUIDED PARACENTESIS 7/14/2020 HISTORY: ORDERING SYSTEM PROVIDED HISTORY: Other ascites TECHNOLOGIST PROVIDED HISTORY: Reason for exam:->Ascites TECHNIQUE: Informed consent was obtained after a detailed explanation of the procedure including risks, benefits, and alternatives. Universal protocol was followed. The right abdomen was prepped and draped in sterile fashion and local anesthesia was achieved with lidocaine. An 8 Spanish needle sheath was advanced under ultrasound guidance into ascites and paracentesis was performed. Approximately 5.7 L of clear yellow fluid was removed. The patient tolerated the procedure well. The patient was given 50 g of albumin intravenously. FINDINGS: A total of 5.7 L of clear yellow fluid was removed. Successful ultrasound guided paracentesis. EKG this visit:   EKG: V paced rhythm.     Current Treatment Team:  Treatment Team: Attending Provider: Blaze Silva MD; Patient Care Tech: Jude Sun Md, MS  [facility]  7/25/2020 6:08 AM      Electronically signed by Blaze Silva MD on 7/25/2020 at 6:08 AM

## 2020-07-25 NOTE — ED NOTES
[address] [facility] to call Hospitalist at [facility] to talk to Dr Triston Colvin about admitting pt.      Anne-Marie Easley, JENNIFER  07/25/20 7050

## 2020-07-25 NOTE — ED NOTES
Room assignment [id number] [phone]. Earliest transportation is noon Saturday.       Sandra Hogan RN  07/25/20 [id number]

## 2020-07-25 NOTE — ED NOTES
Arranged for [facility] to transport pt to [facility] in 30 mins.      Olivia Baldwin RN  07/25/20 8850

## 2020-07-25 NOTE — ED NOTES
Attends is wet. Linens changed and pericare done.  New attends on.     Tomas Smalls RN  07/25/20 9637

## 2020-07-25 NOTE — ED NOTES
[facility] here to transport pt to [facility]. Report given. Attends checked and is dry and no stool noted.      Ted Diez RN  07/25/20 6433

## 2020-07-25 NOTE — ED NOTES
Pt awake and restless. She kicked off blankets. Repositioned to her Rt side and covered with blanket. SR up x2. Depends is dry. Sitter machine is at bedside.       Kwadwo Johnson RN  07/25/20 2093

## 2020-07-26 NOTE — PROGRESS NOTES
PT KNOWN TO ME CHART REVIEWED AND PT EXAMINED CR NATALIE NOTE APPRECIATED  PT CONFUSED WITH SOME LOOSE BM NO GROSS GIT BLEEDING LVP IN ORDER FOR AM  VITALS STABLE   LABS NOTED   WILL CPM F/U LABS AND ASCITIC FLUID RESULTS

## 2020-07-26 NOTE — PROGRESS NOTES
Night Shift RN Notes:  Dementia-LIVAN was maintained for safety due to patient continued to be very confused and getting out of bed multiple times. Fed with snacks and drinks. Decompensated cirrhosis with Hepatic Encephalopathy and ascitis  Lactulose was administered as ordered and patient had been having multiple watery BMs the whole night. Did not sleep at all the whole night. Safety:   Very frequent rounding was done to assure patient safety. Side rails up x3, fall kit applied, yellow socks on, yellow blanket, fall jewels, bed on lowest position, bed alarm at zone 2 and was on the whole night, call light and phone within reach. Side table with reach. Safety floor mattress applied. Education on safety was provided last night.

## 2020-07-26 NOTE — PROGRESS NOTES
Pt extremely confused and was poking a plastic container that came on lunch tray and ended up cutting hand with fork. This nurse removed the fork and knife from room. Pt up in chair and food was placed in front of pt to eat. Pt remains with LIVAN. Dr. Lonne Councilman notified that pt cut hand with fork.

## 2020-07-26 NOTE — PROGRESS NOTES
This nurse spoke with pts  Edel Hamilton and notified him that patients ring was placed in safe in pts room.

## 2020-07-26 NOTE — PROGRESS NOTES
Hospitalist Progress Note      Name:  Theodore Bedolla /Age/Sex: 1935  (80 y.o. female)   MRN & CSN:  [id number] & [id number] Admission Date/Time: 2020  5:41 AM   Location:  0984/4399 PCP: [facility] Day: 2    ASSESSMENT & PLAN:   Theodore Bedolla is a 80 y.o.  female  who presented to the hospital with worsening confusion. She has a known history of decompensated cirrhosis with ascites. Ammonia elevated at 86. Disposition: Likely SNF. Remain in the hospital for encephalopathy and IR consultation for paracentesis    Hepatic encephalopathy---- with ammonia level of 86 in the setting of a decompensated cirrhosis with ascites. Continues to be confused. Also has underlying dementia which compounds the encephalopathy.  -Continue lactulose and rifaximin  -Titrate to 2-3 loose bowel movements per day    Severe dementia---- very confused. Does not answer questions. Given decompensated cirrhosis and advanced dementia, palliative care consult placed for CODE STATUS discussion.  -Continue donepezil    Decompensated cirrhosis with ascites--- abdominal ultrasound showing ascites in all 4 quadrants.  -Continue torsemide and Aldactone  -IR consultation for therapeutic and diagnostic paracentesis  -INR tomorrow    Hypertension--- on Lopressor 50 twice daily    Severe protein calorie malnutrition----weight 80 pounds, BMI 14.6  -Low-sodium diet  -Protein supplements      MEDICAL DECISION MAKING:  -Labs reviewed  -Imaging reviewed  -Level of risk moderate  Diet DIET LOW SODIUM 2 GM;   DVT Prophylaxis [] Lovenox, []  Heparin, [] SCDs, [] Ambulation   GI Prophylaxis [] PPI,  [] H2 Blocker,  [] Carafate,  [] Diet/Tube Feeds   Code Status Full Code   Disposition  SNF   MDM [] Low, [x] Moderate,[]  High     Chief complaint/Interval History/ROS     Chief Complaint: Encephalopathy      INTERVAL HISTORY: Patient remains confused. She laughs inappropriately. She is picking on things. ROS:  Does not respond to questions and thus not sure if she has pain. However does not appear to be someone who has pain. Objective: Intake/Output Summary (Last 24 hours) at 7/26/2020 1034  Last data filed at 7/26/2020 0249  Gross per 24 hour   Intake --   Output 800 ml   Net -800 ml      Vitals:   Vitals:    07/26/20 0255   BP: (!) 154/64   Pulse: 59   Resp: 18   Temp: 97.5 °F (36.4 °C)   SpO2: 100%     Physical Exam:   GEN: Awake female, thin and frail  female. Severely malnutrition. EYES: No eye discharge. Ecchymosis on the left are noted  HENT: Membranes dry. No nasal discharge. NECK: Supple,  RESP: No wheezing. No crackles. Symmetric chest expansion. CV: RRR. No pitting lower extremity edema. GI: Abdomen is soft, nondistended. : No Butler in place. MSK: No bone fractures. No gross deformities.   SKIN: warm, dry, no rashes, no pressure ulcer  PSYCH: Awake, confused, disoriented    Medications:   Medications:    donepezil  10 mg Oral Nightly    lactulose  30 g Oral 4 times per day    magnesium oxide  400 mg Oral BID    metoprolol  50 mg Oral BID    spironolactone  50 mg Oral BID    torsemide  20 mg Oral BID    sodium chloride flush  10 mL Intravenous 2 times per day    enoxaparin  40 mg Subcutaneous Daily    rifaximin  550 mg Oral BID      Infusions:   PRN Meds: sodium chloride flush, 10 mL, PRN  acetaminophen, 650 mg, Q6H PRN    Or  acetaminophen, 650 mg, Q6H PRN  promethazine, 12.5 mg, Q6H PRN    Or  ondansetron, 4 mg, Q6H PRN  polyethylene glycol, 17 g, Daily PRN          Electronically signed by Timi Mcghee MD on 7/26/2020 at 10:34 AM

## 2020-07-27 NOTE — CONSULTS
tablet 400 mg, 400 mg, Oral, BID  metoprolol tartrate (LOPRESSOR) tablet 50 mg, 50 mg, Oral, BID  sodium chloride flush 0.9 % injection 10 mL, 10 mL, Intravenous, 2 times per day  sodium chloride flush 0.9 % injection 10 mL, 10 mL, Intravenous, PRN  acetaminophen (TYLENOL) tablet 650 mg, 650 mg, Oral, Q6H PRN **OR** acetaminophen (TYLENOL) suppository 650 mg, 650 mg, Rectal, Q6H PRN  promethazine (PHENERGAN) tablet 12.5 mg, 12.5 mg, Oral, Q6H PRN **OR** ondansetron (ZOFRAN) injection 4 mg, 4 mg, Intravenous, Q6H PRN  enoxaparin (LOVENOX) injection 40 mg, 40 mg, Subcutaneous, Daily  polyethylene glycol (GLYCOLAX) packet 17 g, 17 g, Oral, Daily PRN  rifaximin (XIFAXAN) tablet 550 mg, 550 mg, Oral, BID    Allergies:  Patient has no known allergies. Social History:    Lives with     Family History:       Problem Relation Age of Onset    Diabetes Mother     Other Father         black lung       REVIEW OF SYSTEMS:    Review of systems not obtained due to patient factors - mental status    Vitals:    Vitals:    07/27/20 0900   BP: (!) 150/104   Pulse: 80   Resp: 18   Temp: 97.5 °F (36.4 °C)   SpO2: 99%       PHYSICAL EXAM:  No visitors present  GENERAL: sitting up in a chair eating lunch  HEENT: No cervical adenopathy, MM moist, PERRL  COR: RRR  LUNGS: CTA  ABD: softly distended, +BS, NT  SKIN: scattered ecchymosis  PSYCH: dementia, was not communicating with me.   NEURO: CN II-XII grossly intact    DATA:    CBC:   Lab Results   Component Value Date    WBC 10.6 07/27/2020    RBC 3.59 07/27/2020    HGB 11.0 07/27/2020    HCT 34.3 07/27/2020    MCV 95.5 07/27/2020    MCH 30.6 07/27/2020    MCHC 32.1 07/27/2020    RDW 14.6 07/27/2020     07/27/2020    MPV 12.4 07/27/2020     CMP:    Lab Results   Component Value Date     07/27/2020    K 3.6 07/27/2020     07/27/2020    CO2 29 07/27/2020    BUN 17 07/27/2020    CREATININE 0.9 07/27/2020    GFRAA >60 07/27/2020    AGRATIO 0.5 05/21/2020    LABGLOM 60 07/27/2020    GLUCOSE 97 07/27/2020    PROT 7.4 07/27/2020    LABALBU 2.7 07/27/2020    CALCIUM 9.3 07/27/2020    BILITOT 0.7 07/27/2020    ALKPHOS 93 07/27/2020    AST 41 07/27/2020    ALT 21 07/27/2020     Albumin:    Lab Results   Component Value Date    LABALBU 2.7 07/27/2020     CT Abd/Pelvis: 06/05/2020  1. No acute intra-process identified. 2. Redemonstration of cirrhosis and large volume of ascites. 3. Similar appearance of splenic artery aneurysm measuring up to 1.7 cm    compared with 1.7 cm on previous exam.          IMPRESSION:    80year old female with dementia and liver disease for Palliative Care encounter. I called and let a message for the  to call my office to talk about goals of care for his wife. It appears that her code status was changed to [facility] by Dr. Duane Basurto. We will continue to follow for any Palliative Care needs.           Electronically signed by Nimesh Sal MD on 7/27/2020 at 1:28 PM

## 2020-07-27 NOTE — PROGRESS NOTES
Pt arrived to IR for a paracentesis. Pt A&O, verbalizes understanding of procedure. Informed consent placed in soft chart. Pt prepped and draped to right abdomen. US images taken.     5842  Time Out  0753  Procedure started  0800  Procedure complete    1000 ml ascites fluid sent to the lab  1625 ml  total fluid removed    Sterile dressing applied by IR tech  Report given to [facility]

## 2020-07-27 NOTE — DISCHARGE INSTR - COC
Continuity of Care Form    Patient Name: Irving Nguyen   :  1935  MRN:  [id number]    Admit date:  2020  Discharge date:  2020    Code Status Order: DNR-CCA   Advance Directives:   [facility] Documentation     Date/Time Healthcare Directive Type of Healthcare Directive Copy in [facility] [address] Agent's Name Healthcare Agent's Phone Number    [phone]  No, patient does not have an advance directive for healthcare treatment -- -- -- -- --          Admitting Physician:  Charlene Gonzales MD  PCP: Jed Lei PA-C    Discharging Nurse: Wes Gage Unit/Room#: 3989/4915-H  Discharging Unit Phone Number: [phone]    Emergency Contact:   Extended Emergency Contact Information  Primary Emergency Contact: Abhi Marquez  Address: [address], [address] [place] [address] Phone: [phone]  Mobile Phone: [phone]  Relation: Spouse    Past Surgical History:  Past Surgical History:   Procedure Laterality Date     SECTION      CHOLECYSTECTOMY      HYSTERECTOMY      PACEMAKER INSERTION Left 2013    PACEMAKER PLACEMENT      PARACENTESIS N/A 2020    IR    SKIN CANCER EXCISION         Immunization History:   Immunization History   Administered Date(s) Administered    Hepatitis A Adult (Havrix, Vaqta) 2020    Hepatitis B Ped/Adol (Engerix-B, Recombivax HB) 2020    Influenza 10/15/2013    Influenza Virus Vaccine 2014, 2015    Influenza, Quadv, IM, (6 mo and older Fluzone, Flulaval, Fluarix and 3 yrs and older Afluria) 2017    Influenza, Quadv, IM, PF (6 mo and older Fluzone, Flulaval, Fluarix, and 3 yrs and older Afluria) 2016, 10/18/2018, 10/04/2019    Pneumococcal Conjugate 13-valent (Imlvith45) 2015    Pneumococcal Polysaccharide ([id number]) 2008    Tdap (Boostrix, Adacel) 2017       Active Problems:  Patient Active Problem List   Diagnosis Code    Cardiac pacemaker Z95.0    Hyperlipidemia with target LDL less than 100 E78.5    HTN (hypertension) I10    Pulmonary hypertension (HCC) I27.20    Recurrent major depressive disorder, in partial remission ([facility] [facility] F33.41    Late onset Alzheimer's disease with behavioral disturbance (HCC) G30.1, F02.81    Age-related osteoporosis without current pathological fracture M81.0    Abnormal CT of the head R93.0    Meningioma (HCC) D32.9    Hallucinations due to late onset dementia ([facility] F03.90, R44.3    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    Other ascites R18.8    Anasarca R60.1    Cirrhosis of liver (HCC) K74.60    Hypoalbuminemia E88.09    Renal failure N19    Female bladder prolapse N81.10    Acute encephalopathy G93.40       Isolation/Infection:   Isolation          No Isolation        Patient Infection Status     Infection Onset Added Last Indicated Last Indicated By Review Planned Expiration Resolved Resolved By    None active    Resolved    COVID-19 Rule Out 07/25/20 07/25/20 07/25/20 Covid-19 Ambulatory (Ordered)   07/27/20 Rule-Out Test Resulted    COVID-19 Rule Out 05/12/20 05/12/20 05/12/20 Covid-19 Ambulatory (Ordered)   05/14/20 Rule-Out Test Resulted          Nurse Assessment:  Last Vital Signs: /64   Pulse 66   Temp 97.8 °F (36.6 °C) (Oral)   Resp 16   Ht 5' 2\" (1.575 m)   Wt 76 lb 6.4 oz (34.7 kg)   SpO2 97%   BMI 13.97 kg/m²     Last documented pain score (0-10 scale): Pain Level: 0  Last Weight:   Wt Readings from Last 1 Encounters:   07/30/20 76 lb 6.4 oz (34.7 kg)     Mental Status:  disoriented    IV Access:  - None    Nursing Mobility/ADLs:  Walking   Assisted  Transfer  Assisted  Bathing  Assisted  Dressing  Assisted  Toileting  Dependent  Feeding  Assisted  Med Admin  Dependent  Med Delivery   crushed mixed with pudding     Wound Care Documentation and Therapy:        Elimination:  Continence:   · Bowel:

## 2020-07-27 NOTE — PROGRESS NOTES
Physical Therapy  [facility] ACUTE CARE PHYSICAL THERAPY EVALUATION  Felicitas Holliday, 1935, 3013/3013-A, 2020    History  [place]:  The encounter diagnosis was Other ascites. Patient  has a past medical history of Arthritis, Dementia ([address] Hyperlipidemia, Hypertension, and MDRO (multiple drug resistant organisms) resistance. Patient  has a past surgical history that includes Hysterectomy; Cholecystectomy;  section; Skin cancer excision; Pacemaker insertion (Left, 2013); Paracentesis (N/A, 2020); and pacemaker placement. Subjective:  Patient states:  \"I'm okay\"   Pain:  Denies   Communication with other providers:  JENNIFER Parsons   Restrictions: general precautions, falls, sitter     Home Setup/Prior level of function  Pt is a poor historian 2* dementia. Unable to gather accurate social functional history from her. Per charting she lives with her spouse and is ambulatory but has had multiple recent falls. Examination of body systems (includes body structures/functions, activity/participation limitations):  · Observation:  Supine in bed upon arrival. Pleasantly confused. Cooperative with therapy. Has delayed responses needing inc time to process and answer questions. Dec carry over of cues and overall command following. · Vision:  [employer]   · Hearing:  [employer]   · Cardiopulmonary: stable vitals on room air   · Oriented to person only. Thought she was at home and lived with her parents. Musculoskeletal  · ROM R/L:  Lexy/[employer] BLEs  · Strength R/L:  Slight strength deficits observed in function and endurance. Unable to follow commands well for formal MMT     Mobility/treatment:   · Rolling L/R: NT   · Supine to sit:  Leatha for sequencing and task initiation. Facilitated trunk rotation and leg advancement as well as UE reach for rail. · Transfers:   · Sit to stand: CGA from EOB and toilet. Cues for hand placement and use of grab bar in bathroom.    · Stand to sit: Leatha for hip guidance to align with seat and control sit to toilet and recliner. Cues forh and placement and use of grab bar in bathroom. · Step pivot: CGA for safety with min-modA for device management (2x)   · Sitting balance:  SBA at EOB and toilet with static and light dynamic activity, no UE support. · Standing balance:  CGA at RW and while at sink performing hand hygiene tasks x ~2 minutes   · Gait: ~15ft + 10ft with RW CGA for safety and Leatha for assisting with directional changes with RW. Good upright posture with dec pace and bilat step length. Declined further ambulation but was not able to describe why she did not want to go further. · Educated pt on POC, role of PT, safe use of DME. Constant cues for sequencing and hand placement to inc safety and indep with mobility. [facility] 6 Clicks Inpatient Mobility:  AM-PAC Inpatient Mobility Raw Score : 18    Safety: patient left in chair with sitter present, call light within reach, RN notified, gait belt used. Assessment:  Decreased functional mobility ; Decreased safe awareness; Decreased endurance; Decreased balance; Decreased cognition; Decreased strength  Pt is an [place] 3year old female admitted from from with acute encephalopathy and recent falls. Pt has hx of severe dementia. Pt had recent paracentesis 7/27 due to ascites. Recommend subacute rehab with transition to LTC once medically stable. Pts spouse has voiced his concerns that pt is a lot for him to assist at home. She is currently requiring up to [facility] and is limited with activity. She would benefit from continued therapy to address her current deficits, dec potential fall risk, and restore function. Complexity: Moderate  Prognosis: Good, no significant barriers to participation at this time. Plan Times per week: 3+/week  Discharge Recommendations: [address] with transition to LTC if spouse still feels that pt is more than he can manage at home.    Equipment: continue to assess at next level of care     Goals:  Short term goals  Time Frame for Short term goals: 1 week  Short term goal 1: Pt will perform sit><supine SBA  Short term goal 2: Pt will transfer to all surfaces SBA  Short term goal 3: Pt will ambulate 50ft with LRAD SBA  Short term goal 4: Pt will perform standing light dynamic activity with single UE support SBA x 3 minutes       Treatment plan:  Bed mobility, transfers, balance, gait, TA, TX, EX    Recommendations for NURSING mobility: ambulate to bathroom with RW     Time:   Time in: 1127  Time out: 1153  Timed treatment minutes: 16  Total time: 26    Electronically signed by:    Herman Bai [id number]  7/27/2020, 12:25 PM

## 2020-07-27 NOTE — PROGRESS NOTES
Hospitalist Progress Note      Name:  Alysia Vera /Age/Sex: 1935  (80 y.o. female)   MRN & CSN:  [id number] & [id number] Admission Date/Time: 2020  5:41 AM   Location:  [facility] PCP: Ej Reyes [address] [place] [address] Day: 3    ASSESSMENT & PLAN:   Alysia Vera is a 80 y.o.  female  who presented to the hospital with worsening confusion. She has a known history of decompensated cirrhosis with ascites. Ammonia elevated at 86. Disposition: Likely SNF  I talked to the spouse over the phone. He stated the patient has been going downhill for the past 2 years. She does not take her medications. She has not been eating as well. He wants the patient to be sent to a skilled facility. Additionally, I discussed with him about the CODE STATUS of the patient. The /spouse stated that he does not want his wife to be resuscitated. He does not want chest compressions, defibrillation or mechanical ventilation. As a result, the patient was changed to DNR CCA. Hepatic encephalopathy---- with ammonia level of 86 in the setting of a decompensated cirrhosis with ascites. Continues to be confused. Also has underlying dementia which compounds the encephalopathy.  -Continue lactulose and rifaximin  -Titrate to 2-3 loose bowel movements per day    Severe dementia---- very confused. Does not answer questions. Given decompensated cirrhosis and advanced dementia, palliative care consult placed for CODE STATUS discussion.  -Continue donepezil    Decompensated cirrhosis with ascites--- abdominal ultrasound showing ascites in all 4 quadrants. S/p paracentesis on  with removal of 1.6 L. She does not have a lot of swelling.   Diuretics adjusted today.  -Aldactone changed from 50 twice daily to 25 daily  -Torsemide changed from 20 twice daily to 20 daily    Hypertension--- on Lopressor 50 twice daily    Severe protein calorie malnutrition----weight 80 pounds, BMI 14.6  -Low-sodium diet  -Protein supplements      MEDICAL DECISION MAKING:  -Labs reviewed  -Imaging reviewed  -Level of risk moderate  Diet DIET LOW SODIUM 2 GM;   DVT Prophylaxis [] Lovenox, []  Heparin, [] SCDs, [] Ambulation   GI Prophylaxis [] PPI,  [] H2 Blocker,  [] Carafate,  [] Diet/Tube Feeds   Code Status DNR-CCA   Disposition  SNF   MDM [] Low, [x] Moderate,[]  High     Chief complaint/Interval History/ROS     Chief Complaint: Encephalopathy      INTERVAL HISTORY: Paracentesis done today. 1.6 L removed. Abdomen appears much softer and less distended. ROS:  Does not respond to questions and thus not sure if she has pain. However does not appear to be someone who has pain. Objective: Intake/Output Summary (Last 24 hours) at 7/27/2020 1336  Last data filed at 7/27/2020 8047  Gross per 24 hour   Intake 470 ml   Output 1625 ml   Net -1155 ml      Vitals:   Vitals:    07/27/20 0900   BP: (!) 150/104   Pulse: 80   Resp: 18   Temp: 97.5 °F (36.4 °C)   SpO2: 99%     Physical Exam:   GEN: Awake female, thin, frail appearing and malnourished female. EYES: No eye discharge. Ecchymosis on the left are noted  HENT: Membranes dry. No nasal discharge. NECK: Supple,  RESP: Clear to auscultation bilaterally  CV: RRR. No pitting lower extremity edema. GI: Abdomen is soft, nondistended. : No Butler in place. MSK: No bone fractures. No gross deformities.   SKIN: warm, dry, no rashes, no pressure ulcer  PSYCH: Awake, confused, disoriented    Medications:   Medications:    [START ON 7/28/2020] spironolactone  25 mg Oral Daily    torsemide  20 mg Oral Daily    donepezil  10 mg Oral Nightly    lactulose  30 g Oral 4 times per day    magnesium oxide  400 mg Oral BID    metoprolol  50 mg Oral BID    sodium chloride flush  10 mL Intravenous 2 times per day    enoxaparin  40 mg Subcutaneous Daily    rifaximin  550 mg Oral BID      Infusions:   PRN Meds: LORazepam, 0.5 mg, Q4H PRN  sodium chloride flush, 10 mL, PRN  acetaminophen, 650 mg, Q6H PRN    Or  acetaminophen, 650 mg, Q6H PRN  promethazine, 12.5 mg, Q6H PRN    Or  ondansetron, 4 mg, Q6H PRN  polyethylene glycol, 17 g, Daily PRN          Electronically signed by Berlin Jarrett MD on 7/27/2020 at 1:36 PM

## 2020-07-27 NOTE — PROGRESS NOTES
DOING [facility] PT CONFUSED HAS LOOSE BM WITH NO GROSS BLEEDING HAD LVP  VITAS STABLE  LABS NOTED  WILL CPM F/U ASCITIC FLUID RESULTS

## 2020-07-27 NOTE — CARE COORDINATION
ABRAM contacted Camillet of [facility], re; referral and she advised that she has not reviewed  it yet but will do so today and will let CM know if they would be able to accept pt. They are concerned that they have no long term care beds at this time and inquire as to whether pt is a wandering risk. Cm explained that  offered no concern re; pt being a wanderer. Cm contacted pt's  with update. Cm discussed with  that if pt is unable to go to [facility] that referrals would then be made to [facility] and [facility].  provided with name and pH# of CM assigned to his wife's room; Toan Couch RN at .

## 2020-07-28 NOTE — PROGRESS NOTES
for safety, sequence, UE/LE placement, visual cues, and balance. Activities performed today included dressing, toileting, hand hygiene, and grooming. Bathing: Mod A for marisa / buttocks and for B axilla as pt required increased time and did not perform thoroughly for these areas, was able to wash BLE, including feet c both verbal / physical cues for sequencing. UB Dressing: Mod A + cues  LB Dressing: Mod A + cues, required assist for threading BLE to don and hike pull up and incidental touching during donning of B  socks s/p pt able to doff brief and doff B  socks. Grooming: Min A for top area of head s/p pt combed hair in stance at sink c steadying assist, SBA seated to wash face. All therapeutic intervention performed c emphasis on dynamic balance / standing tolerance to inc strength, endurance and act tolerance for inc Indep c ADL tasks, func transfers / mobility. Safety  Patient safely in bedside chair + sitter at end of session, with call light/phone in reach, and nursing aware. Gait belt was used for func transfers / mobility. Assessment / Impression:        Patient's tolerance of treatment:  Well   Adverse Reaction: None  Significant change in status and impact:  None  Barriers to improvement:  Decreased cognition, decreased balance    Plan for Next Session:    Continue per OT POC c plan to continue addressing ADLs.     Time in:  0900  Time out:  0946  Timed treatment minutes:  46  Total treatment time:  55    Electronically signed by:    Tosha Kc,   7/28/2020, 8:57 AM    Previously filed values:       Goals:  Pt goal: go home  Time Frame for STGs: discharge  Goal 1: Pt will perform UE ADLs SBA  Goal 2: Pt will perform LE ADLs SBA  Goal 3: Pt will perform toileting SBA  Goal 4: Pt will perform functional transfer w/ AD Supervision  Goal 5: Pt will perform functional mobility w/ AD Supervision  Goal 6: Pt will perform therex/theract in order to increase functional activity tolerance and dynamic standing balance

## 2020-07-28 NOTE — PROGRESS NOTES
Sitter removed from pt's room, as pt seems to be a little more redirectable at this time. LIVAN placed in pt's room. Low bed also placed in pt's room. Will cont to monitor.

## 2020-07-28 NOTE — CARE COORDINATION
Reviewed chart. Spoke with Jillian Lyles at [address] [place] [address] who states they are not able to accept patient for rehab as patient will likely require LTC and they do not have any LTC beds available and do not anticipating having any available in the near future. They also stated they only have 1 skilled bed available. Spoke with Ida/admissions at [facility] who states they do not have any skilled beds available and do not anticipate having any available this week. Spoke with Martita/admissions at [facility] and [address] [place] [address] who states they do not have any skilled nor LTC beds available at this time. They do not anticipate having any skilled beds available this week. 11:03 AM  Called patient /Jose Armando at this time and explained above. He continues to request Anisa Zayas of [facility]. He would like patient to stay in [facility] if possible. He explained [facility] has openings and is able to accommodate patient at discharge as long as AL continues to be appropriate. Called Jillian Lyles with [address] [place] [address] who states they are not able to accept patient at this time for rehab given the uncertainly of the long term care plan. Updated patient  and after a long conversation he shared he does not have the funds to pay privately for LTC vs AL. He agrees to [facility] calling referral to [facility] for skilled to LTC with medicaid pending.

## 2020-07-28 NOTE — PROGRESS NOTES
Hospitalist Progress Note      Name:  Nata Davis /Age/Sex: 1935  (80 y.o. female)   MRN & CSN:  [id number] & [id number] Admission Date/Time: 2020  5:41 AM   Location:  9629/9532-X PCP: Marcus Negro, [facility] Day: 4    ASSESSMENT & PLAN:   Nata Davis is a 80 y.o.  female  who presented to the hospital with worsening confusion. She has a known history of decompensated cirrhosis with ascites. Ammonia elevated at 86. Disposition: SNF, awaiting placement  CODE STATUS changed to DNR CCA yesterday after talking to  spouse/    Hepatic encephalopathy---- with ammonia level of 86 in the setting of a decompensated cirrhosis with ascites. Continues to be confused. Also has underlying dementia which compounds the encephalopathy.  -Continue lactulose and rifaximin  -Titrate to 2-3 loose bowel movements per day    Severe dementia---- very confused. Does not answer questions. Given decompensated cirrhosis and advanced dementia, palliative care consult placed for CODE STATUS discussion.  -Continue donepezil    Decompensated cirrhosis with ascites--- abdominal ultrasound showing ascites in all 4 quadrants. S/p paracentesis on  with removal of 1.6 L. Does not have swelling. After talking to the , she has not been taking any of the medications. Her medications include torsemide 20 twice daily and Aldactone 50 twice daily. This lady  is malnourished and does not have swelling of extremities. As a result, diuretics adjusted.   -Aldactone 25 daily  -Torsemide 20 daily    Hypertension--- home med list include Lopressor 100 twice daily. Had not been taking medications per spouse account.   Lopressor adjusted.  -Lopressor 25 twice daily    Severe protein calorie malnutrition----weight 80 pounds, BMI 14.6  -Low-sodium diet  -Protein supplements      MEDICAL DECISION MAKING:  -Labs reviewed  -Imaging reviewed  -Level of risk moderate  Diet DIET LOW SODIUM 2 GM;   DVT Prophylaxis [] Lovenox, []  Heparin, [] SCDs, [] Ambulation   GI Prophylaxis [] PPI,  [] H2 Blocker,  [] Carafate,  [] Diet/Tube Feeds   Code Status DNR-CCA   Disposition  SNF   MDM [] Low, [x] Moderate,[]  High     Chief complaint/Interval History/ROS     Chief Complaint: Encephalopathy      INTERVAL HISTORY: S/p paracentesis with removal of 1.6 L on 7/27. Appears to be little better with regards to her mentation. However, this may be her baseline. ROS:  Answers questions more than she did on admission. Does not report chest pain or abdominal pain. Objective:     No intake or output data in the 24 hours ending 07/28/20 1208   Vitals:   Vitals:    07/28/20 0848   BP: 105/62   Pulse: 59   Resp: 18   Temp: 97.8 °F (36.6 °C)   SpO2:      Physical Exam:   GEN: Awake female, thin, frail appearing and malnourished female. EYES: Sclera anicteric. HENT: Membranes dry. No nasal discharge. Normocephalic atraumatic. NECK: Supple,  RESP: No wheezing. No crackles. Symmetric breath sounds. CV: RRR. No pitting lower extremity edema. GI: Abdomen is soft, nondistended. : No Butler in place. MSK: No bone fractures. No gross deformities.   SKIN: warm, dry, no rashes, no pressure ulcer  PSYCH: Awake,     Medications:   Medications:    metoprolol  25 mg Oral BID    spironolactone  25 mg Oral Daily    torsemide  20 mg Oral Daily    donepezil  10 mg Oral Nightly    lactulose  30 g Oral 4 times per day    magnesium oxide  400 mg Oral BID    sodium chloride flush  10 mL Intravenous 2 times per day    enoxaparin  40 mg Subcutaneous Daily    rifaximin  550 mg Oral BID      Infusions:   PRN Meds: LORazepam, 0.5 mg, Q4H PRN  sodium chloride flush, 10 mL, PRN  acetaminophen, 650 mg, Q6H PRN    Or  acetaminophen, 650 mg, Q6H PRN  promethazine, 12.5 mg, Q6H PRN    Or  ondansetron, 4 mg, Q6H PRN  polyethylene glycol, 17 g, Daily PRN          Electronically signed by Chencho Ricardo MD on 7/28/2020 at 12:08 PM

## 2020-07-29 NOTE — PROGRESS NOTES
Hospitalist Progress Note      Name:  Corrinne Rudder /Age/Sex: 1935  (80 y.o. female)   MRN & CSN:  [id number] & [id number] Admission Date/Time: 2020  5:41 AM   Location:  1279/9079-S PCP: Hilda Sanches, [facility] Day: 5    ASSESSMENT & PLAN:   Corrinne Rudder is a 80 y.o.  female  who presented to the hospital with worsening confusion. She has a known history of decompensated cirrhosis with ascites. --- Hepatic encephalopathy---- with ammonia level of 86 in the setting of a decompensated cirrhosis with ascites. Continues to be confused. Also has underlying dementia which compounds the encephalopathy. Continue lactulose and rifaximin  Titrate to 2-3 loose bowel movements per day    --- Severe dementia---- very confused. Does not answer questions. Continue donepezil    --- Decompensated cirrhosis with ascites---   S/p paracentesis on  with removal of 1.6 L. Plan  Aldactone 25 daily  Torsemide 20 daily    Hypertension- Lopressor 25 twice daily    Severe protein calorie malnutrition----weight 80 pounds, BMI 14.6  Low-sodium diet  Protein supplements      MEDICAL DECISION MAKING:  -Labs reviewed  -Imaging reviewed  -Level of risk moderate  Diet DIET LOW SODIUM 2 GM;   DVT Prophylaxis [] Lovenox, []  Heparin, [] SCDs, [] Ambulation   GI Prophylaxis [] PPI,  [] H2 Blocker,  [] Carafate,  [] Diet/Tube Feeds   Code Status DNR-CCA   Disposition  SNF, awaiting placement. MDM [] Low, [x] Moderate,[]  High     Chief complaint/Interval History/ROS     Chief Complaint: Encephalopathy      INTERVAL HISTORY: Resting calmly in bed. ROS:   Answers questions more than she did on admission. Does not report chest pain or abdominal pain. Objective:        Intake/Output Summary (Last 24 hours) at 2020 1049  Last data filed at 2020 1752  Gross per 24 hour   Intake 120 ml   Output --   Net 120 ml      Vitals:   Vitals:    20 0809   BP:    Pulse:    Resp:    Temp: SpO2: 100%     Physical Exam:   GEN: Awake female, thin, frail appearing and malnourished female. EYES: Sclera anicteric. HENT: Membranes dry. No nasal discharge. Normocephalic atraumatic. NECK: Supple,  RESP: No wheezing. No crackles. Symmetric breath sounds. CV: RRR. No pitting lower extremity edema. GI: Abdomen is soft, nondistended. : No Butler in place. MSK: No bone fractures. No gross deformities.   SKIN: warm, dry, no rashes, no pressure ulcer  PSYCH: Awake,     Medications:   Medications:    metoprolol  25 mg Oral BID    spironolactone  25 mg Oral Daily    torsemide  20 mg Oral Daily    donepezil  10 mg Oral Nightly    lactulose  30 g Oral 4 times per day    magnesium oxide  400 mg Oral BID    sodium chloride flush  10 mL Intravenous 2 times per day    enoxaparin  40 mg Subcutaneous Daily    rifaximin  550 mg Oral BID      Infusions:   PRN Meds: LORazepam, 0.5 mg, Q4H PRN  sodium chloride flush, 10 mL, PRN  acetaminophen, 650 mg, Q6H PRN    Or  acetaminophen, 650 mg, Q6H PRN  promethazine, 12.5 mg, Q6H PRN    Or  ondansetron, 4 mg, Q6H PRN  polyethylene glycol, 17 g, Daily PRN          Electronically signed by Tess Ansari MD on 7/29/2020 at 10:49 AM

## 2020-07-29 NOTE — PROGRESS NOTES
Occupational Therapy      Occupational Therapy Treatment Note  Name: Lino Ramos MRN: [id number] :   1935   Date:  2020   Admission Date: 2020 Room:  [address] [place] [address]   Restrictions/Precautions:    General Precautions, Fall Risk, LIVAN  Communication with other providers:  Co-tx w/ PT  Subjective:  Patient states:  When asked if pt wanted to go to the bathroom pt answered yes  Pain:   Location, Type, Intensity (0/10 to 10/10):  NO  Objective:    Observation:  Pt received supine in bed eating ice cream, agreeable to therapy  Objective Measures:  WFL  Treatment, including education:  Self Care Training:   Cues were given for safety, sequence, UE/LE placement, visual cues, and balance. Activities performed today included LB bathing/dressing, grooming, toileting    Supine to sit SBA w/ increased time to complete, Leatha to fix LLE sock. Sit to stand from EOB up to RW CGA, functional mobility to/from bathroom Leatha w/ RW w/ increased time and VCs to track visually. Stand to sit w/ grab bars Leatha. Delayne Conner w/ multiple VCs to doff depends and wash marisa area/buttocks pt requiring hand over hand assist to problem solve and sequence. In stand at sink to wash hands Leatha w/ tactile cues to problem solve and sequence. Pt performed functional mobility to/from room Leatha. Stand to sit Leatha.  Pt sitting upright in chair, chair alarm on, call light at side, nursing notified,  in room, LIVAN on    Assessment / Impression:        Patient's tolerance of treatment:  Pt tolerated treatment well   Adverse Reaction: none  Significant change in status and impact:  none  Barriers to improvement:  None    Plan for Next Session:    Pt will perform grooming activity at sink w/ increased sequencing    Time in:  1335  Time out:  1353  Timed treatment minutes:  18 minutes  Total treatment time:  18 minutes  Electronically signed by:    Enzo Lee OTR/L [id number]  2:24 PM,2020     Previously filed values: Goals:  Pt goal: go home  Time Frame for STGs: discharge  Goal 1: Pt will perform UE ADLs SBA  Goal 2: Pt will perform LE ADLs SBA  Goal 3: Pt will perform toileting SBA  Goal 4: Pt will perform functional transfer w/ AD Supervision  Goal 5: Pt will perform functional mobility w/ AD Supervision  Goal 6: Pt will perform therex/theract in order to increase functional activity tolerance and dynamic standing balance

## 2020-07-29 NOTE — PROGRESS NOTES
Physical Therapy    Physical Therapy Treatment Note  Name: Akiko Ingram MRN: [id number] :   1935   Date:  2020   Admission Date: 2020 Room:  [address] [place] [address]   Restrictions/Precautions:        , chair alarm   Communication with other providers:  OT   Subjective:  Patient states: \"Hi\"   Pain:   Location, Type, Intensity (0/10 to 10/10): Denies   Objective:    Observation:    Supine in bed eating ice cream. Disoriented to place, time, and situation. Agreeable and cooperative with therapy. Dec command following and STM. Spouse present at end of session. Treatment, including education/measures:  Supine to sit: SBA for safety with moderate VCs for task initiation and technique. Sit to stand: CGA from EOB and toilet to RW with multiple cues for hand placement to push from EOB and use of grab bar   Stand to sit: Leatha to recliner and toilet for hip alignment. Cues for hand sequencing to chair and grab bar for support   Step pivot: CGA for safety and Leatha for device management. Cues for sequencing full turn with AD   Ambulation: 10ft + 100ft with RW CGA for safety. Quick, shuffled pace. No major deviations or LOB noted. Educated pt on safe use of DME and POC. Needed re-oriented often.    Assessment / Impression:    Patient's tolerance of treatment:  Good    Adverse Reaction: no  Significant change in status and impact:  no  Barriers to improvement:  Activity tolerance, strength, balance, safety awareness, memory   Plan for Next Session:    Activity tolerance, strength, balance, gait, transfers, bed mobility   Time in:  1336  Time out:  1352  Timed treatment minutes:  16  Total treatment time:  16    Previously filed items:     Short term goals  Time Frame for Short term goals: 1 week  Short term goal 1: Pt will perform sit><supine SBA  Short term goal 2: Pt will transfer to all surfaces SBA  Short term goal 3: Pt will ambulate 50ft with LRAD SBA  Short term goal 4: Pt will perform standing light dynamic activity with single UE support SBA x 3 minutes       Electronically signed by:    Ryan Sharma [id number]  7/29/2020, 3:21 PM

## 2020-07-29 NOTE — CARE COORDINATION
Spoke with Sandra/admissions at [facility] who states patient has been accepted and pre-cert initiated. Pre-cert has gone to medical review and Sapphire Hu requested updated therapy notes to assist with pre-cert going through. Called and updated patient /Jose Armando [phone] who is agreeable to working with [facility] for [employer] process. Packet started and PAS/RR completed at this time. Packet needs AVS and any RX at discharge. 2:20 PM  Spoke with /Jose Armando in room and updated him awaiting insurance decision. Called Sandra/admisisons at [facility] with no answer at this time. [facility] Please call report to [phone] and fax orders, AVS, and discharge summaries to [phone].

## 2020-07-30 NOTE — CARE COORDINATION
Called and left message for Sandra/admissions at [facility] for update on pre-cert. 2:24 PM  Received update from Sandra/admissions at [facility] who states pre-cert received and able to admit today. PS to Dr. Kandis Solorio to update. Called [employer] with transportation set up for 4:45 pm, face sheet faxed. Updated pt, Tahira Carias LPN and Sandra/admissions at [facility] of transportation timing. Attempted to call  with no answer and Nicole Toledo states she will update him. Updated Stalin Hogue and Dr. Kandis Solorio of need for JOHANNE.       4:00 PM  Faxed AVS and completed packet. Pt new to [facility] at discharge. Please call report to [phone].

## 2020-07-30 NOTE — DISCHARGE SUMMARY
tartrate (LOPRESSOR) 25 MG tablet  Take 1 tablet by mouth 2 times daily             Multiple Vitamins-Minerals (MULTIVITAMIN ADULTS PO)  Take 1 tablet by mouth daily             Omega-3 Fatty Acids (FISH OIL PO)  Take 1,000 mg by mouth daily as needed             potassium chloride (KLOR-CON M) 20 MEQ extended release tablet  Take 2 tablets by mouth 2 times daily for 5 days             QUEtiapine (SEROQUEL) 50 MG tablet  Take 1 tablet by mouth nightly             rifaximin (XIFAXAN) 550 MG tablet  Take 1 tablet by mouth 2 times daily             spironolactone (ALDACTONE) 25 MG tablet  Take 1 tablet by mouth daily             torsemide (DEMADEX) 20 MG tablet  Take 1 tablet by mouth daily             UNABLE TO FIND  Shower rail             UNABLE TO FIND  Shower chair/stool                 Objective Findings at Discharge:   /64   Pulse 66   Temp 97.8 °F (36.6 °C) (Oral)   Resp 16   Ht 5' 2\" (1.575 m)   Wt 76 lb 6.4 oz (34.7 kg)   SpO2 97%   BMI 13.97 kg/m²            PHYSICAL EXAM  GEN: Awake female, thin, frail appearing and malnourished female. EYES: Sclera anicteric. HENT: Membranes dry. No nasal discharge. Normocephalic atraumatic. NECK: Supple,  RESP: No wheezing. No crackles. Symmetric breath sounds. CV: RRR. No pitting lower extremity edema. GI: Abdomen is soft, nondistended. : No Butler in place. MSK: No bone fractures. No gross deformities. SKIN: warm, dry, no rashes, no pressure ulcer  PSYCH: Awake, alert and oriented to self and location. BMP/CBC  Recent Labs     07/28/20  0636      K 4.0      CO2 29   BUN 18   CREATININE 1.0       IMAGING:  CT head  No acute intracranial abnormality.      Discharge Time of 35 minutes    Electronically signed by Salvador Meadows MD on 7/30/2020 at 3:10 PM

## 2020-07-30 NOTE — PROGRESS NOTES
Physical Therapy      Physical Therapy Treatment Note  Name: Theodore Bedolla MRN: [id number] :   1935   Date:  2020   Admission Date: 2020 Room:  [address] [place] [address]-A   Restrictions/Precautions:  General Precautions, Fall Risk,   Communication with other providers:  Handoff to Nursing    Subjective:  Patient states: \"Yes, I can put them on. \" (re: socks)  Pain:  Pt denies pain this session. Objective:    Observation:  Pt sitting in chair, granddaughter present, agreeable to therapy. Pt noted to be incontinent at beginning of session. Treatment, including education/measures:  Therapeutic Activities  Pt donned socks sitting at edge of chair, SBA for balance. Pt completed sit <> stand to RW x5, SBA-CGA and cues for hand placement and safety. Pt stood to RW ~2 minutes while therapist performed pericare, SBA-CGA for balance. Pt ambulated ~25' + ~75' with RW, CGA-MIN A. Pt with good mariposa and mild unsteadiness with no LOB. Pt required verbal/tactile cues for walker management especially with turns. Pt educated on safe use of RW. Pt needed to be re-oriented/re-directed often throughout session this date. Assessment / Impression:    Patient's tolerance of treatment:  Good  Adverse Reaction:  None  Significant change in status and impact:  None  Barriers to improvement:  Decreased cognition    Plan for Next Session:    Continue PT POC with focus on gait training and pt requiring less cues for walker management.     Time in: 1400  Time out:  1433  Timed treatment minutes: 33  Total treatment time:  35    Previously filed items:  Short term goals  Time Frame for Short term goals: 1 week  Short term goal 1: Pt will perform sit><supine SBA  Short term goal 2: Pt will transfer to all surfaces SBA  Short term goal 3: Pt will ambulate 50ft with LRAD SBA  Short term goal 4: Pt will perform standing light dynamic activity with single UE support SBA x 3 minutes    Electronically signed by:    Candy Gutierres Tobi, PT  7/30/2020, 2:58 PM

## 2020-07-30 NOTE — PROGRESS NOTES
Hospitalist Progress Note      Name:  Alysia Vera /Age/Sex: 1935  (80 y.o. female)   MRN & CSN:  [id number] & [id number] Admission Date/Time: 2020  5:41 AM   Location:  8177/9888-X PCP: [facility] Day: 6    ASSESSMENT & PLAN:   Alysia Vera is a 80 y.o.  female  who presented to the hospital with worsening confusion. She has a known history of decompensated cirrhosis with ascites. --- Hepatic encephalopathy-- more awake and alert today. Answers questions appropriately  Also has underlying dementia which compounds the encephalopathy. Continue lactulose and rifaximin  Titrate to 2-3 loose bowel movements per day    --- Severe dementia- Continue donepezi  --- Decompensated cirrhosis with ascites---   S/p paracentesis on  with removal of 1.6 L. Plan  Aldactone 25 daily  Torsemide 20 daily    Hypertension- Lopressor 25 twice daily  Severe protein calorie malnutrition----weight 80 pounds, BMI 14.6  Low-sodium diet  Protein supplements      MEDICAL DECISION MAKING:  -Labs reviewed  -Imaging reviewed  -Level of risk moderate  Diet DIET LOW SODIUM 2 GM;   DVT Prophylaxis [] Lovenox, []  Heparin, [] SCDs, [] Ambulation   GI Prophylaxis [] PPI,  [] H2 Blocker,  [] Carafate,  [] Diet/Tube Feeds   Code Status DNR-CCA   Disposition  SNF, awaiting placement. MDM [] Low, [x] Moderate,[]  High     Chief complaint/Interval History/ROS     Chief Complaint: Encephalopathy      INTERVAL HISTORY: Awake and alert, sitting out of bed. Having regular bowel movements. ROS:   Denies abdominal pain. Objective: Intake/Output Summary (Last 24 hours) at 2020 1158  Last data filed at 2020 1022  Gross per 24 hour   Intake 250 ml   Output --   Net 250 ml      Vitals:   Vitals:    20 1015   BP: 132/64   Pulse: 66   Resp: 16   Temp: 97.8 °F (36.6 °C)   SpO2: 97%     Physical Exam:   GEN: Awake female, thin, frail appearing and malnourished female.   EYES:

## 2020-07-30 NOTE — PROGRESS NOTES
Occupational Therapy      Occupational Therapy Treatment Note  Name: Braden San MRN: [id number] :   1935   Date:  2020   Admission Date: 2020 Room:  [address] [place] [address]A   Restrictions/Precautions:    General Precautiosn, Fall Risk,   Communication with other providers:  Nursing handoff  Subjective:  Patient states:  \"I can't sit down my pants are dirty\"  Pain:   Location, Type, Intensity (0/10 to 10/10): Denies  Objective:    Observation:  Pt received supine in bed, soiled self, agreeable to therapy  Objective Measures:  WFL  Treatment, including education:  Self Care Training:   Cues were given for safety, sequence, UE/LE placement, visual cues, and balance. Activities performed today included LB bathing/dressing, toileting, grooming    Therapeutic Activity Training:   Therapeutic activity training was instructed today. Cues were given for safety, sequence, UE/LE placement, awareness, and balance. Activities performed today included bed mobility training, sup-sit, sit-stand, functional mobility w/ RW, stand to sit. Supine to sit Leatha, sitting EOB SBA, sit to stand from EOB up to RW CGA, functional mobility in room w/ RW Leatha, pt soiled self, LB dressing doffing depends modA, LB bathing modA to wash buttocks/marisa area.  Stand to sit CGA w/ instructions in proper hand placement, pt sitting upright in chair, LIVAN on, call light at side, nursing notified      Assessment / Impression:        Patient's tolerance of treatment:  Pt tolerated treatment well   Adverse Reaction: none  Significant change in status and impact:  none  Barriers to improvement:  Decreased cognition  Plan for Next Session:    Pt will perform LB dressing/bathing w/ decreased assistance    Time in:  839  Time out:  904  Timed treatment minutes:  25 minutes  Total treatment time:  25 minutes  Electronically signed by:    Ani DURHAM/L [id number]  9:13 AM,2020     Previously filed values:    Goals:  Pt goal: go home  Time Frame for STGs: discharge  Goal 1: Pt will perform UE ADLs SBA  Goal 2: Pt will perform LE ADLs SBA  Goal 3: Pt will perform toileting SBA  Goal 4: Pt will perform functional transfer w/ AD Supervision  Goal 5: Pt will perform functional mobility w/ AD Supervision  Goal 6: Pt will perform therex/theract in order to increase functional activity tolerance and dynamic standing balance

## 2020-08-10 PROBLEM — E87.5 HYPERKALEMIA: Status: ACTIVE | Noted: 2020-01-01

## 2020-08-10 NOTE — ED NOTES
Bed Alarm placed on pt due to dementia and alzheimer's related confusion and risk of fall       Rox Guthrie RN  08/10/20 7874

## 2020-08-10 NOTE — H&P
History and Physical      Name:  Yakelin Zurita /Age/Sex: 1935  (80 y.o. female)   MRN & CSN:  [id number] & [id number] Admission Date/Time: 8/10/2020  4:49 AM   Location:  [facility] PCP: April Ospina PA-C       Yakelin Zurita is a 80 y.o.  female  who presents with Fall      Assessment and Plan:   Right Femur Fx 2/2 Fall  - imaging reviewed  - pain mx prn  - d/w ortho NPO @ [facility] and to OR tomorrow if K normalized.   - ortho consulted from ED    HyperK  - given kayexalate, insulin and bicarb in ED, will give ca gluconate  - re-check this afternoon  - hold K supplement and aldactone    OREN on CKD  - nephro following    Dementia  HTN  HLD        Diet No diet orders on file   Code Status Prior     Medications:   Medications:    Infusions:    sodium chloride 100 mL/hr at 08/10/20 0649    dextrose       PRN Meds: ondansetron, 4 mg, Q30 Min PRN  glucose, 15 g, PRN  dextrose, 12.5 g, PRN  glucagon (rDNA), 1 mg, PRN  dextrose, 100 mL/hr, PRN        Current Facility-Administered Medications:     0.9 % sodium chloride infusion, , Intravenous, Continuous, Josseline Orozco MD, Last Rate: 100 mL/hr at 08/10/20 0649    ondansetron (ZOFRAN) injection 4 mg, 4 mg, Intravenous, Q30 Min PRN, Josseline Orozco MD    glucose (GLUTOSE) 40 % oral gel 15 g, 15 g, Oral, PRN, Josseline Orozco MD    dextrose 50 % IV solution, 12.5 g, Intravenous, PRN, Josseline Orozco MD    glucagon (rDNA) injection 1 mg, 1 mg, Intramuscular, PRN, Josseline Orozco MD    dextrose 5 % solution, 100 mL/hr, Intravenous, PRN, Josseline Orozco MD    Current Outpatient Medications:     metoprolol tartrate (LOPRESSOR) 25 MG tablet, Take 1 tablet by mouth 2 times daily, Disp: 60 tablet, Rfl: 3    spironolactone (ALDACTONE) 25 MG tablet, Take 1 tablet by mouth daily, Disp: 30 tablet, Rfl: 3    torsemide (DEMADEX) 20 MG tablet, Take 1 tablet by mouth daily, Disp: 60 tablet, Rfl: 2    rifaximin (XIFAXAN) 550 MG tablet, Take 1 tablet by mouth 2 times daily, Disp: 42 tablet, Rfl:     Multiple Vitamins-Minerals (MULTIVITAMIN ADULTS PO), Take 1 tablet by mouth daily, Disp: , Rfl:     Omega-3 Fatty Acids (FISH OIL PO), Take 1,000 mg by mouth daily as needed, Disp: , Rfl:     lactulose (CHRONULAC) 10 GM/15ML solution, Take 30 g by mouth daily, Disp: , Rfl:     potassium chloride (KLOR-CON M) 20 MEQ extended release tablet, Take 2 tablets by mouth 2 times daily for 5 days, Disp: 20 tablet, Rfl: 0    magnesium oxide (MAG-OX) 400 (240 Mg) MG tablet, Take 1 tablet by mouth daily (Patient taking differently: Take 400 mg by mouth 2 times daily ), Disp: 30 tablet, Rfl: 5    UNABLE TO FIND, Shower rail, Disp: 1 Units, Rfl: 0    UNABLE TO FIND, Shower chair/stool, Disp: 1 Units, Rfl: 0    QUEtiapine (SEROQUEL) 50 MG tablet, Take 1 tablet by mouth nightly, Disp: 90 tablet, Rfl: 3    donepezil (ARICEPT) 10 MG tablet, Take 1 tablet by mouth nightly, Disp: 90 tablet, Rfl: 1    History of present illness     Chief Complaint: Fall      Felicitas Holliday is a 80 y.o.  female  who presents with fall. Pt has severe dementia so hx unable to be taken. Per chart review pt had a fall at [facility] and bruising noted on hip was brought to ED and noted to have right hip fx. Also had high potassium. Pt in no distress and confused at this time. Review of Systems : Ten point ROS reviewed and negative, unless as noted above per HPI       Objective:   No intake or output data in the 24 hours ending 08/10/20 0955   Vitals:   Vitals:    08/10/20 0732   BP: (!) 147/65   Pulse:    Resp:    Temp:    SpO2:      Physical Exam:   Gen:  awake, alert, no apparent distress  Head/Eyes:  Normocephalic atraumatic, EOMI   NECK:   symmetrical, trachea midline  LUNGS: Normal Effort   CARDIOVASCULAR:  Normal rate  ABDOMEN:  non distended  MUSCULOSKELETAL:  ROM limited  NEUROLOGIC: confused  Cranial nerves II-XII are grossly intact.    SKIN:  Chronic bruising on legs and arms      Past Medical History:      Past Medical History:   Diagnosis Date    Arthritis     Dementia ([facility] [address]     Hyperlipidemia     Hypertension     MDRO (multiple drug resistant organisms) resistance     MRSA? PSHX:  has a past surgical history that includes Hysterectomy; Cholecystectomy;  section; Skin cancer excision; Pacemaker insertion (Left, 2013); Paracentesis (N/A, 2020); and pacemaker placement.     Allergies: No Known Allergies    FAM HX: Reviewed and non-contributory  Soc HX:   Social History     Socioeconomic History    Marital status:      Spouse name: Not on file    Number of children: Not on file    Years of education: Not on file    Highest education level: Not on file   Occupational History    Not on file   Social Needs    Financial resource strain: Not on file    Food insecurity     Worry: Not on file     Inability: Not on file    Transportation needs     Medical: Not on file     Non-medical: Not on file   Tobacco Use    Smoking status: Never Smoker    Smokeless tobacco: Never Used   Substance and Sexual Activity    Alcohol use: No    Drug use: No    Sexual activity: Never   Lifestyle    Physical activity     Days per week: Not on file     Minutes per session: Not on file    Stress: Not on file   Relationships    Social connections     Talks on phone: Not on file     Gets together: Not on file     Attends Voodoo service: Not on file     Active member of club or organization: Not on file     Attends meetings of clubs or organizations: Not on file     Relationship status: Not on file    Intimate partner violence     Fear of current or ex partner: Not on file     Emotionally abused: Not on file     Physically abused: Not on file     Forced sexual activity: Not on file   Other Topics Concern    Not on file   Social History Narrative    Not on file       LABS  Recent Labs     08/10/20  0609   WBC 11.4*   HGB 10.5*   HCT 32.6*         Recent Labs     08/10/20  0609 abnormality. SINUSES:  Visualized portions appear normally pneumatized and aerated. SOFT TISSUES/SKULL:  No obvious acute soft tissue abnormality. Moderate atherosclerotic calcifications. No acute fracture. No acute findings in the head. Ct Head Wo Contrast    Result Date: 7/24/2020  EXAMINATION: CT OF THE HEAD WITHOUT CONTRAST  7/24/2020 7:27 pm TECHNIQUE: CT of the head was performed without the administration of intravenous contrast. Dose modulation, iterative reconstruction, and/or weight based adjustment of the mA/kV was utilized to reduce the radiation dose to as low as reasonably achievable. COMPARISON: 06/05/2020 HISTORY: ORDERING SYSTEM PROVIDED HISTORY: falls, AMS TECHNOLOGIST PROVIDED HISTORY: Reason for exam:->falls, AMS Has a \"code stroke\" or \"stroke alert\" been called? ->No Reason for Exam: fall Acuity: Acute Type of Exam: Initial Mechanism of Injury: fall Relevant Medical/Surgical History: fall FINDINGS: Examination is degraded by motion. BRAIN/VENTRICLES: There is no acute intracranial hemorrhage, mass effect or midline shift. No abnormal extra-axial fluid collection. The gray-white differentiation is maintained without evidence of an acute infarct. There is no evidence of hydrocephalus. Periventricular and deep subcortical white matter hypoattenuation, consistent with microangiopathic change. Mild parenchymal volume loss. Atherosclerosis of the intracranial vasculature. Left sphenoid wing extra-axial lesion with associated calcification is unchanged in size and appearance in the interval, likely a meningioma. ORBITS: The visualized portion of the orbits demonstrate no acute abnormality. SINUSES: The visualized paranasal sinuses and mastoid air cells demonstrate no acute abnormality. SOFT TISSUES/SKULL:  No acute abnormality of the visualized skull or soft tissues. No acute intracranial abnormality. Microangiopathic change.   Left sphenoid wing lesion is not significantly changed, was performed without the administration of intravenous contrast. Multiplanar reformatted images are provided for review. Dose modulation, iterative reconstruction, and/or weight based adjustment of the mA/kV was utilized to reduce the radiation dose to as low as reasonably achievable. COMPARISON: CT cervical spine 06/05/2020 HISTORY: Multiple falls, neck pain FINDINGS: BONES/ALIGNMENT: No acute fracture evident. Stable appearing grade 1 retrolisthesis C4 on C5. DEGENERATIVE CHANGES: Diffuse mild-to-moderate degenerative changes. Mild canal stenosis appears unchanged at C4-5 measuring 9 mm AP dimension. Mild bilateral neural foraminal narrowing predominates mid cervical spine. SOFT TISSUES: There is no prevertebral soft tissue swelling. Stable meningioma adjacent to the left sphenoid bone. Calcifications involving bilateral carotid vasculature reflect calcific atherosclerosis. No acute abnormality of the cervical spine. Stable grade 1 retrolisthesis C4 on C5. Stable degenerative changes predominate at C4-5 with mild cervical spinal canal stenosis at this level. Calcifications involving bilateral carotid vasculature reflect calcific atherosclerosis. Xr Chest Portable    Result Date: 8/10/2020  EXAMINATION: ONE X-RAY VIEW OF THE CHEST 8/10/2020 6:15 am COMPARISON: Chest radiograph dated July 24, 2020 HISTORY: ORDERING SYSTEM PROVIDED HISTORY: Fall TECHNOLOGIST PROVIDED HISTORY: Reason for exam:-> Fall Reason for Exam: Fall Acuity: Acute Type of Exam: Initial FINDINGS: The cardiomediastinal silhouette is stable. A left-sided intracardiac device is seen. There is no focal consolidation, pleural effusion, or pneumothorax. There is no evidence of edema. No acute findings.      Xr Chest Portable    Result Date: 7/24/2020  EXAMINATION: ONE XRAY VIEW OF THE CHEST 7/24/2020 7:27 pm COMPARISON: 06/05/2020 HISTORY: ORDERING SYSTEM PROVIDED HISTORY: AMS TECHNOLOGIST PROVIDED HISTORY: Reason for exam:->AMS Reason for Exam: fall Acuity: Acute Type of Exam: Initial Mechanism of Injury: fall Relevant Medical/Surgical History: fall FINDINGS: Cardiac pacing device is unchanged in position. Cardiomediastinal silhouette is unchanged in size. Aortic atherosclerosis. Lungs are underinflated with linear atelectasis in the left lung base. No consolidation, pleural effusion, or pneumothorax. Osteopenia without acute osseous abnormality. No acute cardiopulmonary abnormality. Us Abdomen Limited Specify Organ? Liver    Result Date: 7/25/2020  EXAMINATION: ULTRASOUND ASCITES SURVEY 7/25/2020 8:15 am COMPARISON: 06/05/2020 HISTORY: ORDERING SYSTEM PROVIDED HISTORY: ascites  PROVIDED HISTORY: Reason for exam:->ascites survey Specify organ?->LIVER Reason for Exam: cirrhosis Type of Exam: Initial Relevant Medical/Surgical History: paracentesis 7- FINDINGS: Ascites is seen in all 4 quadrants. A moderate to large amount of ascites is present. Prominent pocket of ascites is demonstrated in the left lower quadrant. There is a moderate to large amount of ascites. Ir Us Guided Paracentesis    Result Date: 7/28/2020  PROCEDURE: ULTRASOUND GUIDED PARACENTESIS 7/27/2020 HISTORY: ORDERING SYSTEM PROVIDED HISTORY: Other ascites TECHNOLOGIST PROVIDED HISTORY: Reason for exam:->paracentesis send labs TECHNIQUE: Informed consent was obtained after a detailed explanation of the procedure including risks, benefits, and alternatives. Universal protocol was followed. The right abdomen was prepped and draped in sterile fashion and local anesthesia was achieved with lidocaine. An 8 Croatian needle sheath was advanced under ultrasound guidance into ascites and paracentesis was performed. Approximately 1625 mL of slightly cloudy yellow fluid was removed, 1 L sent for analysis. Afterwards a needle withdrawn and a sterile dressing applied. There was no bleeding or hematoma noted.   The patient tolerated the procedure well. FINDINGS: A total of 1625 mL of slightly cloudy yellow fluid removed of which 1 L was sent for analysis. Successful ultrasound guided paracentesis as above. Ir Us Guided Paracentesis    Result Date: 7/14/2020  PROCEDURE: ULTRASOUND GUIDED PARACENTESIS 7/14/2020 HISTORY: ORDERING SYSTEM PROVIDED HISTORY: Other ascites TECHNOLOGIST PROVIDED HISTORY: Reason for exam:->Ascites TECHNIQUE: Informed consent was obtained after a detailed explanation of the procedure including risks, benefits, and alternatives. Universal protocol was followed. The right abdomen was prepped and draped in sterile fashion and local anesthesia was achieved with lidocaine. An 8 Bots[place]an needle sheath was advanced under ultrasound guidance into ascites and paracentesis was performed. Approximately 5.7 L of clear yellow fluid was removed. The patient tolerated the procedure well. The patient was given 50 g of albumin intravenously. FINDINGS: A total of 5.7 L of clear yellow fluid was removed. Successful ultrasound guided paracentesis.        Electronically signed by Cassandra Irwin MD on 8/10/2020 at 9:55 AM

## 2020-08-10 NOTE — CONSULTS
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 female who was transported to [facility] ED on 8/10  From [facility]. It is reported that patient had a recent fall at her  Residence for which she had been complaining of bilateral hip pain. X-ray of the pelvis demonstrated: Right intertrochanteric femur fracture    Patient denies any chest or abdominal pain during our visit    REVIEW OF SYSTEMS:  The ten point review of systems   are negative except as mentioned above. 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 SOCIAL HISTORY:   Tobacco: denies use     Alcohol: denies use     Recreational Drug Use: denies use     Demographic History; prior to admission: residing at [address] [place] [address] of pertinent lab work and diagnostics available thus far:   X-ray of pelvis demonstrates acute closed non-displaced  Right intertrochanteric femur fracture (8/10)    Medications:   Scheduled Meds:   insulin regular  10 Units Intravenous Once    And    dextrose  25 g Intravenous Once    sodium bicarbonate  50 mEq Intravenous Once    sodium polystyrene  15 g Oral Once     Continuous Infusions:   sodium chloride 100 mL/hr at 08/10/20 0649    dextrose       PRN Meds:.ondansetron, glucose, dextrose, glucagon (rDNA), dextrose    Allergies:  Patient has no known allergies.     Family History:       Problem Relation Age of Onset    Diabetes Mother     Other Father         black lung       Physical Exam:    Vitals: BP (!) 147/65   Pulse 73   Temp 97.5 °F (36.4 °C) (Oral)   Resp 12   SpO2 100%     General appearance:  awake and making eye contact; verbal, but  Only answering yes or no questions   HEENT: Head: Normal, normocephalic, area of bruising to left cheek area   Neck: supple, symmetrical, trachea midline  Cardiovascular: S1 and S2: normal / no rub  Pulmonary: diminished lung sounds bilaterally  Abdomen:  soft / non-tender   Extremities: Trace edema to bilateral lower legs     CBC:   Recent Labs     08/10/20  0609   WBC 11.4*   HGB 10.5*        BMP:    Recent Labs     08/10/20  0609   *   K 6.2*      CO2 24   BUN 28*   CREATININE 1.2*   GLUCOSE 124*     Hepatic:   Recent Labs     08/10/20  0609   AST 63*   ALT 38   BILITOT 0.5   ALKPHOS 124     Troponin: No results for input(s): TROPONINI in the last 72 hours. Mg, Phos: No results for input(s): MG, PHOS in the last 72 hours. ABGs: No results found for: PHART, PO2ART, PAK0HOT  INR: No results for input(s): INR in the last 72 hours.   -----------------------------------------------------------------  Patient Active Problem List   Diagnosis Code    Cardiac pacemaker Z95.0    Hyperlipidemia with target LDL less than 100 E78.5    HTN (hypertension) I10    Pulmonary hypertension (HCC) I27.20    Recurrent major depressive disorder, in partial remission ([facility] [address] F33.41    Late onset Alzheimer's disease with behavioral disturbance (HCC) G30.1, F02.81    Age-related osteoporosis without current pathological fracture M81.0    Abnormal CT of the head R93.0    Meningioma (HCC) D32.9    Hallucinations due to late onset dementia ([facility] [address] F03.90, R44.3    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    Other ascites R18.8    Anasarca R60.1    Cirrhosis of liver (HCC) K74.60    Hypoalbuminemia E88.09    Renal failure N19    Female bladder prolapse N81.10    Acute encephalopathy G93.40     Assessment and Recommendations     Impression   1. 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 Pre-renal azotemia)  -likely etiology for OREN: intravascular volume depletion   Accentuated by Torsemide and spironolactone and also lactulose     2. Hyperkalemia   3. Right intertrochanteric femur fracture   4. HTN   5. Metabolic Encephalopathy   6. Anemia   7. Cirrhosis of liver     PLAN   1.   -bladder scan ordered to screen for urinary retention   -additional labs: upc, chemistry panel qam, ua with micro  -currently on NS at 100 / hour   2. -BMP this afternoon to recheck serum potassium   -given single dose of Kayexalate   -completed in ED: regular insulin / D50 / Bicarb   3.   -referral to Orthopedics for fracture management   4.    -BP trend: systolic BP's have recently been 143 - 148  -not currently on any blood pressure medications   5.   -monitor affect and sensorium with history of dementia   6.   -latest Hb: 10.5; follow hemoglobin trend   7.    -no overt findings of decompensated liver failure     Electronically signed by ZINA Holcomb - CNP      Pt seen , examined and chart reviewed   1 am very familiar with her   She  Had  stage 3 OREN last mo  I saw her in my office in 7/20 and arranged for paracentesis  Her k was low k and k and MRA was  Added   In general she has progressive dementia and cirrhosis with frequent decompensation  Now she comes after fall with hip fx and OREN/ high k  And confusion   So OREN likely from hemodynamic  Changes with poor po  and diuretics at home   A but make sure her bladder is not obstructed  Also higk k could have  been from OREN- MRA/K supplement ior occult bleed -   K better  now  Bladder scan \  Plain Ua   Need good  D/w   about her future therapeutic approach - placement   Also adjust meds   'r/o any ac infection cleo with ascites   Follow clinically and bio chemically

## 2020-08-10 NOTE — CONSULTS
ORTHOPEDIC CONSULT      2020    Patient name: Tara Mcmanus  : 1935    CHIEF COMPLAINT  Right hip pain    HPI  The patient was seen and examined. Tara Mcmanus is a 80 y.o. female who is admitted for the above chief complaint. Orthopedic service consulted for evaluation of right hip pain. Nurse at bedside and gives me history. Patient unable to give history due to dementia. When asked questions, she responds but not appropriate to questions asked. Does note pain into right hip on exam. Per nurse, she has had multiple falls recently. She came into the ED from [facility]. Apparently they obtained x-rays confirming right hip fracture. X-rays in the ED do confirm right intertrochanteric hip/femur fracture. Orthopedics consulted for further recommendations/evaluation. PAST MEDICAL HISTORY  Past Medical History:   Diagnosis Date    Arthritis     Dementia ([address]     Hyperlipidemia     Hypertension     MDRO (multiple drug resistant organisms) resistance     MRSA? CURRENT MEDICATIONS  Prior to Admission medications    Medication Sig Start Date End Date Taking?  Authorizing Provider   metoprolol tartrate (LOPRESSOR) 25 MG tablet Take 1 tablet by mouth 2 times daily 20   Alek Theodore MD   spironolactone (ALDACTONE) 25 MG tablet Take 1 tablet by mouth daily 20   Alek Theodore MD   torsemide (DEMADEX) 20 MG tablet Take 1 tablet by mouth daily 20   Alek Theodore MD   rifaximin (XIFAXAN) 550 MG tablet Take 1 tablet by mouth 2 times daily 20   Alek Theodore MD   Multiple Vitamins-Minerals (MULTIVITAMIN ADULTS PO) Take 1 tablet by mouth daily    Historical Provider, MD   Omega-3 Fatty Acids (FISH OIL PO) Take 1,000 mg by mouth daily as needed    Historical Provider, MD   lactulose (CHRONULAC) 10 GM/15ML solution Take 30 g by mouth daily    Historical Provider, MD   potassium chloride (KLOR-CON M) 20 MEQ extended release tablet Take 2 tablets by mouth 2 times daily for 5 days 20  Daniella Duarte MD   magnesium oxide (MAG-OX) 400 (240 Mg) MG tablet Take 1 tablet by mouth daily  Patient taking differently: Take 400 mg by mouth 2 times daily  20   Daniella Duarte MD   UNABLE TO FIND Shower rail 20   Ej Reyes PA-C   UNABLE TO FIND Shower chair/stool 20   Ej Reyes PA-C   QUEtiapine (SEROQUEL) 50 MG tablet Take 1 tablet by mouth nightly 20   Ej Reyes PA-C   donepezil (ARICEPT) 10 MG tablet Take 1 tablet by mouth nightly 20   Ej Reyes PA-C       ALLERGIES  No Known Allergies    SURGICAL HISTORY  Past Surgical History:   Procedure Laterality Date     SECTION      CHOLECYSTECTOMY      HYSTERECTOMY      PACEMAKER INSERTION Left 2013    PACEMAKER PLACEMENT      PARACENTESIS N/A 2020    IR    SKIN CANCER EXCISION         FAMILY HISTORY  Family History   Problem Relation Age of Onset    Diabetes Mother     Other Father         black lung       SOCIAL HISTORY  Social History     Socioeconomic History    Marital status:      Spouse name: Not on file    Number of children: Not on file    Years of education: Not on file    Highest education level: Not on file   Occupational History    Not on file   Social Needs    Financial resource strain: Not on file    Food insecurity     Worry: Not on file     Inability: Not on file    Transportation needs     Medical: Not on file     Non-medical: Not on file   Tobacco Use    Smoking status: Never Smoker    Smokeless tobacco: Never Used   Substance and Sexual Activity    Alcohol use: No    Drug use: No    Sexual activity: Never   Lifestyle    Physical activity     Days per week: Not on file     Minutes per session: Not on file    Stress: Not on file   Relationships    Social connections     Talks on phone: Not on file     Gets together: Not on file     Attends Restoration service: Not on file     Active member of club or organization: Not on file     Attends meetings of clubs or organizations: Not on file     Relationship status: Not on file    Intimate partner violence     Fear of current or ex partner: Not on file     Emotionally abused: Not on file     Physically abused: Not on file     Forced sexual activity: Not on file   Other Topics Concern    Not on file   Social History Narrative    Not on file       REVIEW OF SYSTEMS  Comprehensive ROS completed. In specific,  - Reviewed in chart. Patient unable to provide hx with hx dementia    All other systems reviewed and are negative unless otherwise stated above or in the HPI. PHYSICAL EXAM  VITAL SIGNS: /64   Pulse 65   Temp 97.5 °F (36.4 °C) (Oral)   Resp 14   Wt 79 lb 3.2 oz (35.9 kg)   SpO2 98%   BMI 14.49 kg/m²   General Appearance Alert, No acute distress, hx dementia and unable to give history    Eyes clear   Ears, Nose, Throat clear    Neck    Respiratory    Cardiovascular    Gastrointestinal    Lymphatics    Musculoskeletal RLE- SILT; CR <2 sec; leg slightly shortened and ER; thigh and calf soft/NT; pain into hip with gentle logroll; NV intact; wiggles toes and PF/DF ankle; pain into hip with palpation. LLE- no noted deformities; no reactivity with motion of hip or knee; leg soft; wiggles toes and PF/DF ankle. Skin Normal. No rash or lesions   Neurological Awake, alert. No focal deficits.  Motor and sensory intact   Psychiatric Normal       LABS   Recent Labs     08/10/20  0609 08/10/20  1258 08/10/20  1547 08/11/20  0441   WBC 11.4*  --   --  9.5   HCT 32.6*  --   --  28.6*     --   --  131*   * 135 134* 136   K 6.2* 4.9 5.0 4.2    103 105 107   CO2 24 24 20* 23   BUN 28* 25* 24* 23   CREATININE 1.2* 1.1 1.1 1.0   CALCIUM 9.4 9.0 8.7 8.7   PHOS  --   --   --  4.0   AST 63*  --   --   --    ALT 38  --   --   --      No components found for: HGBA1C    IMAGING  Narrative    EXAMINATION:    4 X-RAY VIEWS OF THE RIGHT FEMUR         8/10/2020 9:48 am      COMPARISON:    Pelvic radiograph performed earlier in the same day.         HISTORY:    ORDERING SYSTEM PROVIDED HISTORY: Ortho PA asked for this to be ordered. TECHNOLOGIST PROVIDED HISTORY:    Reason for exam:->Ortho PA asked for this to be ordered. Reason for Exam: Ortho PA asked for this to be ordered.         FINDINGS:    There is a closed impacted intertrochanteric slightly varus angulated    fracture of the proximal right femur.  The knee and hip joints are    maintained.  The bones are osteopenic.              Impression    Impacted varus angulated closed intertrochanteric fracture of proximal right    femur.                  ASSESSMENT       80 y.o. female with right intertrochanteric femur fracture   Dementia- unable to provide history   PLAN   1. Recommend Intramedullary Nail Fixation of Right Femur/hip. Will need to obtain consent from her POA. Discussed with nurse about assistance obtaining this information. X-rays of femur. 2. Goal for surgery tomorrow if medically optimized for  OR. Continue hospitalist recommendations- was found to have elevated potassium level in ED. Please work to improve and recheck prior to surgery. 3. Bedrest  4. NPO after midnight  5. Ancef 2g IV on call to OR. 6. Discussed with Dr. Santo Salgado.    Nursing commun to call family in  I called and left message yesterday to  Litzy Hampton     Electronically signed by: Warden Trish MD, 8/11/2020 6:08 AM

## 2020-08-10 NOTE — ED NOTES
Pt comes from [facility] with paperwork documenting a right hip fracture.  Pt reports she fell off her porch but does not remember when this fall occured     Armen Nj RN  08/10/20 1696

## 2020-08-10 NOTE — ED PROVIDER NOTES
Triage Chief Complaint:   Fall      [place]:  Dottie Garrison is a 80 y.o. female that presents to the emergency department after a fall. Patient sent over from [facility]. Apparently there was an x-ray done of her right hip last night after fall that showed a right intertrochanteric fracture. Patient has dementia and is unable to provide much information. She apparently told the nurses at the facility that she was having pain in both hips. She is answering my questions inappropriately as she does have a history of dementia. She does have several bruises to extremities and 1 to her left forehead that is per EMS the facility told them was from a prior fall. Patient is not on any blood thinning medications per chart review. She is unable to tell me where and how much pain she is having. . Per chart review patient does have a history of hyperammonemia and is supposed to be taking medications at the facility to control this. Past Medical History:   Diagnosis Date    Arthritis     Dementia ([address]     Hyperlipidemia     Hypertension     MDRO (multiple drug resistant organisms) resistance     MRSA?      Past Surgical History:   Procedure Laterality Date     SECTION      CHOLECYSTECTOMY      HYSTERECTOMY      PACEMAKER INSERTION Left 2013    PACEMAKER PLACEMENT      PARACENTESIS N/A 2020    IR    SKIN CANCER EXCISION       Family History   Problem Relation Age of Onset    Diabetes Mother     Other Father         black lung     Social History     Socioeconomic History    Marital status:      Spouse name: Not on file    Number of children: Not on file    Years of education: Not on file    Highest education level: Not on file   Occupational History    Not on file   Social Needs    Financial resource strain: Not on file    Food insecurity     Worry: Not on file     Inability: Not on file    Transportation needs     Medical: Not on file     Non-medical: Not on file Tobacco Use    Smoking status: Never Smoker    Smokeless tobacco: Never Used   Substance and Sexual Activity    Alcohol use: No    Drug use: No    Sexual activity: Never   Lifestyle    Physical activity     Days per week: Not on file     Minutes per session: Not on file    Stress: Not on file   Relationships    Social connections     Talks on phone: Not on file     Gets together: Not on file     Attends Advent service: Not on file     Active member of club or organization: Not on file     Attends meetings of clubs or organizations: Not on file     Relationship status: Not on file    Intimate partner violence     Fear of current or ex partner: Not on file     Emotionally abused: Not on file     Physically abused: Not on file     Forced sexual activity: Not on file   Other Topics Concern    Not on file   Social History Narrative    Not on file     Current Facility-Administered Medications   Medication Dose Route Frequency Provider Last Rate Last Dose    0.9 % sodium chloride infusion   Intravenous Continuous Lizeth Noble  mL/hr at 08/10/20 0649      ondansetron (ZOFRAN) injection 4 mg  4 mg Intravenous Q30 Min PRN Lizeth Noble MD        insulin regular (HUMULIN R;NOVOLIN R) injection 10 Units  10 Units Intravenous Once Lizeth Noble MD        And    dextrose 50 % IV solution  25 g Intravenous Once Lizeth Noble MD        glucose (GLUTOSE) 40 % oral gel 15 g  15 g Oral PRN Lizeth Noble MD        dextrose 50 % IV solution  12.5 g Intravenous PRN Lizeth Noble MD        glucagon (rDNA) injection 1 mg  1 mg Intramuscular PRN Lizeth Noble MD        dextrose 5 % solution  100 mL/hr Intravenous PRN Lizeth Noble MD        sodium bicarbonate 8.4 % injection 50 mEq  50 mEq Intravenous Once Lizeth Noble MD        sodium polystyrene (KAYEXALATE) 15 GM/60ML suspension 15 g  15 g Oral Once Lizeth Noble MD         Current Outpatient Medications   Medication Sig Dispense Refill    metoprolol tartrate (LOPRESSOR) 25 MG tablet Take 1 tablet by mouth 2 times daily 60 tablet 3    spironolactone (ALDACTONE) 25 MG tablet Take 1 tablet by mouth daily 30 tablet 3    torsemide (DEMADEX) 20 MG tablet Take 1 tablet by mouth daily 60 tablet 2    rifaximin (XIFAXAN) 550 MG tablet Take 1 tablet by mouth 2 times daily 42 tablet     Multiple Vitamins-Minerals (MULTIVITAMIN ADULTS PO) Take 1 tablet by mouth daily      Omega-3 Fatty Acids (FISH OIL PO) Take 1,000 mg by mouth daily as needed      lactulose (CHRONULAC) 10 GM/15ML solution Take 30 g by mouth daily      potassium chloride (KLOR-CON M) 20 MEQ extended release tablet Take 2 tablets by mouth 2 times daily for 5 days 20 tablet 0    magnesium oxide (MAG-OX) 400 (240 Mg) MG tablet Take 1 tablet by mouth daily (Patient taking differently: Take 400 mg by mouth 2 times daily ) 30 tablet 5    UNABLE TO FIND Shower rail 1 Units 0    UNABLE TO FIND Shower chair/stool 1 Units 0    QUEtiapine (SEROQUEL) 50 MG tablet Take 1 tablet by mouth nightly 90 tablet 3    donepezil (ARICEPT) 10 MG tablet Take 1 tablet by mouth nightly 90 tablet 1     No Known Allergies  Nursing Notes Reviewed    ROS:  At least 10 systems reviewed and otherwise negative except as in the [place]. Physical Exam:  ED Triage Vitals   Enc Vitals Group      BP 08/10/20 0452 (!) 148/81      Pulse 08/10/20 0452 77      Resp 08/10/20 0503 12      Temp 08/10/20 0452 97.5 °F (36.4 °C)      Temp Source 08/10/20 0452 Oral      SpO2 08/10/20 0452 98 %      Weight --       Height --       Head Circumference --       Peak Flow --       Pain Score --       Pain Loc --       Pain Edu? --       Excl. in [address]? --      My pulse oximetry interpretation is which is within the normal range    GENERAL APPEARANCE: Awake and alert. Cooperative. No acute distress. Patient with dementia, poor historian  HEAD: Normocephalic. Atraumatic. EYES: EOM's grossly intact. Sclera anicteric.   ENT: Mucous membranes are moist. Tolerates saliva. No trismus. NECK: Supple. No meningismus. Trachea midline. HEART: RRR. Radial pulses 2+. LUNGS: Respirations unlabored. CTAB. Equal chest rise. Equal breath sounds. No chest wall crepitus. No bruising. No signs of trauma. BACK: No bruising. No tenderness. No CVA tenderness. No step-offs. ABDOMEN: Soft. Non-tender. No guarding or rebound. Normal bowel sounds. EXTREMITIES: Patient with mild tenderness to both hips. SKIN: Warm and dry. NEUROLOGICAL: No gross facial drooping. Moves all 4 extremities spontaneously. Patient with dementia  PSYCHIATRIC: unable to assess, poor historian.      I have reviewed and interpreted all of the currently available lab results from this visit (if applicable):  Results for orders placed or performed during the hospital encounter of 08/10/20   CBC with Auto Diff   Result Value Ref Range    WBC 11.4 (H) 4.0 - 10.5 K/CU MM    RBC 3.43 (L) 4.2 - 5.4 M/CU MM    Hemoglobin 10.5 (L) 12.5 - 16.0 GM/DL    Hematocrit 32.6 (L) 37 - 47 %    MCV 95.0 78 - 100 FL    MCH 30.6 27 - 31 PG    MCHC 32.2 32.0 - 36.0 %    RDW 15.6 (H) 11.7 - 14.9 %    Platelets 967 633 - 126 K/CU MM    MPV 11.6 (H) 7.5 - 11.1 FL    Differential Type AUTOMATED DIFFERENTIAL     Segs Relative 78.2 (H) 36 - 66 %    Lymphocytes % 11.3 (L) 24 - 44 %    Monocytes % 9.4 (H) 0 - 4 %    Eosinophils % 0.2 0 - 3 %    Basophils % 0.5 0 - 1 %    Segs Absolute 8.9 K/CU MM    Lymphocytes Absolute 1.3 K/CU MM    Monocytes Absolute 1.1 K/CU MM    Eosinophils Absolute 0.0 K/CU MM    Basophils Absolute 0.1 K/CU MM    Nucleated RBC % 0.0 %    Total Nucleated RBC 0.0 K/CU MM    Total Immature Neutrophil 0.05 K/CU MM    Immature Neutrophil % 0.4 0 - 0.43 %   CMP   Result Value Ref Range    Sodium 131 (L) 135 - 145 MMOL/L    Potassium 6.2 (HH) 3.5 - 5.1 MMOL/L    Chloride 100 99 - 110 mMol/L    CO2 24 21 - 32 MMOL/L    BUN 28 (H) 6 - 23 MG/DL    CREATININE 1.2 (H) 0.6 - 1.1 MG/DL Glucose 124 (H) 70 - 99 MG/DL    Calcium 9.4 8.3 - 10.6 MG/DL    Alb 3.2 (L) 3.4 - 5.0 GM/DL    Total Protein 8.0 6.4 - 8.2 GM/DL    Total Bilirubin 0.5 0.0 - 1.0 MG/DL    ALT 38 10 - 40 U/L    AST 63 (H) 15 - 37 IU/L    Alkaline Phosphatase 124 40 - 129 IU/L    GFR Non- 43 (L) >60 mL/min/1.73m2    GFR  52 (L) >60 mL/min/1.73m2    Anion Gap 7 4 - 16        Radiographs:  [] Radiologist's Wet Read Report Reviewed:      CT HEAD WO CONTRAST (Final result)   Result time 08/10/20 07:03:48   Final result by Lissy Sun MD (08/10/20 07:03:48)                 Impression:     No acute findings in the head. Narrative:     EXAMINATION:   CT OF THE HEAD WITHOUT CONTRAST     8/10/2020 6:46 am     TECHNIQUE:   CT of the head was performed without the administration of intravenous   contrast. Dose modulation, iterative reconstruction, and/or weight based   adjustment of the mA/kV was utilized to reduce the radiation dose to as low   as reasonably achievable. COMPARISON:   Head CT 07/24/2020     HISTORY:   ORDERING SYSTEM PROVIDED HISTORY: fall   TECHNOLOGIST PROVIDED HISTORY:   Reason for exam:->fall   Has a \"code stroke\" or \"stroke alert\" been called? ->No   Reason for Exam: fall     FINDINGS:   BRAIN/VENTRICLES:  No masses nor acute intracranial hemorrhage.  Intact   gray/white matter differentiation without findings of acute ischemia.  No   mass effect nor midline shift.  Patent basilar cisterns and foramen magnum. No hydrocephalus.  Age-appropriate mild to moderate diffuse atrophy. Moderate to severe subcortical, deep, periventricular white matter   hypodensities likely due to chronic small vessel ischemia.  Old lacunar   infarcts in the bilateral basal ganglia. ORBITS:  Bilateral lens implants.  Visualized portions otherwise appear   normal without acute abnormality. SINUSES:  Visualized portions appear normally pneumatized and aerated.      SOFT TISSUES/SKULL:  No obvious acute soft tissue abnormality.  Moderate   atherosclerotic calcifications.  No acute fracture.                       CT CERVICAL SPINE WO CONTRAST (Final result)   Result time 08/10/20 07:10:32   Final result by Vince Mas MD (08/10/20 07:10:32)                 Impression:     1. No acute findings in the cervical spine. 2. Mild to moderate cervical spine degenerative changes most severe at C4/C5   and C5/C6. 3. Bony demineralization. 4. Heterogeneous thyroid containing nodules measuring up to approximately 1.5   cm.  Recommend further evaluation with sonography on a nonemergent basis as   below. RECOMMENDATIONS:   1.5 cm incidental thyroid nodule. Recommend thyroid US if clinically   warranted given patient age. Reference: J Am Christo Radiol. 2015 Feb;12(2): 143-50             Narrative:     EXAMINATION:   CT OF THE CERVICAL SPINE WITHOUT CONTRAST     8/10/2020 6:45 am     TECHNIQUE:   CT of the cervical spine was performed without the administration of   intravenous contrast. Multiplanar reformatted images are provided for review. Dose modulation, iterative reconstruction, and/or weight based adjustment of   the mA/kV was utilized to reduce the radiation dose to as low as reasonably   achievable. COMPARISON:   Cervical spine CT 07/24/2020     HISTORY:   ORDERING SYSTEM PROVIDED HISTORY: fall   TECHNOLOGIST PROVIDED HISTORY:   Reason for exam:->fall   Reason for Exam: fall     FINDINGS:   BONES/ALIGNMENT:  Diffuse osseous demineralization.  No acute fracture. Straightening of cervical lordosis.  No spondylolisthesis.  Unchanged grade 1   retrolisthesis of C4 on C5 likely due to underlying degenerative changes. DEGENERATIVE CHANGES:  Severe degenerative changes of the anterior   atlantoaxial joint.  Moderate to severe degenerative disc disease at C4/C5   and C5/C6 with minimal to mild involvement at other levels.  Mild to moderate   facet arthropathy, more severe on the left. SOFT TISSUES:  Normal appearance of the prevertebral soft tissues. Heterogeneous thyroid containing nodules measuring up to approximately 1.5 cm.                       XR PELVIS (1-2 VIEWS) (Preliminary result)   Result time 08/10/20 06:55:50   Preliminary result by Jeannine Cooper MD (08/10/20 06:55:50)                 Impression:     Acute closed nondisplaced right intertrochanteric femur fracture.                       XR CHEST PORTABLE (Preliminary result)   Result time 08/10/20 06:54:39   Preliminary result by Jeannine Cooper MD (08/10/20 06:54:39)                 Impression:     No acute findings.                        [] Discussed with Radiologist:     [] The following radiograph was interpreted by myself in the absence of a radiologist:     EKG: (All EKG's are interpreted by myself in the absence of a cardiologist)  The Ekg interpreted by me shows  normal sinus rhythm with a rate of 63  Axis is   Left axis deviation  QTc is  normal  Intervals and Durations are unremarkable. ST Segments: no acute change  No significant change from prior EKG dated 7-        MDM:  Patient's vital signs are stable. Patient started on saline infusion and did give Zofran and low-dose fentanyl for pain. CT head and CT C-spine do not show any acute findings. Chest x-ray is clear. Pelvic x-ray does show an acute closed nondisplaced right intertrochanteric femur fracture. Patient resting comfortably. CBC shows a white count of 11.4. Hemoglobin of 7.5. Mild left shift. CMP shows a sodium of 131, potassium of 6.2. Creatinine of 1.2. Patient given insulin, D50, sodium bicarb, Kayexalate. call placed to ortho on call, spoke with Dr Renetta Thompson who will consult on patient. Awaiting UA and ammonia which have been ordered but are still pending, will admit to hospitalist for further eval and treatment. Repeat BMP at 130pm, K is now normal, updated nursing supervisor, can downgrade bed status as needed. Clinical Impression:  1. Fall, initial encounter    2. Dementia without behavioral disturbance, unspecified dementia type ([facility]    3. Hyperkalemia    4. Closed nondisplaced intertrochanteric fracture of right femur, initial encounter ([facility]        Disposition Vitals:  [unfilled], [unfilled], [unfilled], [unfilled]    Disposition referral (if applicable):  No follow-up provider specified.     Disposition medications (if applicable):  New Prescriptions    No medications on file         (Please note that portions of this note may have been completed with a voice recognition program. Efforts were made to edit the dictations but occasionally words are mis-transcribed.)    MD Consuelo Abernathy MD  08/10/20 Preet Clement MD  08/10/20 0711

## 2020-08-10 NOTE — ED NOTES
Bed: ED-16  Expected date:   Expected time:   Means of arrival:   Comments:  EMS      Gagan Mota RN  08/10/20 8451

## 2020-08-10 NOTE — ED TRIAGE NOTES
Pt comes from [facility] with reports of a fall that resulted in a right hip fracture. They received the xray results this morning. Pt is diagnosed with dementia and alzheimer's and comes from the locked dementia unit at the [facility].

## 2020-08-10 NOTE — PROGRESS NOTES
Called listed number [phone] and left message to call nurses station for consent and to have them let me know so I can call them back

## 2020-08-11 NOTE — CARE COORDINATION
CM met with pt and spouse for d/c planning. Pt has Dementia per spouse. Pt lives in [facility] and spouse plans for her to return when she is ready for d/c. Pt has a Fx hip and will need rehab. CM called and spoke with Danya. She states that pt will return to [facility] and will require a pre-cert. PT/OT and required COVID test ordered.  Charge nurse to send COVID test. Danya to start the pre-cert when the PT/OT notes are in.  TE

## 2020-08-11 NOTE — PROGRESS NOTES
Patient seen and examined  Demented and doesn't know where she is and does not make sense. She follows commands  Cannot slr on right  Will slr on left le  I called this  /POA again as he was not here and dont see other documentation   He said he got my message yesterday and called the floor back but I did not get that message  I explained the risks benefits of surgery and her current poor health and pain  We talked about nonoperative and operative treatment and at first he and his family were thinking not to have surgery but we talked about hospice and pain control and he didn't want her to be in pain or bedridden  I did tell him about periop death rates and one year death rates with broken hips and he has already been told by [facility] doctors that she has less than 6 months to live because of other comorbidities but he wants to proceed with repair and I gave him the time to call his other family members and then I called him back after he had a discussion with them. Site marked right hip    The patient POA was counseled at length about the risks of ren Covid-19 during their perioperative period and any recovery window from their procedure. The POA was made aware that ren Covid-19  may worsen their prognosis for recovering from their procedure  and lend to a higher morbidity and/or mortality risk. All material risks, benefits, and reasonable alternatives including postponing the procedure were discussed. The patient POA does wish to proceed with the procedure at this time.

## 2020-08-11 NOTE — PROGRESS NOTES
distress  Head/Eyes:  Normocephalic atraumatic, EOMI   NECK:   symmetrical, trachea midline  LUNGS: Normal Effort   CARDIOVASCULAR:  Normal rate  ABDOMEN:  non distended  MUSCULOSKELETAL:  ROM limited  NEUROLOGIC: Alert and Oriented,  Cranial nerves II-XII are grossly intact.    SKIN:  no bruising or bleeding, normal skin color,  no redness      Data:       CBC   Recent Labs     08/10/20  0609 08/11/20  0441   WBC 11.4* 9.5   HGB 10.5* 9.1*   HCT 32.6* 28.6*    131*      BMP   Recent Labs     08/10/20  1258 08/10/20  1547 08/11/20  0441    134* 136   K 4.9 5.0 4.2    105 107   CO2 24 20* 23   PHOS  --   --  4.0   BUN 25* 24* 23   CREATININE 1.1 1.1 1.0         Electronically signed by Stella Childs MD on 8/11/2020 at 1:08 PM

## 2020-08-11 NOTE — PROGRESS NOTES
Nephrology Progress Note  8/11/2020 10:04 AM        Subjective:   Admit Date: 8/10/2020  PCP: Stana Harada, PA-C    Interval History: more awake , alert and oriented   Did talk to me this am -     Diet:  NPO- for hips repair     ROS:  No overt confusion - Hip repair     Data:     Current meds:    donepezil  10 mg Oral Nightly    lactulose  30 g Oral Daily    magnesium oxide  400 mg Oral Daily    metoprolol tartrate  25 mg Oral BID    QUEtiapine  50 mg Oral Nightly    rifaximin  550 mg Oral BID    torsemide  20 mg Oral Daily    sodium chloride flush  10 mL Intravenous 2 times per day    enoxaparin  40 mg Subcutaneous Daily      dextrose           No intake/output data recorded. CBC:   Recent Labs     08/10/20  0609 08/11/20  0441   WBC 11.4* 9.5   HGB 10.5* 9.1*    131*          Recent Labs     08/10/20  1258 08/10/20  1457 08/10/20  1547 08/11/20  0441     --  134* 136   K 4.9  --  5.0 4.2     --  105 107   CO2 24  --  20* 23   BUN 25*  --  24* 23   CREATININE 1.1  --  1.1 1.0   GLUCOSE 96 96 105* 85       Lab Results   Component Value Date    CALCIUM 8.7 08/11/2020    PHOS 4.0 08/11/2020       Objective:     Vitals: /64   Pulse 65   Temp 97.5 °F (36.4 °C) (Oral)   Resp 15   Wt 79 lb 3.2 oz (35.9 kg)   SpO2 99%   BMI 14.49 kg/m²     General appearance:  Thin, cachectic no ac distress  HEENT:  + conj pallor  Neck:  Supple-- has Lt chest wall pacer   Lungs:  No gross crackles that I could appreciate   Heart:  seems RRr  Abdomen: soft - non tender ? Mild ascites  Extremities:  No overt edema   She  Has  no balbuena       Problem List :         Impression :     1. OREN- CKD stage 3 - stable   2. High K - better - some from OREN- MRA  3. Hip fx- repair today  4. underlying dementia/ Cirrhosis      Recommendation/Plan  :     1. So stop IVF  2. She is going for hip Fx rapid   3. So has risk for OREN  4. Watch closely   5.  Mainly fluid status and for OREN  6. labs in am   Follow clinically       Marilee Fernandez MD

## 2020-08-11 NOTE — DISCHARGE INSTR - COC
Continuity of Care Form    Patient Name: Yris Chang   :  1935  MRN:  [id number]    Admit date:  8/10/2020  Discharge date:  2020    Code Status Order: DNR-CCA   Advance Directives:   [facility] Documentation     Date/Time Healthcare Directive Type of Healthcare Directive Copy in [facility] [address] Agent's Name Healthcare Agent's Phone Number    20 1059  -- -- -- -- -- --          Admitting Physician:  Zari Rock MD  PCP: Jocy Lew PA-C    [facility]105[facility]A  Discharging Unit Phone Number: [phone]    Emergency Contact:   Extended Emergency Contact Information  Primary Emergency Contact: Julián [facility]  Address: [facility] [address], [address] [place] [address] Phone: [phone]  Relation: Spouse  Secondary Emergency Contact: Murali Ward Phone: [phone]  Relation: Grandchild    Past Surgical History:  Past Surgical History:   Procedure Laterality Date     SECTION      CHOLECYSTECTOMY      HYSTERECTOMY      PACEMAKER INSERTION Left 2013    PACEMAKER PLACEMENT      PARACENTESIS N/A 2020    IR    SKIN CANCER EXCISION         Immunization History:   Immunization History   Administered Date(s) Administered    Hepatitis A Adult (Havrix, Vaqta) 2020    Hepatitis B Ped/Adol (Engerix-B, Recombivax HB) 2020    Influenza 10/15/2013    Influenza Virus Vaccine 2014, 2015    Influenza, Quadv, IM, (6 mo and older Fluzone, Flulaval, Fluarix and 3 yrs and older Afluria) 2017    Influenza, Quadv, IM, PF (6 mo and older Fluzone, Flulaval, Fluarix, and 3 yrs and older Afluria) 2016, 10/18/2018, 10/04/2019    Pneumococcal Conjugate 13-valent (Cjuabdj62) 2015    Pneumococcal Polysaccharide (Iblnhypwl48) 2008    Tdap (Boostrix, Adacel) 2017       Active Problems:  Patient Active Problem List   Diagnosis Code    Cardiac pacemaker Z95.0    Hyperlipidemia with target LDL less than 100 E78.5    HTN (hypertension) I10    Pulmonary hypertension (HCC) I27.20    Recurrent major depressive disorder, in partial remission ([facility] [address] F33.41    Late onset Alzheimer's disease with behavioral disturbance (HCC) G30.1, F02.81    Age-related osteoporosis without current pathological fracture M81.0    Abnormal CT of the head R93.0    Meningioma (HCC) D32.9    Hallucinations due to late onset dementia (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    Other ascites R18.8    Anasarca R60.1    Cirrhosis of liver (HCC) K74.60    Hypoalbuminemia E88.09    Renal failure N19    Female bladder prolapse N81.10    Acute encephalopathy G93.40    Hyperkalemia E87.5    Closed displaced intertrochanteric fracture of right femur (HCC) S72.141A       Isolation/Infection:   Isolation          No Isolation        Patient Infection Status     Infection Onset Added Last Indicated Last Indicated By Review Planned Expiration Resolved Resolved By    None active    Resolved    COVID-19 Rule Out 08/11/20 08/11/20 08/11/20 Covid-19 Ambulatory (Ordered)   08/12/20 Rule-Out Test Resulted    COVID-19 Rule Out 07/25/20 07/25/20 07/25/20 Covid-19 Ambulatory (Ordered)   07/27/20 Rule-Out Test Resulted    COVID-19 Rule Out 05/12/20 05/12/20 05/12/20 Covid-19 Ambulatory (Ordered)   05/14/20 Rule-Out Test Resulted          Nurse Assessment:  Last Vital Signs: BP (!) 103/45   Pulse 63   Temp 98 °F (36.7 °C) (Axillary)   Resp 8   Wt 93 lb 9.6 oz (42.5 kg)   SpO2 98%   BMI 17.12 kg/m²     Last documented pain score (0-10 scale): Pain Level: 0  Last Weight:   Wt Readings from Last 1 Encounters:   08/13/20 93 lb 9.6 oz (42.5 kg)     Mental Status:  disoriented    IV Access:  - None    Nursing Mobility/ADLs:  Walking   Assisted  Transfer  Assisted  Bathing  Assisted  Dressing  Assisted  Toileting Assisted  Feeding  Assisted  Med Admin  Assisted  Med Delivery   whole    Wound Care Documentation and Therapy:        Elimination:  Continence:   · Bowel: No  · Bladder: No  Urinary Catheter: None   Colostomy/Ileostomy/Ileal Conduit: No       Date of Last BM: 8/14/2020    Intake/Output Summary (Last 24 hours) at 8/14/2020 1543  Last data filed at 8/13/2020 2313  Gross per 24 hour   Intake 10 ml   Output --   Net 10 ml     I/O last 3 completed shifts: In: 10 [I.V.:10]  Out: -     Safety Concerns: At Risk for Falls    Impairments/Disabilities:      None      Patient's personal belongings (please select all that are sent with patient):  None    RN SIGNATURE:  Electronically signed by Scott Barcenas RN on 8/14/20 at 3:18 PM EDT    CASE MANAGEMENT/SOCIAL WORK SECTION    Inpatient Status Date: ***    Readmission Risk Assessment Score:  Readmission Risk              Risk of Unplanned Readmission:        39           Discharging to Facility/ Agency   · Name: [id number]  · Address: [address] [place] [address]  · Phone: [phone]  · Fax: [phone]    Dialysis Facility (if applicable)   · Name:  · Address:  · Dialysis Schedule:  · Phone:  · Fax    PHYSICIAN SECTION    Nutrition Therapy:  Current Nutrition Therapy:   - Oral Diet:  General    Routes of Feeding: Oral  Liquids: No Restrictions  Daily Fluid Restriction: no  Last Modified Barium Swallow with Video (Video Swallowing Test): not done    Treatments at the Time of Hospital Discharge:   Respiratory Treatments: none  Oxygen Therapy:  is not on home oxygen therapy. Ventilator:    - No ventilator support    Rehab Therapies: Physical Therapy and Occupational Therapy  Weight Bearing Status/Restrictions: WBAT      Prognosis: Fair    Condition at Discharge: Stable    Rehab Potential (if transferring to Rehab): Fair    Recommended Labs or Other Treatments After Discharge: STAPLES OUT IN 10 TO 14 DAYS, FOLLOW UP IN OFFICE IN 6 TO 8 WEEKS with Dr Gissel Farfan. .    Bmp, MG and phos in 2 week, f/u with Dr Maximo Joseph Nephrology. Physician Certification: I certify the above information and transfer of Leeanne Hewitt  is necessary for the continuing treatment of the diagnosis listed and that she requires [facility] for greater 30 days.      Update Admission H&P: No change in H&P    PHYSICIAN SIGNATURE:  Electronically signed by Leisa Rapp MD on 8/13/20 at 10:20 AM EDT

## 2020-08-11 NOTE — CONSULTS
RENAL DOSE ADJUSTMENT MADE PER P/T PROTOCOL    PREVIOUS ORDER:  Lovenox 40mg subq daily    CrCl cannot be calculated (Unknown ideal weight. ). Recent Labs     08/10/20  1258 08/10/20  1547 08/11/20  0441   BUN 25* 24* 23   CREATININE 1.1 1.1 1.0   PLT  --   --  131*     NEW RENALLY ADJUSTED ORDER:  LOVENOX 30MG SUBQ DAILY (PT ONLY WEIGHS 35. 9KG)    [facility] CollinsSan [facility]  8/11/2020 1:46 PM  __________________________________________________

## 2020-08-11 NOTE — ANESTHESIA PRE PROCEDURE
Department of Anesthesiology  Preprocedure Note       Name:  Robin Gamboa   Age:  80 y.o.  :  1935                                          MRN:  [id number]         Date:  2020      Surgeon: Olesya Goins):  Elena Chang MD    Procedure: Procedure(s):  RIGHT FEMUR IM NAIL CORINE INSERTION    Medications prior to admission:   Prior to Admission medications    Medication Sig Start Date End Date Taking?  Authorizing Provider   metoprolol tartrate (LOPRESSOR) 25 MG tablet Take 1 tablet by mouth 2 times daily 20   Freya Wilson MD   spironolactone (ALDACTONE) 25 MG tablet Take 1 tablet by mouth daily 20   Freya Wilson MD   torsemide (DEMADEX) 20 MG tablet Take 1 tablet by mouth daily 20   Freya Wilson, MD   rifaximin (XIFAXAN) 550 MG tablet Take 1 tablet by mouth 2 times daily 20   Freya Wilson MD   Multiple Vitamins-Minerals (MULTIVITAMIN ADULTS PO) Take 1 tablet by mouth daily    Historical Provider, MD   Omega-3 Fatty Acids (FISH OIL PO) Take 1,000 mg by mouth daily as needed    Historical Provider, MD   lactulose (CHRONULAC) 10 GM/15ML solution Take 30 g by mouth daily    Historical Provider, MD   potassium chloride (KLOR-CON M) 20 MEQ extended release tablet Take 2 tablets by mouth 2 times daily for 5 days 20  Pato Brunson MD   magnesium oxide (MAG-OX) 400 (240 Mg) MG tablet Take 1 tablet by mouth daily  Patient taking differently: Take 400 mg by mouth 2 times daily  20   Pato Brunson MD   UNABLE TO FIND Shower rail 20   Bk Basurto PA-C   UNABLE TO FIND Shower chair/stool 20   Bk Basurto PA-C   QUEtiapine (SEROQUEL) 50 MG tablet Take 1 tablet by mouth nightly 20   Bk Basurto PA-C   donepezil (ARICEPT) 10 MG tablet Take 1 tablet by mouth nightly 20   Bk Basurto PA-C       Current medications:    Current Facility-Administered Medications   Medication Dose Route Frequency Provider Last Rate Last Dose    0.9 % sodium chloride infusion   Intravenous Continuous Reese Blankenship  mL/hr at 08/10/20 1654      glucose (GLUTOSE) 40 % oral gel 15 g  15 g Oral PRN Reese Blankenship MD        dextrose 50 % IV solution  12.5 g Intravenous PRN Reese Blankenship MD   12.5 g at 08/10/20 1150    glucagon (rDNA) injection 1 mg  1 mg Intramuscular PRN Reese Blankenship MD        dextrose 5 % solution  100 mL/hr Intravenous PRN Reese Blankenship MD        donepezil (ARICEPT) tablet 10 mg  10 mg Oral Nightly Meg Cuevas MD   10 mg at 08/10/20 2056    lactulose (CHRONULAC) 10 GM/15ML solution 30 g  30 g Oral Daily Meg Cuevas MD   Stopped at 08/10/20 1551    magnesium oxide (MAG-OX) tablet 400 mg  400 mg Oral Daily Meg Cuevas MD   Stopped at 08/10/20 1552    metoprolol tartrate (LOPRESSOR) tablet 25 mg  25 mg Oral BID Meg Cuevas MD   25 mg at 08/10/20 2057    QUEtiapine (SEROQUEL) tablet 50 mg  50 mg Oral Nightly Meg Cuevas MD   50 mg at 08/10/20 2056    rifaximin (XIFAXAN) tablet 550 mg  550 mg Oral BID Meg Cuevas MD   550 mg at 08/10/20 2057    torsemide (DEMADEX) tablet 20 mg  20 mg Oral Daily Meg Cuevas MD   Stopped at 08/10/20 1552    sodium chloride flush 0.9 % injection 10 mL  10 mL Intravenous 2 times per day Meg Cuevas MD        sodium chloride flush 0.9 % injection 10 mL  10 mL Intravenous PRN Meg Cuevas MD        acetaminophen (TYLENOL) tablet 650 mg  650 mg Oral Q6H PRN Meg Cuevas MD   650 mg at 08/10/20 2143    Or    acetaminophen (TYLENOL) suppository 650 mg  650 mg Rectal Q6H PRN Meg Cuevas MD        polyethylene glycol (GLYCOLAX) packet 17 g  17 g Oral Daily PRN Meg Cuevas MD        promethazine (PHENERGAN) tablet 12.5 mg  12.5 mg Oral Q6H PRN Meg Cuevas MD        Or    ondansetron (ZOFRAN) injection 4 mg  4 mg Intravenous Q6H PRN Meg Cuevas MD        enoxaparin (LOVENOX) injection 40 mg  40 mg Subcutaneous Daily Kennth Cheadle, MD   40 mg at 08/10/20 1549    morphine sulfate (PF) injection 2 mg  2 mg Intravenous Q2H PRN Kennth Cheadle, MD        Or   Oviido Flood morphine sulfate (PF) injection 4 mg  4 mg Intravenous Q2H PRN Kennth Cheadle, MD           Allergies:  No Known Allergies    Problem List:    Patient Active Problem List   Diagnosis Code    Cardiac pacemaker Z95.0    Hyperlipidemia with target LDL less than 100 E78.5    HTN (hypertension) I10    Pulmonary hypertension ([facility] [address] I27.20    Recurrent major depressive disorder, in partial remission ([facility] [address] F33.41    Late onset Alzheimer's disease with behavioral disturbance ([facility] [address] G30.1, F02.81    Age-related osteoporosis without current pathological fracture M81.0    Abnormal CT of the head R93.0    Meningioma (HCC) D32.9    Hallucinations due to late onset dementia ([facility] [address] F03.90, R44.3    Aneurysm of splenic artery ([facility] [address] I72.8    Other ascites R18.8    Anasarca R60.1    Cirrhosis of liver ([facility] [address] K74.60    Hypoalbuminemia E88.09    Renal failure N19    Female bladder prolapse N81.10    Acute encephalopathy G93.40    Hyperkalemia E87.5       Past Medical History:        Diagnosis Date    Arthritis     Dementia ([facility] [address]     Hyperlipidemia     Hypertension     MDRO (multiple drug resistant organisms) resistance     MRSA? Past Surgical History:        Procedure Laterality Date     SECTION      CHOLECYSTECTOMY      HYSTERECTOMY      PACEMAKER INSERTION Left 2013    PACEMAKER PLACEMENT      PARACENTESIS N/A 2020    IR    SKIN CANCER EXCISION         Social History:    Social History     Tobacco Use    Smoking status: Never Smoker    Smokeless tobacco: Never Used   Substance Use Topics    Alcohol use:  No                                Counseling given: Not Answered      Vital Signs (Current):   Vitals:    08/10/20 1946 08/10/20 2057 08/10/20 2350 20 0420   BP: (!) 146/99 (!) 140/81 (!) 116/47 129/64   Pulse: 93 100 86 65 Resp: 11 20 18 14   Temp: 36.6 °C (97.8 °F) 36.6 °C (97.9 °F) 36.4 °C (97.5 °F) 36.4 °C (97.5 °F)   TempSrc: Oral Oral Oral Oral   SpO2:   98%    Weight: 79 lb 3.2 oz (35.9 kg)                                                 BP Readings from Last 3 Encounters:   08/11/20 129/64   07/30/20 132/64   07/25/20 (!) 150/108       NPO Status:                                                                                 BMI:   Wt Readings from Last 3 Encounters:   08/10/20 79 lb 3.2 oz (35.9 kg)   07/30/20 76 lb 6.4 oz (34.7 kg)   07/24/20 102 lb (46.3 kg)     Body mass index is 14.49 kg/m².     CBC:   Lab Results   Component Value Date    WBC 9.5 08/11/2020    RBC 2.96 08/11/2020    HGB 9.1 08/11/2020    HCT 28.6 08/11/2020    MCV 96.6 08/11/2020    RDW 15.9 08/11/2020     08/11/2020       CMP:   Lab Results   Component Value Date     08/11/2020    K 4.2 08/11/2020     08/11/2020    CO2 23 08/11/2020    BUN 23 08/11/2020    CREATININE 1.0 08/11/2020    GFRAA >60 08/11/2020    AGRATIO 0.5 05/21/2020    LABGLOM 53 08/11/2020    GLUCOSE 85 08/11/2020    PROT 8.0 08/10/2020    CALCIUM 8.7 08/11/2020    BILITOT 0.5 08/10/2020    ALKPHOS 124 08/10/2020    AST 63 08/10/2020    ALT 38 08/10/2020       POC Tests:   Recent Labs     08/10/20  2211   POCGLU 93       Coags:   Lab Results   Component Value Date    PROTIME 16.9 07/27/2020    INR 1.39 07/27/2020    APTT 41.4 07/27/2020       HCG (If Applicable): No results found for: PREGTESTUR, PREGSERUM, HCG, HCGQUANT     ABGs: No results found for: PHART, PO2ART, ROY6IGJ, GTB6NSQ, BEART, V0QITKCD     Type & Screen (If Applicable):  No results found for: LABABO, LABRH    Drug/Infectious Status (If Applicable):  Lab Results   Component Value Date    HEPCAB NON REACTIVE 05/11/2020       COVID-19 Screening (If Applicable):   Lab Results   Component Value Date    COVID19 NOT DETECTED 07/25/2020         Anesthesia Evaluation  Patient summary reviewed and Nursing notes

## 2020-08-11 NOTE — BRIEF OP NOTE
Brief Postoperative Note      Patient: Chi Bailey  YOB: 1935  MRN: [id number]    Date of Procedure: 8/11/2020    Pre-Op Diagnosis: RIGHT HIP FRACTURE  intertrochanteric  Post-Op Diagnosis: Same       Procedure(s):  RIGHT FEMUR IM NAIL DERRICK INSERTION    Surgeon(s):  Amanda Reddy MD    Assistant:  * No surgical staff found *    Anesthesia: General    Estimated Blood Loss (mL): less than 626     Complications: None    Specimens:   * No specimens in log *    Implants:  Implant Name Type Inv.  Item Serial No.  Lot No. LRB No. Used Action   10MM/125 DEG TI LATOYA TFNA 320MM/RIGHT     [employer]- [id number] Right 1 Implanted   IMPL BLADE HELICAL FENEST TFNA 51BV ST Screw/Plate/Nail/Derrick IMPL BLADE HELICAL FENEST TFNA 61YJ ST  [employer] [id number] Right 1 Implanted   SCREW LK W/ T25 STARDRIVE [id number] Screw/Plate/Nail/Derrick SCREW LK W/ T25 STARDRIVE [id number]  [employer] [id number] Right 1 Implanted         Drains: * No LDAs found *    Findings: fracture    Electronically signed by Amanda Reddy MD on 8/11/2020 at 10:31 AM

## 2020-08-12 PROBLEM — S72.141A CLOSED DISPLACED INTERTROCHANTERIC FRACTURE OF RIGHT FEMUR (HCC): Status: ACTIVE | Noted: 2020-01-01

## 2020-08-12 NOTE — PROGRESS NOTES
Physical Therapy  [facility] ACUTE CARE PHYSICAL THERAPY EVALUATION  Leeanne Hewitt, 1935, 3105/3105-A, 2020    History  [place]:  The primary encounter diagnosis was Fall, initial encounter. Diagnoses of Dementia without behavioral disturbance, unspecified dementia type ([address] Hyperkalemia, and Closed nondisplaced intertrochanteric fracture of right femur, initial encounter [facility]) were also pertinent to this visit. Patient  has a past medical history of Arthritis, Dementia ([address] Hyperlipidemia, Hypertension, and MDRO (multiple drug resistant organisms) resistance. Patient  has a past surgical history that includes Hysterectomy; Cholecystectomy;  section; Skin cancer excision; Pacemaker insertion (Left, 2013); Paracentesis (N/A, 2020); and pacemaker placement. Subjective:  Patient states: \"There are so many clothes in here\"   Pain:  Denies pain upon arrival though appeared to have discomfort with WB activity to right LE. Did not quantify. Communication with other providers: Co-eval with George MAIER. Restrictions: general precautions, falls, WBAT RLE, , chair alarm, portable tele     Home Setup/Prior level of function  Pt is a poor historian 2* dementia. Unable to gather accurate social functional history from her. Per charting, pt was admitted from [facility] home (locked dementia unit). Examination of body systems (includes body structures/functions, activity/participation limitations):  · Observation:  Supine in bed upon arrival. Agreeable to work with therapy. Very difficult to keep on track. Dec comprehension and carry over 2* cognitive deficits. · Vision:  Appeared intact   · Hearing:  [email or web address]   · Cardiopulmonary:  Stable vitals on room air throughout session. · Cognition: Hx of dementia. Oriented to person only. See OT/SLP note for further evaluation.     Musculoskeletal  · ROM R/L:  [employer] [place] BLEs   · Strength R/L:  RLE grossly 4-/5, LLE 4+/5, dec strength observed in function and endurance. Mobility/treatment:   · Rolling L/R:  NT   · Supine to sit:  maxA for bilat LE advancement, hip scooting, and uprighting trunk. Facilitated UE to bed rail for support. Constant cues for sequencing. Dec carry over, comprehension and task initiation. · Transfers:    · Sit to stand: Leatha from EOB for steadying and VCs for sequencing UEs. Poor carry over. · Stand to sit: modA to recliner. Impulsively sat in chair needing a significant amount of VCs for stepping fully back to chair prior to initiating sit. · Step pivot: Leatha to modA for balance and device management. Poor sequencing and use of RW.   · Sitting balance:  SBA at EOB static and light dynamic without UE support   · Standing balance: Leatha at RW   · Gait: ~20ft with RW Leatha for steadying with a significant amount of assist needed for device management. Very poor awareness to surroundings and unsafe with walker. Very fwd posture with walker extended far in front. Antalgic and shuffled gait. Overall slow pace and dec activity tolerance. · Educated pt on POC, role of PT, DME. Cues for sequencing and UE/LE placement to inc safety and indep with mobility,     [facility] 6 Clicks Inpatient Mobility:  AM-PAC Inpatient Mobility Raw Score : 15    Safety: patient left in chair with alarm and , call light within reach, gait belt used. Assessment:  Decreased functional mobility ; Decreased strength; Decreased endurance; Decreased safe awareness; Increased pain; Decreased balance; Decreased cognition  Pt is an 80year old female admitted with a recent fall sustaining a right hip fx. Pt is s/p right ORIF 8/11. Recommend subacute rehab once medically stable. Baseline function unclear due to pts significant dementia. Pt is currently requiring up to maxA and is limited with overall tolerance to activity. She would benefit from continued therapy to address her current deficits, dec potential fall risk, and restore function. Complexity: Moderate  Prognosis: Good, no significant barriers to participation at this time.    Plan Times per week: 6+/week   Discharge Recommendations: Subacute/Skilled Nursing Facility  Equipment: continue to assess     Goals:  Short term goals  Time Frame for Short term goals: 1 week  Short term goal 1: Pt will perform sit><supine Leatha  Short term goal 2: Pt will transfer sit><stand CGA  Short term goal 3: Pt will transfer between surfaces with RW CGA  Short term goal 4: Pt will ambulate 75ft with RW CGA  Short term goal 5: Pt will complete LE HEP x 10 reps SBA       Treatment plan:  Bed mobility, transfers, balance, gait, TA, TX, EX    Recommendations for NURSING mobility: ambulate to bathroom with RW     Time:   Time in: 0938  Time out:1003  Timed treatment minutes: 15  Total time: 25    Electronically signed by:    Aron Domínguez [id number]  8/12/2020, 11:54 AM

## 2020-08-12 NOTE — PROGRESS NOTES
Nephrology Progress Note  8/12/2020 1:10 PM        Subjective:   Admit Date: 8/10/2020  PCP: Denver Dhillon PA-C    Interval History: little confused  This  am     Diet: wants breakfast - she feels hungry     ROS:  No sob, no balbuena     Data:     Current meds:    0.9 % sodium chloride  250 mL Intravenous Once    aspirin  81 mg Oral Daily    enoxaparin  30 mg Subcutaneous Daily    donepezil  10 mg Oral Nightly    lactulose  30 g Oral Daily    magnesium oxide  400 mg Oral Daily    QUEtiapine  50 mg Oral Nightly    rifaximin  550 mg Oral BID    sodium chloride flush  10 mL Intravenous 2 times per day      dextrose           I/O last 3 completed shifts: In: 910 [I.V.:910]  Out: 100 [Blood:100]    CBC:   Recent Labs     08/10/20  0609 08/11/20  0441 08/12/20  0524   WBC 11.4* 9.5 11.9*   HGB 10.5* 9.1* 6.8*    131* 125*          Recent Labs     08/10/20  1547 08/11/20  0441 08/12/20  0410   * 136 137   K 5.0 4.2 4.8    107 106   CO2 20* 23 21   BUN 24* 23 32*   CREATININE 1.1 1.0 1.5*   GLUCOSE 105* 85 89       Lab Results   Component Value Date    CALCIUM 8.5 08/12/2020    PHOS 4.0 08/11/2020       Objective:     Vitals: BP (!) 101/33   Pulse 73   Temp 98.6 °F (37 °C) (Oral)   Resp 20   Wt 80 lb 3.2 oz (36.4 kg)   SpO2 100%   BMI 14.67 kg/m²     General appearance:  As distress  HEENT:  ++ conj pallor  Neck:  supple  Lungs:  No gross crackles  Heart:  Seems RRR  Abdomen: soft , soft , tender   Extremities:  No edema yet       Problem List :         Impression :     1. OREN- CKD stage 3- unknown UOP- cr up- so d/d - ac bladder obstruction / low blood volume / hypotension or any tubular toxicity  from med's - also as she has hip Fx that can have tubular injury by inherent nature of the procedure   2. K is ok now   3. Hip Fx S/p repair / also underlying cirrhosis   4. Multifactorial anemia     Recommendation/Plan  :     1. So start  with bladder scan  2. Redo UA   3.  She likely will get transfusion  4. 'she is on LMWH so R/O any occult bleeding;follow clinically and bio  Chemically        Jaja Cadet MD

## 2020-08-12 NOTE — PROGRESS NOTES
Occupational Therapy    [facility] ACUTE CARE OCCUPATIONAL THERAPY EVALUATION    Rafa Chowdary, 1935, 3105/3105-A, 8/12/2020    Discharge Recommendation: Noel Merritt      History:  [place]:  The primary encounter diagnosis was Fall, initial encounter. Diagnoses of Dementia without behavioral disturbance, unspecified dementia type ([address] Hyperkalemia, and Closed nondisplaced intertrochanteric fracture of right femur, initial encounter [facility]) were also pertinent to this visit. Subjective:  Patient states: \"This feels so fresh!\" (pt stated after competing oral hygiene routine)  Pain: Pt denied pain this date  Communication with other providers: PT Anuja Bill, RN Margy Frost  Restrictions: General Precautions, Fall Risk, WBAT Rt LE, Telemetry, Pulse Ox, BP cuff, Bed/chair alarm    Home Setup/Prior level of function:  Per case management charting, pt lives at [address] [place] [address] dementia [facility]. Anticipate ADL assist, but pt ambulatory with walker at baseline. Examination:  · Observation: Supine in bed upon arrival. Pleasantly confused and agreeable to evaluation.   · [facility]: [employer] [facility]  · Hearing: WFL  · Vitals: Stable vitals throughout session    Body Systems and functions:  · ROM: WFL all joints in BL UEs  · Strength: 4/5 MMT all major muscle groups BL UEs (sufficient UE strength for pt's age)  · Sensation: WFL (denies numbness/tingling)  · Tone: Normal  · Coordination: WFL with basic ADL tasks    Activities of Daily Living (ADLs):  · Feeding: Supervision/setup (anticipate setup for opening packages/containers; able to drink cup of water without assistance)  · Grooming: Supervision/setup (completed oral hygiene tasks of brushing/rinsing at chair level; setup for applying toothpaste and min cues for termination of task)  · UB bathing: Min A  · LB bathing: Max A  · UB dressing: Dependent (donning clean hospital gown)  · LB dressing: Dependent (donning BL socks)  · Toileting: Dependent (anticipate total assist for clothing mgmt and marisa care at this time)    Cognitive and Psychosocial Functioning:  · Overall cognitive status: Impaired (history of dementia; follows grossly 2/3 of basic one step commands and engages in purposeful tasks, inconsistent appropriate responses/communication, poor memory, poor comprehension of information, poor overall insight/safety awareness)   · Affect: Normal     Balance:   · Sitting: SBA in unsupported sitting EOB  · Standing: CGA with RW    Functional Mobility:  · Bed Mobility: Max A supine to sitting EOB (HOB elevated to 20', increased time/effort and max coaching required)  · Transfers: Min A to and from bed and recliner (min cues for safe hand placement each direction)  · Ambulation: Min A with RW 20 ft; unsteady gait, extremely poor mgmt of RW/max safety cues required      AM-PAC 6 click short form for inpatient daily activity:   How much help from another person does the patient currently need. .. Unable  Dep A Lot  Max A A Lot   Mod A A Little  Min A A Little   CGA  SBA None   Mod I  Indep  Sup   1. Putting on and taking off regular lower body clothing? [x] 1    [] 2   [] 2   [] 3   [] 3   [] 4      2. Bathing (including washing, rinsing, drying)? [] 1   [x] 2   [] 2 [] 3 [] 3 [] 4   3. Toileting, which includes using toilet, bedpan, or urinal? [x] 1    [] 2   [] 2   [] 3   [] 3   [] 4     4. Putting on and taking off regular upper body clothing? [x] 1   [] 2   [] 2   [] 3   [] 3    [] 4      5. Taking care of personal grooming such as brushing teeth? [] 1   [] 2    [] 2 [] 3    [x] 3   [] 4      6. Eating meals? [] 1   [] 2   [] 2   [] 3   [x] 3   [] 4      Raw Score:  11     [24=0% impaired(CH), 23=1-19%(CI), 20-22=20-39%(CJ), 15-19=40-59%(CK), 10-14=60-79%(CL), 7-9=80-99%(CM), 6=100%(CN)]     Treatment:  Self Care Training:   Cues were given for safety, sequence, UE/LE placement, visual cues, and balance.     Activities performed today included UB/LB dressing tasks, oral hygiene routine, self-feeding      Safety Measures: Gait belt used, Left in Chair, Alarm in place    Assessment:  Pt is an 80year old female with a past medical history of Arthritis, Dementia ([address] Hyperlipidemia, Hypertension, and MDRO (multiple drug resistant organisms) resistance. Pt admitted following a fall and diagnosed with a Rt femur fracture. Pt underwent IM nail on 8-11. Pt is from long term care dementia unit at [facility], but is ambulatory with a walker at baseline. Pt currently presents with the above impairments, and will benefit from continued OT services in SNF at discharge. Complexity: Moderate  Prognosis: Fair  Plan: 3x/week      Goals:  1. Pt will complete all aspects of bed mobility for EOB/OOB ADLs min A with cues  2. Pt will complete UB/LB bathing mod A with setup using long handled sponge PRN  3. Pt will complete all aspects of LB dressing mod A with setup using AE PRN  4. Pt will complete all functional transfers to and from bed, chair, toilet, shower chair CGA/min cues  5. Pt will ambulate HH distance to bathroom for toileting CGA with RW  6. Pt will complete all aspects of toileting task mod A  7. Pt will complete oral hygiene/grooming routine in standing at sink min A with setup/cues  8.  Pt will complete ther ex/ther act with focus on functional standing tolerance >5 minutes and cognitive re-orientation      Time:   Time in: 938  Time out: 1002  Timed treatment minutes: 9  Total time: 24      Electronically signed by:    MARVA Smith/L, [address] [place] [address], [id number]

## 2020-08-12 NOTE — PROGRESS NOTES
Hospitalist Progress Note      Name:  Francisco Gutierrez /Age/Sex: 1935  (80 y.o. female)   MRN & CSN:  [id number] & [id number] Admission Date/Time: 8/10/2020  4:49 AM   Location:  [facility]- PCP: June Jerome PA-C       Francisco Gutierrez is a 80 y.o.  female  who presents with Fall      Assessment and Plan:   Right Femur Fx  Fall  - s/p Right Femur IM Nail Derrick Insertion   - imaging reviewed  - pain mx prn  - pt/ot  - ortho consulted, keep on low dose asa, no lovenox    Acute Anemia  - transufse 1 unit PRBC  - check iron panel, b12 and folate  - no signs of gross blood loss     HyperK  - resolved  - given kayexalate, insulin and bicarb in ED, will give ca gluconate  - hold K supplement and aldactone     OREN on CKD  - worsened   - nephro following    Hypotension  - re-check BP as unsure if accurate   - d/c lopressor and torsemide for now  - monitor closely     Hypothermic  - improved from yesterday with warming blankets, clinically looks and feels ok she states  - monitor    COVID 19 pending for placement     Dementia  HTN  HLD            Diet DIET GENERAL;   Code Status DNR-CCA     Medications:   Medications:    0.9 % sodium chloride  250 mL Intravenous Once    ceFAZolin  1 g Intravenous Q8H    aspirin  81 mg Oral Daily    enoxaparin  30 mg Subcutaneous Daily    donepezil  10 mg Oral Nightly    lactulose  30 g Oral Daily    magnesium oxide  400 mg Oral Daily    metoprolol tartrate  25 mg Oral BID    QUEtiapine  50 mg Oral Nightly    rifaximin  550 mg Oral BID    torsemide  20 mg Oral Daily    sodium chloride flush  10 mL Intravenous 2 times per day      Infusions:    dextrose       PRN Meds: oxyCODONE-acetaminophen, 1 tablet, Q6H PRN  glucose, 15 g, PRN  dextrose, 12.5 g, PRN  glucagon (rDNA), 1 mg, PRN  dextrose, 100 mL/hr, PRN  sodium chloride flush, 10 mL, PRN  acetaminophen, 650 mg, Q6H PRN    Or  acetaminophen, 650 mg, Q6H PRN  polyethylene glycol, 17 g, Daily PRN  promethazine, 12.5 mg, Q6H PRN    Or  ondansetron, 4 mg, Q6H PRN  morphine, 2 mg, Q2H PRN    Or  morphine, 4 mg, Q2H PRN      Subjective:   Doing ok, no distress    Objective: Intake/Output Summary (Last 24 hours) at 8/12/2020 1128  Last data filed at 8/12/2020 5775  Gross per 24 hour   Intake 110 ml   Output 150 ml   Net -40 ml      Vitals:   Vitals:    08/12/20 0855   BP:    Pulse:    Resp:    Temp:    SpO2: 100%     Physical Exam:   Gen:  awake, alert, no apparent distress  Head/Eyes:  Normocephalic atraumatic, EOMI   NECK:   symmetrical, trachea midline  LUNGS: Normal Effort   CARDIOVASCULAR:  Normal rate  ABDOMEN:  non distended  MUSCULOSKELETAL:  ROM limited  NEUROLOGIC: Alert and Oriented,  Cranial nerves II-XII are grossly intact.    SKIN:  no bruising or bleeding, normal skin color,  no redness      Data:       CBC   Recent Labs     08/10/20  0609 08/11/20  0441 08/12/20  0524   WBC 11.4* 9.5 11.9*   HGB 10.5* 9.1* 6.8*   HCT 32.6* 28.6* 20.9*    131* 125*      BMP   Recent Labs     08/10/20  1547 08/11/20  0441 08/12/20  0410   * 136 137   K 5.0 4.2 4.8    107 106   CO2 20* 23 21   PHOS  --  4.0  --    BUN 24* 23 32*   CREATININE 1.1 1.0 1.5*         Electronically signed by Zari Rock MD on 8/12/2020 at 11:28 AM

## 2020-08-12 NOTE — OP NOTE
IMPLANT PLACEMENT. THE WOUNDS WERE IRRIGATED. THE FASCIA WAS CLOSED WITH 0 VICRYL SUTURE AND THE SUBQ WAS CLOSED WITH 2-0 VICRYL SUTURE AND THE SKIN WAS STAPLED SHUT. THE PATIENT WAS PLACED IN A STERILE BANDAGE AND TRANSPORTED TO THE BED AND RECOVERY ROOM.       Electronically signed by Theodore Cuevas MD on 8/11/2020 at 9:17 PM

## 2020-08-13 NOTE — CARE COORDINATION
Pre-cert still pending for [facility] per Gita[facility]Caro. [facility] to notify CM when pre-cert is received.   TE

## 2020-08-13 NOTE — PROGRESS NOTES
Hospitalist Progress Note      Name:  Kin Hardin /Age/Sex: 1935  (80 y.o. female)   MRN & CSN:  [id number] & [id number] Admission Date/Time: 8/10/2020  4:49 AM   Location:  [address] [place] [address]-A PCP: Fozia Villalobos PA-C       Kin Hardin is a 80 y.o.  female  who presents with Fall      Assessment and Plan:   Right Femur Fx  Fall  - s/p Right Femur IM Nail Derrick Insertion   - imaging reviewed  - pain mx prn  - pt/ot  - ortho consulted, keep on low dose asa, no lovenox     Acute Anemia  - stable  - transfuse 1 unit PRBC  - check iron panel mild low  - b12 folate WNL  - no signs of gross blood loss     HyperK  - resolved  - given kayexalate, insulin and bicarb in ED, will give ca gluconate  - hold K supplement and aldactone     OREN on CKD  - worsening  - nephro following     Hypotension  - stable  - d/c lopressor and torsemide for now  - monitor closely      Hypothermic  - resolved  - monitor     COVID 19 negative    Placement pending     Dementia  HTN  HLD            Diet DIET GENERAL;   Code Status DNR-CCA     Medications:   Medications:    magnesium oxide  400 mg Oral Daily    0.9 % sodium chloride  250 mL Intravenous Once    aspirin  81 mg Oral Daily    donepezil  10 mg Oral Nightly    lactulose  30 g Oral Daily    QUEtiapine  50 mg Oral Nightly    rifaximin  550 mg Oral BID    sodium chloride flush  10 mL Intravenous 2 times per day      Infusions:    dextrose       PRN Meds: oxyCODONE-acetaminophen, 1 tablet, Q6H PRN  glucose, 15 g, PRN  dextrose, 12.5 g, PRN  glucagon (rDNA), 1 mg, PRN  dextrose, 100 mL/hr, PRN  sodium chloride flush, 10 mL, PRN  acetaminophen, 650 mg, Q6H PRN    Or  acetaminophen, 650 mg, Q6H PRN  polyethylene glycol, 17 g, Daily PRN  promethazine, 12.5 mg, Q6H PRN    Or  ondansetron, 4 mg, Q6H PRN  morphine, 2 mg, Q2H PRN    Or  morphine, 4 mg, Q2H PRN      Subjective:     Npo distress  Objective:        Intake/Output Summary (Last 24 hours) at 2020 1015  Last data filed at 8/13/2020 0717  Gross per 24 hour   Intake 376.67 ml   Output 200 ml   Net 176.67 ml      Vitals:   Vitals:    08/13/20 0847   BP: (!) 101/45   Pulse: 89   Resp: 14   Temp: 96.3 °F (35.7 °C)   SpO2: 98%     Physical Exam:   Gen:  awake, alert, no apparent distress  Head/Eyes:  Normocephalic atraumatic, EOMI   NECK:   symmetrical, trachea midline  LUNGS: Normal Effort   CARDIOVASCULAR:  Normal rate  ABDOMEN:  non distended  MUSCULOSKELETAL:  ROM limited  NEUROLOGIC: Alert and Oriented,  Cranial nerves II-XII are grossly intact.    SKIN:  no bruising or bleeding, normal skin color,  no redness      Data:       CBC   Recent Labs     08/11/20 0441 08/12/20 0524 08/12/20 1958 08/13/20 0459   WBC 9.5 11.9*  --  13.5*   HGB 9.1* 6.8* 9.2* 9.1*   HCT 28.6* 20.9* 31.2* 27.5*   * 125*  --  132*      BMP   Recent Labs     08/11/20 0441 08/12/20 0410 08/13/20 0459    137 131*   K 4.2 4.8 4.8    106 99   CO2 23 21 24   PHOS 4.0  --   --    BUN 23 32* 41*   CREATININE 1.0 1.5* 1.7*         Electronically signed by Brad Harrington MD on 8/13/2020 at 10:15 AM

## 2020-08-13 NOTE — PROGRESS NOTES
toileting, hand hygiene. Toilet Transfer: Min A x2 c RW + sequential cues for safe body positoning / sequencing from bedside chair to [facility] placed at right anle to chair. Toileting: Dep for hygiene following multiple episodes of bowel incontinenc c standing. UB Dressing: Dep to doff / don gown, unable to follow cues w/o touching assist to thread BUE and adjust gown. LB Dressing: Dep for donning pull up and  socks. Grooming: SBA + cues for hand hygiene. All therapeutic intervention performed c emphasis on cognitive re-orientation, dynamic balance / standing tolerance to inc strength, endurance and act tolerance for inc Indep c ADL tasks, func transfers / mobility. Safety  Patient safely in bedside chair + alarm placed at end of session, with call light/phone in reach, and nursing aware. Gait belt was used for func transfers / mobility. Telesitter on. Tray table placed across in front of pt's chair per nurse's request.      Assessment / Impression:        Patient's tolerance of treatment:  Well   Adverse Reaction: None  Significant change in status and impact:  None  Barriers to improvement:  Pain, dementia, decreased cognition for sequencing, safety    Plan for Next Session:    Continue per OT POC to continue to address functional transfers / mobility during ADLs. Time in:  1332  Time out:  1414  Timed treatment minutes:  42  Total treatment time:  42    Electronically signed by:    LISA Kong  8/13/2020, 1:38 PM    Previously filed values:       Goals:  1. Pt will complete all aspects of bed mobility for EOB/OOB ADLs min A with cues  2. Pt will complete UB/LB bathing mod A with setup using long handled sponge PRN  3. Pt will complete all aspects of LB dressing mod A with setup using AE PRN  4. Pt will complete all functional transfers to and from bed, chair, toilet, shower chair CGA/min cues  5. Pt will ambulate HH distance to bathroom for toileting CGA with RW  6.  Pt will complete all aspects of toileting task mod A  7. Pt will complete oral hygiene/grooming routine in standing at sink min A with setup/cues  8.  Pt will complete ther ex/ther act with focus on functional standing tolerance >5 minutes and cognitive re-orientation

## 2020-08-13 NOTE — PROGRESS NOTES
Physical Therapy    Physical Therapy Treatment Note  Name: Robin Gamboa MRN: [id number] :   1935   Date:  2020   Admission Date: 8/10/2020 Room:  [address] [place], [place] [address]   Restrictions/Precautions:        general precautions, falls, WBAT RLE  Communication with other providers:  Attempted to contact nurse before tx but phone was busy. Nurse came to room during tx session and also notified at end of tx that pt continues to be incontinent and also needing new sacral border patch . RUSH joined tx at 1330 for safety with gt and ADLs  Subjective:  Patient states:  Pt pleasantly confused but agreeable to tx  Pain:   Location, Type, Intensity (0/10 to 10/10):  Pt did exhibit some pain symptoms during gt but unable to rate and did not appear to be in distress. Objective:    Observation:  Alert but confused. Pt had dumped most of lunch tray in floor and bed and nurse notified. Treatment, including education/measures:  Sup to sit with mod assist and cues. Sit<=>stand from bed to walker mod assist. Pt appeared more painful with first to stand but then appear to tolerate better for remainder of tx session pt amb 4' x 1 mod assist and then 3' x 1 followed by chair but becoming incontinent of stool and had to sit down. Pt then amb with rw 3' x 2  chair <=> BSC with cga/min assist of 2. Pt stood 2-3 times for marisa care but each time she was incontinent of BM. Pt dependent for marisa care in standing and min assist for standing balance and safety at walker. Safety  Patient left safely in the chair, with call light/phone in reach with alarm applied. Gait belt was used for transfers and gait. Assessment / Impression:       Patient's tolerance of treatment:  good  Adverse Reaction: na  Significant change in status and impact:  na  Barriers to improvement:  incontinent of stool a confused  Plan for Next Session:    Cont.  POC  Time in:  1315  Time out:  1415  Timed treatment minutes:  60  Total treatment time: 61    Previously filed items:     Short term goals  Time Frame for Short term goals: 1 week  Short term goal 1: Pt will perform sit><supine Leatha  Short term goal 2: Pt will transfer sit><stand CGA  Short term goal 3: Pt will transfer between surfaces with RW CGA  Short term goal 4: Pt will ambulate 75ft with RW CGA  Short term goal 5: Pt will complete LE HEP x 10 reps SBA       Electronically signed by:    Edelmira Ko PTA  8/13/2020, 8:23 AM

## 2020-08-13 NOTE — PROGRESS NOTES
Nephrology Progress Note  8/13/2020 10:20 AM        Subjective:   Admit Date: 8/10/2020  PCP: Osbaldo Hendrickson PA-C    Interval History: has balbuena     Diet: ?     ROS:  No sob, increased ascites - has balbuena     Data:     Current med's:    magnesium oxide  400 mg Oral Daily    0.9 % sodium chloride  250 mL Intravenous Once    aspirin  81 mg Oral Daily    donepezil  10 mg Oral Nightly    lactulose  30 g Oral Daily    QUEtiapine  50 mg Oral Nightly    rifaximin  550 mg Oral BID    sodium chloride flush  10 mL Intravenous 2 times per day      dextrose           I/O last 3 completed shifts: In: 376.7 [Blood:376.7]  Out: 150 [Urine:150]    CBC:   Recent Labs     08/11/20 0441 08/12/20 0524 08/12/20 1958 08/13/20  0459   WBC 9.5 11.9*  --  13.5*   HGB 9.1* 6.8* 9.2* 9.1*   * 125*  --  132*          Recent Labs     08/11/20 0441 08/12/20 0410 08/13/20  0459    137 131*   K 4.2 4.8 4.8    106 99   CO2 23 21 24   BUN 23 32* 41*   CREATININE 1.0 1.5* 1.7*   GLUCOSE 85 89 81       Lab Results   Component Value Date    CALCIUM 8.1 (L) 08/13/2020    PHOS 4.0 08/11/2020       Objective:     Vitals: BP (!) 101/45   Pulse 89   Temp 96.3 °F (35.7 °C) (Axillary)   Resp 14   Wt 93 lb 9.6 oz (42.5 kg)   SpO2 98%   BMI 17.12 kg/m²     General appearance:  Thin, no apparent distress- she was pleasantly confused   HEENT:  ++ conj pallor  Neck:  supple  Lungs:  No crackles at least gross  Heart:  Seem RRR to me this am   Abdomen: soft  Extremities:  No overt LE edema   Has balbuena  Also Rt hip surgical changes       Problem List :         Impression :     1. OREN- CKD stage 3 - her UA nearly bland yesterday- haa balbuena and also increase ascites and wt gain - so likely need diuretics - her cr worsening   2. cirrhosis  3. Hip Fx S/p repair   4. Multifactorial anemia S/p transfusion  5.  Low na likely from ascites and access TBW    Recommendation/Plan  :     1. Lasix 20 IV x 1 first see how she handles 2. watch UOP  3. Daily wt   4. Po food/ fluid   5.  Follow clinically and biochemical       Yariel Goff MD

## 2020-08-13 NOTE — PROGRESS NOTES
POD 2  HB 9.1  WBAT   ASA AND TERRENCE HOSE FOR DVT PPX  D/C WHEN OK WITH PRIMARY  STAPLES OUT IN 10 TO 14 DAYS  FOLLOW UP IN OFFICE IN 6 TO 8 WEEKS

## 2020-08-14 NOTE — CARE COORDINATION
Pre-cert received for [facility]. They are able to take pt today if medically ready. Notified Dr Flakita Curiel via PS.   TE

## 2020-08-14 NOTE — DISCHARGE SUMMARY
Kin Hardin 1935 [id number]  PCP:  Fozia Villalobos PA-C    Admit date: 8/10/2020  Admitting Physician: Kennth Cheadle, MD    Discharge date: 8/14/2020 Discharge Physician: Kennth Cheadle, MD         Hospital Course and Discharge Diagnoses Include:    Right Femur Fx 2/2 Fall  - s/p Right Femur IM Nail Derrick Insertion 8/11  - imaging reviewed  - pain mx prn  - pt/ot  - ortho consulted, keep on low dose asa, no lovenox     Acute Anemia  - stable  - transfuse 1 unit PRBC  - check iron panel mild low  - b12 folate WNL  - no signs of gross blood loss     HyperK  - resolved  - given kayexalate, insulin and bicarb in ED, will give ca gluconate  - hold K supplement and aldactone     OREN on CKD  - started on lasix oral BID  - nephro following     Hypotension  - stable  - decreased  lopressor   - monitor closely      Hypothermic  - resolved  - monitor     COVID 19 negative     Placement approved     Dementia  HTN  HLD        Physical Exam on Discharge date: 08/14/20  Gen:  awake, alert, no apparent distress  Head/Eyes:  Normocephalic atraumatic, EOMI   NECK:   symmetrical, trachea midline  LUNGS: Normal Effort   CARDIOVASCULAR:  Normal rate  ABDOMEN:  non distended  MUSCULOSKELETAL:  ROM limited  NEUROLOGIC: confused Cranial nerves II-XII are grossly intact. SKIN:  no bruising or bleeding, normal skin color,  no redness    Procedures:  See above  Xr Pelvis (1-2 Views)    Result Date: 8/10/2020  EXAMINATION: ONE XRAY VIEW OF THE PELVIS 8/10/2020 6:15 am COMPARISON: None HISTORY: ORDERING SYSTEM PROVIDED HISTORY: trauma TECHNOLOGIST PROVIDED HISTORY: Reason for exam:->trauma Reason for Exam: trauma Acuity: Acute Type of Exam: Initial FINDINGS: There is an acute, closed, nondisplaced intertrochanteric right femur fracture. There is no dislocation. The fracture is traumatic. Acute closed nondisplaced right intertrochanteric femur fracture.      Xr Femur Right (min 2 Views)    Result Date: 8/10/2020  EXAMINATION: 4 X-RAY VIEWS OF THE RIGHT FEMUR 8/10/2020 9:48 am COMPARISON: Pelvic radiograph performed earlier in the same day. HISTORY: ORDERING SYSTEM PROVIDED HISTORY: Ortho PA asked for this to be ordered. TECHNOLOGIST PROVIDED HISTORY: Reason for exam:->Ortho PA asked for this to be ordered. Reason for Exam: Ortho PA asked for this to be ordered. FINDINGS: There is a closed impacted intertrochanteric slightly varus angulated fracture of the proximal right femur. The knee and hip joints are maintained. The bones are osteopenic. Impacted varus angulated closed intertrochanteric fracture of proximal right femur. Ct Head Wo Contrast    Result Date: 8/10/2020  EXAMINATION: CT OF THE HEAD WITHOUT CONTRAST 8/10/2020 6:46 am TECHNIQUE: CT of the head was performed without the administration of intravenous contrast. Dose modulation, iterative reconstruction, and/or weight based adjustment of the mA/kV was utilized to reduce the radiation dose to as low as reasonably achievable. COMPARISON: Head CT 07/24/2020 HISTORY: ORDERING SYSTEM PROVIDED HISTORY: fall TECHNOLOGIST PROVIDED HISTORY: Reason for exam:->fall Has a \"code stroke\" or \"stroke alert\" been called? ->No Reason for Exam: fall FINDINGS: BRAIN/VENTRICLES:  No masses nor acute intracranial hemorrhage. Intact gray/white matter differentiation without findings of acute ischemia. No mass effect nor midline shift. Patent basilar cisterns and foramen magnum. No hydrocephalus. Age-appropriate mild to moderate diffuse atrophy. Moderate to severe subcortical, deep, periventricular white matter hypodensities likely due to chronic small vessel ischemia. Old lacunar infarcts in the bilateral basal ganglia. ORBITS:  Bilateral lens implants. Visualized portions otherwise appear normal without acute abnormality. SINUSES:  Visualized portions appear normally pneumatized and aerated. SOFT TISSUES/SKULL:  No obvious acute soft tissue abnormality.   Moderate atherosclerotic calcifications. No acute fracture. No acute findings in the head. Ct Head Wo Contrast    Result Date: 7/24/2020  EXAMINATION: CT OF THE HEAD WITHOUT CONTRAST  7/24/2020 7:27 pm TECHNIQUE: CT of the head was performed without the administration of intravenous contrast. Dose modulation, iterative reconstruction, and/or weight based adjustment of the mA/kV was utilized to reduce the radiation dose to as low as reasonably achievable. COMPARISON: 06/05/2020 HISTORY: ORDERING SYSTEM PROVIDED HISTORY: falls, AMS TECHNOLOGIST PROVIDED HISTORY: Reason for exam:->falls, AMS Has a \"code stroke\" or \"stroke alert\" been called? ->No Reason for Exam: fall Acuity: Acute Type of Exam: Initial Mechanism of Injury: fall Relevant Medical/Surgical History: fall FINDINGS: Examination is degraded by motion. BRAIN/VENTRICLES: There is no acute intracranial hemorrhage, mass effect or midline shift. No abnormal extra-axial fluid collection. The gray-white differentiation is maintained without evidence of an acute infarct. There is no evidence of hydrocephalus. Periventricular and deep subcortical white matter hypoattenuation, consistent with microangiopathic change. Mild parenchymal volume loss. Atherosclerosis of the intracranial vasculature. Left sphenoid wing extra-axial lesion with associated calcification is unchanged in size and appearance in the interval, likely a meningioma. ORBITS: The visualized portion of the orbits demonstrate no acute abnormality. SINUSES: The visualized paranasal sinuses and mastoid air cells demonstrate no acute abnormality. SOFT TISSUES/SKULL:  No acute abnormality of the visualized skull or soft tissues. No acute intracranial abnormality. Microangiopathic change. Left sphenoid wing lesion is not significantly changed, likely a meningioma.      Ct Cervical Spine Wo Contrast    Result Date: 8/10/2020  EXAMINATION: CT OF THE CERVICAL SPINE WITHOUT CONTRAST 8/10/2020 6:45 am TECHNIQUE: CT of the cervical spine was performed without the administration of intravenous contrast. Multiplanar reformatted images are provided for review. Dose modulation, iterative reconstruction, and/or weight based adjustment of the mA/kV was utilized to reduce the radiation dose to as low as reasonably achievable. COMPARISON: Cervical spine CT 07/24/2020 HISTORY: ORDERING SYSTEM PROVIDED HISTORY: fall TECHNOLOGIST PROVIDED HISTORY: Reason for exam:->fall Reason for Exam: fall FINDINGS: BONES/ALIGNMENT:  Diffuse osseous demineralization. No acute fracture. Straightening of cervical lordosis. No spondylolisthesis. Unchanged grade 1 retrolisthesis of C4 on C5 likely due to underlying degenerative changes. DEGENERATIVE CHANGES:  Severe degenerative changes of the anterior atlantoaxial joint. Moderate to severe degenerative disc disease at C4/C5 and C5/C6 with minimal to mild involvement at other levels. Mild to moderate facet arthropathy, more severe on the left. SOFT TISSUES:  Normal appearance of the prevertebral soft tissues. Heterogeneous thyroid containing nodules measuring up to approximately 1.5 cm.     1. No acute findings in the cervical spine. 2. Mild to moderate cervical spine degenerative changes most severe at C4/C5 and C5/C6. 3. Bony demineralization. 4. Heterogeneous thyroid containing nodules measuring up to approximately 1.5 cm. Recommend further evaluation with sonography on a nonemergent basis as below. RECOMMENDATIONS: 1.5 cm incidental thyroid nodule. Recommend thyroid US if clinically warranted given patient age. Reference: J Am Christo Radiol. 2015 Feb;12(2): 143-50     Ct Cervical Spine Wo Contrast    Result Date: 7/24/2020  EXAMINATION: CT OF THE CERVICAL SPINE WITHOUT CONTRAST 7/24/2020 7:27 pm TECHNIQUE: CT of the cervical spine was performed without the administration of intravenous contrast. Multiplanar reformatted images are provided for review.  Dose modulation, iterative reconstruction, and/or weight based adjustment of the mA/kV was utilized to reduce the radiation dose to as low as reasonably achievable. COMPARISON: CT cervical spine 06/05/2020 HISTORY: Multiple falls, neck pain FINDINGS: BONES/ALIGNMENT: No acute fracture evident. Stable appearing grade 1 retrolisthesis C4 on C5. DEGENERATIVE CHANGES: Diffuse mild-to-moderate degenerative changes. Mild canal stenosis appears unchanged at C4-5 measuring 9 mm AP dimension. Mild bilateral neural foraminal narrowing predominates mid cervical spine. SOFT TISSUES: There is no prevertebral soft tissue swelling. Stable meningioma adjacent to the left sphenoid bone. Calcifications involving bilateral carotid vasculature reflect calcific atherosclerosis. No acute abnormality of the cervical spine. Stable grade 1 retrolisthesis C4 on C5. Stable degenerative changes predominate at C4-5 with mild cervical spinal canal stenosis at this level. Calcifications involving bilateral carotid vasculature reflect calcific atherosclerosis. Fl Less Than 1 Hour    Result Date: 8/11/2020  Radiology exam is complete. No Radiologist dictation. Please follow up with ordering provider. Xr Chest Portable    Result Date: 8/10/2020  EXAMINATION: ONE X-RAY VIEW OF THE CHEST 8/10/2020 6:15 am COMPARISON: Chest radiograph dated July 24, 2020 HISTORY: ORDERING SYSTEM PROVIDED HISTORY: Fall TECHNOLOGIST PROVIDED HISTORY: Reason for exam:-> Fall Reason for Exam: Fall Acuity: Acute Type of Exam: Initial FINDINGS: The cardiomediastinal silhouette is stable. A left-sided intracardiac device is seen. There is no focal consolidation, pleural effusion, or pneumothorax. There is no evidence of edema. No acute findings.      Xr Chest Portable    Result Date: 7/24/2020  EXAMINATION: ONE XRAY VIEW OF THE CHEST 7/24/2020 7:27 pm COMPARISON: 06/05/2020 HISTORY: ORDERING SYSTEM PROVIDED HISTORY: AMS TECHNOLOGIST PROVIDED HISTORY: Reason for exam:->AMS Reason for Exam: fall Acuity: Acute Type of Exam: Initial Mechanism of Injury: fall Relevant Medical/Surgical History: fall FINDINGS: Cardiac pacing device is unchanged in position. Cardiomediastinal silhouette is unchanged in size. Aortic atherosclerosis. Lungs are underinflated with linear atelectasis in the left lung base. No consolidation, pleural effusion, or pneumothorax. Osteopenia without acute osseous abnormality. No acute cardiopulmonary abnormality. Us Abdomen Limited Specify Organ? Liver    Result Date: 7/25/2020  EXAMINATION: ULTRASOUND ASCITES SURVEY 7/25/2020 8:15 am COMPARISON: 06/05/2020 HISTORY: ORDERING SYSTEM PROVIDED HISTORY: ascites  PROVIDED HISTORY: Reason for exam:->ascites survey Specify organ?->LIVER Reason for Exam: cirrhosis Type of Exam: Initial Relevant Medical/Surgical History: paracentesis 7- FINDINGS: Ascites is seen in all 4 quadrants. A moderate to large amount of ascites is present. Prominent pocket of ascites is demonstrated in the left lower quadrant. There is a moderate to large amount of ascites. Ir Us Guided Paracentesis    Result Date: 7/28/2020  PROCEDURE: ULTRASOUND GUIDED PARACENTESIS 7/27/2020 HISTORY: ORDERING SYSTEM PROVIDED HISTORY: Other ascites TECHNOLOGIST PROVIDED HISTORY: Reason for exam:->paracentesis send labs TECHNIQUE: Informed consent was obtained after a detailed explanation of the procedure including risks, benefits, and alternatives. Universal protocol was followed. The right abdomen was prepped and draped in sterile fashion and local anesthesia was achieved with lidocaine. An 8 Amharic needle sheath was advanced under ultrasound guidance into ascites and paracentesis was performed. Approximately 1625 mL of slightly cloudy yellow fluid was removed, 1 L sent for analysis. Afterwards a needle withdrawn and a sterile dressing applied. There was no bleeding or hematoma noted. The patient tolerated the procedure well.  FINDINGS: A total of 1625 mL of slightly cloudy yellow fluid removed of which 1 L was sent for analysis. Successful ultrasound guided paracentesis as above. Significant Diagnostic Studies at discharge:   CBC:   Lab Results   Component Value Date    WBC 12.5 08/14/2020    RBC 2.78 08/14/2020    HGB 8.2 08/14/2020    HCT 24.8 08/14/2020    MCV 89.2 08/14/2020    MCH 29.5 08/14/2020    MCHC 33.1 08/14/2020    RDW 18.3 08/14/2020     08/14/2020    MPV 11.7 08/14/2020       Patient Instructions:     Medication List      START taking these medications    aspirin 81 MG chewable tablet  Take 1 tablet by mouth daily  Start taking on:  August 15, 2020     furosemide 20 MG tablet  Commonly known as:  Lasix  Take 1 tablet by mouth 2 times daily     magnesium oxide 400 MG tablet  Commonly known as:  MAG-OX  Take 1 tablet by mouth daily  Start taking on:  August 15, 2020     oxyCODONE-acetaminophen 5-325 MG per tablet  Commonly known as:  PERCOCET  Take 1 tablet by mouth every 6 hours as needed for Pain for up to 3 days.         CHANGE how you take these medications    metoprolol tartrate 25 MG tablet  Commonly known as:  LOPRESSOR  Take 0.5 tablets by mouth 2 times daily  What changed:  how much to take     potassium chloride 20 MEQ extended release tablet  Commonly known as:  KLOR-CON M  Take 1 tablet by mouth 2 times daily  What changed:  how much to take        CONTINUE taking these medications    donepezil 10 MG tablet  Commonly known as:  ARICEPT  Take 1 tablet by mouth nightly     FISH OIL PO     lactulose 10 GM/15ML solution  Commonly known as:  CHRONULAC     MULTIVITAMIN ADULTS PO     QUEtiapine 50 MG tablet  Commonly known as:  SEROQUEL  Take 1 tablet by mouth nightly     rifaximin 550 MG tablet  Commonly known as:  XIFAXAN  Take 1 tablet by mouth 2 times daily     UNABLE TO FIND  Shower rail     UNABLE TO FIND  Shower chair/stool        STOP taking these medications    magnesium oxide 400 (240 Mg) MG tablet  Commonly known as:  MAG-OX     spironolactone 25 MG tablet  Commonly known as:  ALDACTONE     torsemide 20 MG tablet  Commonly known as:  DEMADEX           Where to Get Your Medications      You can get these medications from any pharmacy    Bring a paper prescription for each of these medications  · oxyCODONE-acetaminophen 5-325 MG per tablet     Information about where to get these medications is not yet available    Ask your nurse or doctor about these medications  · aspirin 81 MG chewable tablet  · furosemide 20 MG tablet  · magnesium oxide 400 MG tablet  · metoprolol tartrate 25 MG tablet  · potassium chloride 20 MEQ extended release tablet            Code Status: DNR-CCA     Consults:   IP CONSULT TO ORTHOPEDIC SURGERY  IP CONSULT TO HOSPITALIST    Diet: cardiac diet    Activity: activity as tolerated   Work:    Discharged Condition: fair    Prognosis: Fair    Disposition: SNF      Follow-up with   1. PCP within   5-7    Days    Follow up labs: as per [facility] [place]       Discharge Physician Signed: Hilton Kussmaul M.D. The patient was seen and examined on day of discharge and this discharge summary is in conjunction with any daily progress note from day of discharge.   Time spent on discharge in the examination, evaluation, counseling and review of medications and discharge plan: 34 minutes

## 2020-08-14 NOTE — PROGRESS NOTES
Physical Therapy    Physical Therapy Treatment Note  Name: Yris Chang MRN: [id number] :   1935   Date:  2020   Admission Date: 8/10/2020 Room:  [address] [place] [address]   Restrictions/Precautions:        general precautions, falls, WBAT RLE  Communication with other providers:  Per nurse ok to tx but that pt has been more agitated today. Nurse notified pt has catheter but pad in chair and gown wet at start of tx. Subjective:  Patient states:  Pt agreeable to tx and more talkative today but confused. Pain:   Location, Type, Intensity (0/10 to 10/10):  Pt did not appear to have pain with gt or transfers today. Objective:    Observation:  Confused but alert. Pt did not appear agitated during tx session but did c/o tired and wanting to return to bed after amb. Treatment, including education/measures:  Pt dependent for getting cleaned up and pad changed in chair. Pt was able to stand at walker with cga  and cues while pad changed in chair. Pt returned to sitting in chair. Sit<=>stand from chair with min assist and cues for hand placement. Pt only following cues for hand place about 50% of time. amb with rw 10' x 1 with min assist and followed with chair for safety. SPT chair to bed with mod assist and cues. Sit to sup mod assist and cues. Scooting to [facility] dependent. Safety  Patient left safely in the bed, with call light/phone in reach with alarm/LIVAN applied. Gait belt was used for transfers and gait. Assessment / Impression:       Patient's tolerance of treatment:  good  Adverse Reaction: na  Significant change in status and impact:  na  Barriers to improvement:  Confusion, strength, and safety  Plan for Next Session:    Cont.  POC  Time in:  1420  Time out:  1500  Timed treatment minutes:  40  Total treatment time:  40    Previously filed items:     Short term goals  Time Frame for Short term goals: 1 week  Short term goal 1: Pt will perform sit><supine Leatha  Short term goal 2: Pt will transfer sit><stand CGA  Short term goal 3: Pt will transfer between surfaces with RW CGA  Short term goal 4: Pt will ambulate 75ft with RW CGA  Short term goal 5: Pt will complete LE HEP x 10 reps SBA       Electronically signed by:    Steve Otto PTA  8/14/2020, 8:51 AM

## 2020-08-14 NOTE — PROGRESS NOTES
Nephrology Progress Note  8/14/2020 8:14 AM        Subjective:   Admit Date: 8/10/2020  PCP: Ranjit Delvalle PA-C    Interval History: sleeping does  Wake up    Diet: ?     ROS:  No sob , UOP ? Data:     Current meds:    magnesium oxide  400 mg Oral Daily    0.9 % sodium chloride  250 mL Intravenous Once    aspirin  81 mg Oral Daily    donepezil  10 mg Oral Nightly    lactulose  30 g Oral Daily    QUEtiapine  50 mg Oral Nightly    rifaximin  550 mg Oral BID    sodium chloride flush  10 mL Intravenous 2 times per day      dextrose           I/O last 3 completed shifts: In: 10 [I.V.:10]  Out: 200 [Urine:200]    CBC:   Recent Labs     08/12/20 0524 08/12/20 1958 08/13/20 0459 08/14/20  0522   WBC 11.9*  --  13.5* 12.5*   HGB 6.8* 9.2* 9.1* 8.2*   *  --  132* 135*          Recent Labs     08/12/20 0410 08/13/20 0459 08/14/20  0522    131* 132*   K 4.8 4.8 5.1    99 99   CO2 21 24 25   BUN 32* 41* 50*   CREATININE 1.5* 1.7* 1.7*   GLUCOSE 89 81 91       Lab Results   Component Value Date    CALCIUM 8.2 (L) 08/14/2020    PHOS 4.0 08/11/2020       Objective:     Vitals: /61   Pulse 66   Temp 97.7 °F (36.5 °C) (Rectal)   Resp 10   Wt 93 lb 9.6 oz (42.5 kg)   SpO2 98%   BMI 17.12 kg/m²     General appearance:  Thin. cachectic  HEENT:  + pallor  Neck:  supple  Lungs:  No crackles  Heart:  Seems RRR  Abdomen: soft, ascites  Extremities:  No edema   Has balbuena       Problem List :         Impression :     1. OREN- CKD stage 3 - ? UOP_ seems good urine in balbuena likely from ascites - less likely HRS   2. cirrhosis / hip Fx S/p repair  3. Anemia /   4. Low na likely from Aceite     Recommendation/Plan  :     1. She got 1 dose   Of  loop   2. Keep  Lasix  20 BID for now   3. Watch UOP  4. Daily wt  5. labs in am   6.  Po food/ fluid       Yariel Goff MD

## 2020-08-14 NOTE — PROGRESS NOTES
Occupational Therapy  . Occupational Therapy Treatment Note  Name: Delbert Oates MRN: [id number] :   1935   Date:  2020   Admission Date: 8/10/2020 Room:  [address] [place] [address]   Restrictions/Precautions:    General precautions; Fall Risk, RLE WBAT. Communication with other providers:  Per chart review and Nurse Luna, patient is appropriate for therapeutic intervention. Hx of Dementia. Subjective:  Patient states:  Pt agreeable to OT Tx session. Pain:   Location, Type, Intensity (0/10 to 10/10):  0/10, denies pain at rest    Objective:    Observation:  Pt received in supine, dozing, required verbal / tactile cues for decreased arousal, confusion, required cues for sequencing / participation  Incontinent of BM on chux pad. Objective Measures:  Telemetry, HR 68 to 77, RR 9 (sleeping) to 16 (c transfers)  Butler catheter LUE saline lock. Treatment, including education:  Therapeutic Activity Training:   Therapeutic activity training was instructed today. Cues were given for safety, sequence, UE/LE placement, awareness, and balance. Activities performed today included bed mobility training, sup-sit, sit-stand, SPT. Supine to sit: Mod A + verbal / physical cues + increased time to follow cues  Scooting: Mod A  Sit to stands: Varied between CGA for safety (impulsively attempting stands) to Mod A c RW for pt's intermittent resistance, identifying RLE pain. With increased time, pt progressing to CGA/Min A for subsequent trials w/o AD   Stand to sits: CGA/Min A c RW, and w/o AD  Stand Step Transfers: See Toilet Transfers for details. Pt performed w/o AD s/p use of RW unsuccessful. Self Care Training:   Cues were given for safety, sequence, UE/LE placement, visual cues, and balance. Activities performed today included  toileting, hand hygiene, and grooming. Grooming: Sup head + cues for initiation to wash face and hands  Toilet Transfer:  Mod A / Min A w/o AD for stand step transfer to EOB to BSC to bedside chair. Pt required sequential cues, verbal and physical as well as hand held assist. Pt incontinent of BM onto chux pad and floor, completed BM in toilet. Required re-direction for task, exhibited mild agitation and desire to get to chair. Toileting: Dep for hygiene s/p BM in toilet and for clothing mgmt    All therapeutic intervention performed c emphasis on cognitive re-orientation, dynamic balance / standing tolerance to inc strength, endurance and act tolerance for inc Indep c ADL tasks, func transfers / mobility. Safety  Patient safely in bedside chair + alarm in place at end of session, with call light/phone in reach, and nursing aware. Gait belt was used for func transfers / mobility. Nurse Joselin Roman arrived for med pass at end of Tx session. Assessment / Impression:        Patient's tolerance of treatment:  Fair   Adverse Reaction: None  Significant change in status and impact:  Decreased arousal this Tx session, mild agitation (Hx of Dementia)  Barriers to improvement:  Decreased cognition, pain c transfers, decreased balance    Plan for Next Session:    Continue per OT POC c plan to continue addressing functional transfers during ADLs. Time in:  1007  Time out:  1048  Timed treatment minutes:  41  Total treatment time:  41    Electronically signed by:    LISA Cardozo  8/14/2020, 10:11 AM    Previously filed values:    Goals:  1. Pt will complete all aspects of bed mobility for EOB/OOB ADLs min A with cues  2. Pt will complete UB/LB bathing mod A with setup using long handled sponge PRN  3. Pt will complete all aspects of LB dressing mod A with setup using AE PRN  4. Pt will complete all functional transfers to and from bed, chair, toilet, shower chair CGA/min cues  5. Pt will ambulate HH distance to bathroom for toileting CGA with RW  6. Pt will complete all aspects of toileting task mod A  7.  Pt will complete oral hygiene/grooming routine in standing at sink min A with setup/cues  8.  Pt will complete ther ex/ther act with focus on functional standing tolerance >5 minutes and cognitive re-orientation

## 2020-08-14 NOTE — CARE COORDINATION
Updated notes faxed to [facility] per her request d/t they cannot access Epic at this time. She will notify the charge nurse if the pre-cert is received after CM hrs. Notified Charge nurse.

## 2020-08-14 NOTE — PROGRESS NOTES
Hospitalist Progress Note      Name:  Leeanne Hewitt /Age/Sex: 1935  (80 y.o. female)   MRN & CSN:  [id number] & [id number] Admission Date/Time: 8/10/2020  4:49 AM   Location:  [address] [place] [address] PCP: Noemy Madison PA-C       Leeanne Hewitt is a 80 y.o.  female  who presents with Fall      Assessment and Plan:   Right Femur Fx  Fall  - s/p Right Femur IM Nail Derrick Insertion   - imaging reviewed  - pain mx prn  - pt/ot  - ortho consulted, keep on low dose asa, no lovenox     Acute Anemia  - stable  - transfuse 1 unit PRBC  - check iron panel mild low  - b12 folate WNL  - no signs of gross blood loss     HyperK  - resolved  - given kayexalate, insulin and bicarb in ED, will give ca gluconate  - hold K supplement and aldactone     OREN on CKD  - started on lasix   - nephro following     Hypotension  - stable  - d/c lopressor and torsemide for now  - monitor closely      Hypothermic  - resolved  - monitor     COVID 19 negative     Placement pending     Dementia  HTN  HLD            Diet DIET GENERAL;   Code Status DNR-CCA     Medications:   Medications:    furosemide  20 mg Intravenous BID    magnesium oxide  400 mg Oral Daily    0.9 % sodium chloride  250 mL Intravenous Once    aspirin  81 mg Oral Daily    donepezil  10 mg Oral Nightly    lactulose  30 g Oral Daily    QUEtiapine  50 mg Oral Nightly    rifaximin  550 mg Oral BID    sodium chloride flush  10 mL Intravenous 2 times per day      Infusions:    dextrose       PRN Meds: oxyCODONE-acetaminophen, 1 tablet, Q6H PRN  glucose, 15 g, PRN  dextrose, 12.5 g, PRN  glucagon (rDNA), 1 mg, PRN  dextrose, 100 mL/hr, PRN  sodium chloride flush, 10 mL, PRN  acetaminophen, 650 mg, Q6H PRN    Or  acetaminophen, 650 mg, Q6H PRN  polyethylene glycol, 17 g, Daily PRN  promethazine, 12.5 mg, Q6H PRN    Or  ondansetron, 4 mg, Q6H PRN  morphine, 2 mg, Q2H PRN    Or  morphine, 4 mg, Q2H PRN      Subjective:   Seems fatigued this am, no distress    Objective: Intake/Output Summary (Last 24 hours) at 8/14/2020 1050  Last data filed at 8/13/2020 2313  Gross per 24 hour   Intake 10 ml   Output --   Net 10 ml      Vitals:   Vitals:    08/14/20 1045   BP: (!) 130/45   Pulse: 72   Resp: 11   Temp: 97.8 °F (36.6 °C)   SpO2: 97%     Physical Exam:   Gen:  awake,no apparent distress  Head/Eyes:  Normocephalic atraumatic, EOMI   NECK:   symmetrical, trachea midline  LUNGS: Normal Effort   CARDIOVASCULAR:  Normal rate  ABDOMEN:  non distended  MUSCULOSKELETAL:  ROM limited  NEUROLOGIC: confuse  SKIN:  no bruising or bleeding, normal skin color,  no redness      Data:       CBC   Recent Labs     08/12/20  0524 08/12/20 1958 08/13/20 0459 08/14/20  0522   WBC 11.9*  --  13.5* 12.5*   HGB 6.8* 9.2* 9.1* 8.2*   HCT 20.9* 31.2* 27.5* 24.8*   *  --  132* 135*      BMP   Recent Labs     08/12/20  0410 08/13/20 0459 08/14/20  0522    131* 132*   K 4.8 4.8 5.1    99 99   CO2 21 24 25   BUN 32* 41* 50*   CREATININE 1.5* 1.7* 1.7*         Electronically signed by Chad Leigh MD on 8/14/2020 at 10:50 AM

## 2020-09-14 NOTE — PROGRESS NOTES
Per caregiver at [facility] pt had covid test done there on 9/8/2020\"still waiting on test results\"

## 2020-09-14 NOTE — PROGRESS NOTES
Notified IR depjair Adam) patient was dx with DVT involving right mid femoral vein 9/7/2020- per [facility] and on ELiquis for 7 days- started first dose 9/8/2020 to finish 9/15/2020- per Dr Zari Cantu / lilliam Farris does not have to hold the eliquis for the paracentesis

## 2020-09-16 NOTE — PROGRESS NOTES
Procedure cancelled due to abnormal labs, pt and spouse informed. Informed that Eliquis needs to be held for 24 hours prior to procedure, spouse became very upset/stating no one knows what they are doing. Informed spouse I will call [facility] home and give instructions for next procedure. 1340 pt discharged to [facility].

## 2020-09-28 ENCOUNTER — CARE COORDINATION (OUTPATIENT)
Dept: CASE MANAGEMENT | Age: 85
End: 2020-09-28

## 2023-07-17 NOTE — PROGRESS NOTES
SUBJECTIVE:    HPI:   Cardiac pacemaker  In situ working well    Concussion wth loss of consciousness of 30 minutes or less  Full recovery, alert no new memory problems    Contusion of face  Healing well nearly resolved     HTN (hypertension)  /80 (Site: Right Arm, Position: Sitting, Cuff Size: Medium Adult)   Pulse 97   Resp 16   Wt 116 lb 6.4 oz (52.8 kg)   SpO2 94%   BMI 21.29 kg/m²    Doing well no orthostasis    Head injury  Resolved post injury, residual pain in shoulder and back of neck    Recurrent major depressive disorder, in partial remission (HCC)  Good days and bad, low dose prozac helping      CHIEF COMPLAINT:    Chief Complaint   Patient presents with    1 Month Follow-Up       REVIEW OF SYSTEMS:    Review of Systems   Constitutional: Negative for activity change, fatigue and fever. HENT: Negative for congestion, ear pain, rhinorrhea, sinus pressure and sore throat. Fall with facial contusions, healing feels better   Eyes: Negative for visual disturbance. Respiratory: Negative for cough, chest tightness, shortness of breath and wheezing. Cardiovascular: Negative for chest pain, palpitations and leg swelling. Gastrointestinal: Negative for abdominal pain, blood in stool, constipation, diarrhea, nausea and vomiting. Genitourinary: Negative for dysuria, flank pain, hematuria and urgency. Musculoskeletal: Negative for arthralgias, back pain and joint swelling. Skin: Negative for rash. Neurological: Negative for weakness, numbness and headaches. Psychiatric/Behavioral: Negative. OBJECTIVE  PHYSICAL EXAM:    /80 (Site: Right Arm, Position: Sitting, Cuff Size: Medium Adult)   Pulse 97   Resp 16   Wt 116 lb 6.4 oz (52.8 kg)   SpO2 94%   BMI 21.29 kg/m²       Physical Exam   Constitutional: She is oriented to person, place, and time. She appears well-developed and well-nourished. HENT:   Head: Normocephalic and atraumatic.    Right Ear: External ear normal.   Left Ear: External ear normal.   Nose: Nose normal.   Mouth/Throat: Oropharynx is clear and moist.   Eyes: Conjunctivae and EOM are normal. Pupils are equal, round, and reactive to light. Neck: Normal range of motion. Neck supple. No JVD present. No tracheal deviation present. No thyromegaly present. Cardiovascular: Normal rate, regular rhythm, normal heart sounds and intact distal pulses. Pulmonary/Chest: Effort normal and breath sounds normal. She has no wheezes. She has no rales. Abdominal: Soft. Bowel sounds are normal. She exhibits no mass. Musculoskeletal: Normal range of motion. She exhibits no edema. Lymphadenopathy:     She has no cervical adenopathy. Neurological: She is alert and oriented to person, place, and time. She has normal reflexes. Skin: Skin is warm and dry. No rash noted. nearly resolved echymosis   Psychiatric: She has a normal mood and affect. Her behavior is normal. Judgment and thought content normal.       ASSESSMENT:    1. Concussion with loss of consciousness of 30 minutes or less, sequela ([address]    2. Injury of head, sequela    3. Contusion of face, sequela    4. Essential hypertension    5. Cardiac pacemaker    6. Pulmonary hypertension    7. Recurrent major depressive disorder, in partial remission ([address]        PLAN:    Rico Hull was seen today for 1 month follow-up.     Diagnoses and all orders for this visit:    Concussion with loss of consciousness of 30 minutes or less, sequela ([address]    Injury of head, sequela    Contusion of face, sequela    Essential hypertension    Cardiac pacemaker    Pulmonary hypertension    Recurrent major depressive disorder, in partial remission ([address]       Cardiac pacemaker  In situ working well    Concussion wth loss of consciousness of 30 minutes or less  Full recovery, alert no new memory problems    Contusion of face  Healing well nearly resolved     HTN (hypertension)  /80 (Site: Right Arm, Position: Sitting, Cuff Size: Medium Adult)   Pulse 97   Resp 16   Wt 116 lb 6.4 oz (52.8 kg)   SpO2 94%   BMI 21.29 kg/m²    Doing well no orthostasis    Head injury  Resolved post injury, residual pain in shoulder and back of neck    Recurrent major depressive disorder, in partial remission (HCC)  Good days and bad, low dose prozac helping Topical Ketoconazole Counseling: Patient counseled that this medication may cause skin irritation or allergic reactions.  In the event of skin irritation, the patient was advised to reduce the amount of the drug applied or use it less frequently.   The patient verbalized understanding of the proper use and possible adverse effects of ketoconazole.  All of the patient's questions and concerns were addressed.

## (undated) DEVICE — APPLICATOR MEDICATED 26 CC SOLUTION HI LT ORNG CHLORAPREP

## (undated) DEVICE — TUBING, SUCTION, 9/32" X 10', STRAIGHT: Brand: MEDLINE

## (undated) DEVICE — Device

## (undated) DEVICE — GAUZE,SPONGE,4"X4",16PLY,XRAY,STRL,LF: Brand: MEDLINE

## (undated) DEVICE — SOLUTION IV IRRIG WATER 1000ML POUR BRL 2F7114

## (undated) DEVICE — BIT DRL L145MM DIA4.2MM NONSTERILE 3 FLUT NDL PNT QUIK CPL

## (undated) DEVICE — SYRINGE IRRIG 60ML SFT PLIABLE BLB EZ TO GRP 1 HND USE W/

## (undated) DEVICE — GLOVE SURG SZ 8 CRM LTX FREE POLYISOPRENE POLYMER BEAD ANTI

## (undated) DEVICE — ROD RMR L950MM DIA3MM W/ STR BALL TIP

## (undated) DEVICE — SPONGE LAP W18XL18IN WHT COT 4 PLY FLD STRUNG RADPQ DISP ST

## (undated) DEVICE — BANDAGE COMPR W4INXL5YD WHT BGE POLY COT M E WRP WV HK AND

## (undated) DEVICE — COVER,C-ARM,41X74: Brand: MEDLINE

## (undated) DEVICE — SUTURE VCRL SZ 0 L18IN ABSRB UD L36MM CT-1 1/2 CIR J840D

## (undated) DEVICE — TRAY EPI 25GA L3.5IN CONTAIN BPA DEHP PVC PENCAN

## (undated) DEVICE — ELECTRODE ES AD CRDLSS PT RET REM POLYHESIVE

## (undated) DEVICE — GUIDEWIRE ORTH L400MM DIA3.2MM FOR TFN

## (undated) DEVICE — COUNTER NDL 30 COUNT FOAM STRP SGL MAG

## (undated) DEVICE — ROD RMR L950MM DIA2.5MM W/ EXTN BALL TIP

## (undated) DEVICE — TOWEL,OR,DSP,ST,BLUE,STD,6/PK,12PK/CS: Brand: MEDLINE

## (undated) DEVICE — DRAPE SHEET ULTRAGARD: Brand: MEDLINE

## (undated) DEVICE — 6617 IOBAN II PATIENT ISOLATION DRAPE 5/BX,4BX/CS: Brand: STERI-DRAPE™ IOBAN™ 2

## (undated) DEVICE — MAT FLOOR ULTRA ABS 28X48IN

## (undated) DEVICE — DRESSING,GAUZE,XEROFORM,CURAD,1"X8",ST: Brand: CURAD

## (undated) DEVICE — YANKAUER,FLEXIBLE HANDLE,REGLR CAPACITY: Brand: MEDLINE INDUSTRIES, INC.

## (undated) DEVICE — MARKER SURG SKIN UTIL REGULAR/FINE 2 TIP W/ RUL AND 9 LBL

## (undated) DEVICE — INTENDED FOR TISSUE SEPARATION, AND OTHER PROCEDURES THAT REQUIRE A SHARP SURGICAL BLADE TO PUNCTURE OR CUT.: Brand: BARD-PARKER ® STAINLESS STEEL BLADES

## (undated) DEVICE — PENCIL ES CRD L10FT HND SWCHING ROCK SWCH W/ EDGE COAT BLDE

## (undated) DEVICE — PAD,ABDOMINAL,5"X9",ST,LF,25/BX: Brand: MEDLINE INDUSTRIES, INC.

## (undated) DEVICE — BANDAGE,SELF ADHRNT,COFLEX,4"X5YD,STRL: Brand: COLABEL

## (undated) DEVICE — SPONGE GZ W4XL8IN COT WVN 12 PLY

## (undated) DEVICE — SOLUTION IV IRRIG POUR BRL 0.9% SODIUM CHL 2F7124